# Patient Record
Sex: MALE | Race: WHITE | NOT HISPANIC OR LATINO | Employment: OTHER | URBAN - METROPOLITAN AREA
[De-identification: names, ages, dates, MRNs, and addresses within clinical notes are randomized per-mention and may not be internally consistent; named-entity substitution may affect disease eponyms.]

---

## 2017-01-13 ENCOUNTER — OFFICE VISIT (OUTPATIENT)
Dept: URGENT CARE | Facility: URGENT CARE | Age: 52
End: 2017-01-13
Payer: COMMERCIAL

## 2017-01-13 ENCOUNTER — TELEPHONE (OUTPATIENT)
Dept: NURSING | Facility: CLINIC | Age: 52
End: 2017-01-13

## 2017-01-13 VITALS
DIASTOLIC BLOOD PRESSURE: 66 MMHG | BODY MASS INDEX: 34.84 KG/M2 | OXYGEN SATURATION: 95 % | HEIGHT: 70 IN | TEMPERATURE: 98.3 F | WEIGHT: 243.4 LBS | HEART RATE: 86 BPM | SYSTOLIC BLOOD PRESSURE: 104 MMHG

## 2017-01-13 DIAGNOSIS — J45.901 ASTHMA EXACERBATION: Primary | ICD-10-CM

## 2017-01-13 DIAGNOSIS — J98.01 ACUTE BRONCHOSPASM: ICD-10-CM

## 2017-01-13 DIAGNOSIS — J30.9 ALLERGIC RHINITIS, MILD: ICD-10-CM

## 2017-01-13 PROCEDURE — 94640 AIRWAY INHALATION TREATMENT: CPT | Performed by: PHYSICIAN ASSISTANT

## 2017-01-13 PROCEDURE — 99214 OFFICE O/P EST MOD 30 MIN: CPT | Mod: 25 | Performed by: PHYSICIAN ASSISTANT

## 2017-01-13 RX ORDER — ALBUTEROL SULFATE 0.83 MG/ML
1 SOLUTION RESPIRATORY (INHALATION) ONCE
Qty: 3 ML | Refills: 0
Start: 2017-01-13 | End: 2017-01-13

## 2017-01-13 RX ORDER — PREDNISONE 20 MG/1
40 TABLET ORAL DAILY
Qty: 8 TABLET | Refills: 0 | Status: SHIPPED | OUTPATIENT
Start: 2017-01-13 | End: 2017-01-18

## 2017-01-13 RX ORDER — LORATADINE 10 MG/1
10 TABLET ORAL DAILY
Qty: 30 TABLET | Refills: 0
Start: 2017-01-13 | End: 2018-11-27

## 2017-01-13 RX ORDER — ALBUTEROL SULFATE 0.83 MG/ML
1 SOLUTION RESPIRATORY (INHALATION) EVERY 4 HOURS PRN
Qty: 1 BOX | Refills: 0 | Status: SHIPPED | OUTPATIENT
Start: 2017-01-13 | End: 2017-05-12

## 2017-01-13 RX ORDER — PREDNISONE 20 MG/1
40 TABLET ORAL ONCE
Qty: 2 TABLET | Refills: 0
Start: 2017-01-13 | End: 2017-01-13

## 2017-01-13 NOTE — NURSING NOTE
"Chief Complaint   Patient presents with     Asthma     can not  take a deap breath will cough and lungs start to hurt sense 6 am        Initial /66 mmHg  Pulse 86  Temp(Src) 98.3  F (36.8  C) (Oral)  Ht 5' 10\" (1.778 m)  Wt 243 lb 6.4 oz (110.406 kg)  BMI 34.92 kg/m2  SpO2 95% Estimated body mass index is 34.92 kg/(m^2) as calculated from the following:    Height as of this encounter: 5' 10\" (1.778 m).    Weight as of this encounter: 243 lb 6.4 oz (110.406 kg).  BP completed using cuff size: large    "

## 2017-01-13 NOTE — PROGRESS NOTES
"SUBJECTIVE:   Romelia Crystla is a 51 year old male presenting with a chief complaint of wheezing and asthma exacerbation.  Onset of symptoms was this morning .  Course of illness is same.    Severity moderately severe  Current and Associated symptoms: coughing and chest tightness  Treatment measures tried include albuterol.  Predisposing factors include asthma and allergies.    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     Followed by Rivera Dinero at [facility]     Allergic rhinitis, mild      with cough     Mild persistent asthma      Eczema      HTN (hypertension), benign      Mild persistent asthma         Allergies   Allergen Reactions     Hmg-Coa-R Inhibitors Other (See Comments)     Muscle aches      Lisinopril      cough         Social History   Substance Use Topics     Smoking status: Never Smoker      Smokeless tobacco: Never Used     Alcohol Use: 0.0 oz/week     0 Standard drinks or equivalent per week      Comment: 6 pack a week       ROS:  CONSTITUTIONAL:NEGATIVE for fever, chills, change in weight  INTEGUMENTARY/SKIN: NEGATIVE for worrisome rashes, moles or lesions  ENT/MOUTH: POSITIVE for runny nose, nasal congestion  RESP:POSITIVE for cough-non productive, Hx asthma and wheezing  CV: NEGATIVE for chest pain, palpitations or peripheral edema  GI: NEGATIVE for nausea, abdominal pain, heartburn, or change in bowel habits  MUSCULOSKELETAL: NEGATIVE for significant arthralgias or myalgia  NEURO: NEGATIVE for weakness, dizziness or paresthesias    OBJECTIVE  :/66 mmHg  Pulse 86  Temp(Src) 98.3  F (36.8  C) (Oral)  Ht 5' 10\" (1.778 m)  Wt 243 lb 6.4 oz (110.406 kg)  BMI 34.92 kg/m2  SpO2 95%  GENERAL APPEARANCE: healthy, alert and no distress  HENT: ear canals and TM's normal.  Nose and mouth without ulcers, erythema or lesions  NECK: supple, nontender, no lymphadenopathy  RESP: expiratory wheezes generalized  CV: regular " rates and rhythm, normal S1 S2, no murmur noted  NEURO: Normal strength and tone, sensory exam grossly normal,  normal speech and mentation  SKIN: no suspicious lesions or rashes    Albuterol neb treatment given in clinic    ASSESSMENT/PLAN:    ICD-10-CM    1. Asthma exacerbation J45.901 albuterol (2.5 MG/3ML) 0.083% neb solution     INHALATION/NEBULIZER TREATMENT, INITIAL     predniSONE (DELTASONE) 20 MG tablet     predniSONE (DELTASONE) 20 MG tablet     order for DME     albuterol (2.5 MG/3ML) 0.083% neb solution   2. Allergic rhinitis, mild J30.9 loratadine (CLARITIN) 10 MG tablet   3. Acute bronchospasm J98.01 albuterol (2.5 MG/3ML) 0.083% neb solution     INHALATION/NEBULIZER TREATMENT, INITIAL     predniSONE (DELTASONE) 20 MG tablet     predniSONE (DELTASONE) 20 MG tablet     order for DME     albuterol (2.5 MG/3ML) 0.083% neb solution       Continue albuterol neb at home  Follow up with PCP as needed

## 2017-01-13 NOTE — TELEPHONE ENCOUNTER
Call Type: Triage Call    Presenting Problem: Pharmacy calling w/ question about RX just sent  to them by PA at [facility]. Question can't be answered by note.  Warm  transferred call to [facility].  Triage Note:  Guideline Title: Medication Questions - Adult  Recommended Disposition: Call Provider Immediately  Original Inclination: Wanted to speak with a nurse  Override Disposition:  Intended Action: Call PCP/HCP  Physician Contacted: No  Pharmacy calling to clarify prescription order. ?  YES  Sign(s) or symptom(s) associated with a diagnosed condition or with a new illness  ? NO  Physician Instructions:  Care Advice: Call provider for clarification of the prescribed medication  order.

## 2017-01-14 ENCOUNTER — HOSPITAL ENCOUNTER (EMERGENCY)
Facility: CLINIC | Age: 52
Discharge: HOME | End: 2017-01-15
Attending: EMERGENCY MEDICINE | Admitting: EMERGENCY MEDICINE
Payer: COMMERCIAL

## 2017-01-14 ENCOUNTER — APPOINTMENT (OUTPATIENT)
Dept: GENERAL RADIOLOGY | Facility: CLINIC | Age: 52
End: 2017-01-14
Attending: EMERGENCY MEDICINE
Payer: COMMERCIAL

## 2017-01-14 DIAGNOSIS — J45.901 ASTHMA EXACERBATION: ICD-10-CM

## 2017-01-14 DIAGNOSIS — J10.1 INFLUENZA A: ICD-10-CM

## 2017-01-14 LAB
FLUAV+FLUBV AG SPEC QL: ABNORMAL
FLUAV+FLUBV AG SPEC QL: POSITIVE
SPECIMEN SOURCE: ABNORMAL

## 2017-01-14 PROCEDURE — 71020 XR CHEST 2 VW: CPT

## 2017-01-14 PROCEDURE — 94640 AIRWAY INHALATION TREATMENT: CPT | Mod: 76

## 2017-01-14 PROCEDURE — 87804 INFLUENZA ASSAY W/OPTIC: CPT | Performed by: EMERGENCY MEDICINE

## 2017-01-14 PROCEDURE — 25000132 ZZH RX MED GY IP 250 OP 250 PS 637: Performed by: EMERGENCY MEDICINE

## 2017-01-14 PROCEDURE — 25000125 ZZHC RX 250

## 2017-01-14 PROCEDURE — 99284 EMERGENCY DEPT VISIT MOD MDM: CPT | Mod: 25

## 2017-01-14 PROCEDURE — 25000125 ZZHC RX 250: Performed by: EMERGENCY MEDICINE

## 2017-01-14 PROCEDURE — 94640 AIRWAY INHALATION TREATMENT: CPT

## 2017-01-14 RX ORDER — IPRATROPIUM BROMIDE AND ALBUTEROL SULFATE 2.5; .5 MG/3ML; MG/3ML
SOLUTION RESPIRATORY (INHALATION)
Status: COMPLETED
Start: 2017-01-14 | End: 2017-01-14

## 2017-01-14 RX ORDER — IPRATROPIUM BROMIDE AND ALBUTEROL SULFATE 2.5; .5 MG/3ML; MG/3ML
3 SOLUTION RESPIRATORY (INHALATION)
Status: DISCONTINUED | OUTPATIENT
Start: 2017-01-14 | End: 2017-01-15 | Stop reason: HOSPADM

## 2017-01-14 RX ORDER — ALBUTEROL SULFATE 0.83 MG/ML
SOLUTION RESPIRATORY (INHALATION)
Status: DISCONTINUED
Start: 2017-01-14 | End: 2017-01-15 | Stop reason: HOSPADM

## 2017-01-14 RX ORDER — OSELTAMIVIR PHOSPHATE 75 MG/1
75 CAPSULE ORAL ONCE
Status: COMPLETED | OUTPATIENT
Start: 2017-01-14 | End: 2017-01-14

## 2017-01-14 RX ADMIN — IPRATROPIUM BROMIDE AND ALBUTEROL SULFATE 3 ML: .5; 3 SOLUTION RESPIRATORY (INHALATION) at 23:37

## 2017-01-14 RX ADMIN — IPRATROPIUM BROMIDE AND ALBUTEROL SULFATE 3 ML: .5; 3 SOLUTION RESPIRATORY (INHALATION) at 22:35

## 2017-01-14 RX ADMIN — OSELTAMIVIR PHOSPHATE 75 MG: 75 CAPSULE ORAL at 23:53

## 2017-01-14 NOTE — LETTER
EMERGENCY DEPARTMENT  [address] [address]  [phone]    January 15, 2017    Romelia Crystal  [address] [place] [address]  [phone] (home) [phone] (work)    : 1965      To Whom it may concern:    Romelia Crystal was seen in our Emergency Department today, -15, 2017.  I expect his condition to improve over the next 3 days.  He may return to work when improved.    Sincerely,        Donn Keating

## 2017-01-14 NOTE — ED AVS SNAPSHOT
Emergency Department    [address] [address]    Phone:  [phone]    Fax:  [phone]                                       Romelia Crystal   MRN: [id number]    Department:   Emergency Department   Date of Visit:  1/14/2017           Patient Information     Date Of Birth          1965        Your diagnoses for this visit were:     Influenza A     Asthma exacerbation        You were seen by Donn Keating MD.      Follow-up Information     Follow up with Sandra Guardado PA-C. Schedule an appointment as soon as possible for a visit in 2 days.    Specialty:  Internal Medicine    Why:  As needed    Contact information:    [facility]  [address]  [place] [id number] [phone]          Follow up with  Emergency Department.    Specialty:  EMERGENCY MEDICINE    Why:  As needed, If symptoms worsen    Contact information:    [address] [id number] [phone]        Discharge Instructions       Discharge Instructions  Influenza    You were diagnosed today with influenza or influenza like illness.  Influenza is a respiratory illness caused by influenza A or B viruses.  Influenza causes fever, headache, muscle aches, and fatigue.  These symptoms start one to four days after you have been around a person with this illness.  People with influenza commonly have a dry cough, sore throat, and a runny nose.   Influenza is spread through sneezing and coughing and can live on surfaces for several days.  It is usually contagious for 5 days but in some cases up to 10 days and often affects several family members. If you have a family member who is less than 2 years old, older than 65 years old, pregnant or has a serious medical condition, they should be seen right away by a physician to decide if they should take preventative medications.      Return to the Emergency Department if:    You have trouble breathing.    You develop  pain in your chest.    You have signs of being dehydrated, such as dizziness or unable to urinate at least three times daily.    You feel confused.    You cannot stop vomiting or you cannot drink enough fluids.    In children, you should seek help if the child has any of the above or if child:    Has blue or purplish skin color.    Is so irritable that he or she does not want to be held.    Does not have tears when crying (in infants) or does not urinate at least three times daily.    Does not wake up easily.    Follow-up with your doctor if you are not improving after 5 days.    What can I do to help myself?    Rest.    Fluids -- Drink hydrating solutions such as Gatorade  or Pedialyte  as often as you can. If you are drinking enough, you should pass urine at least every eight hours.    Tylenol  (acetaminophen) and Advil  (ibuprofen) can relieve fever, headache, and muscle aches. Do not give aspirin to children under 18 years old.     Antiviral treatment -- Antiviral medicines do not make the flu symptoms go away immediately.  They have only been shown to make the symptoms go away 12 to 24 hours sooner than they would without treatment.       Antibiotics -- Antibiotics are NOT useful for treating viral illnesses such as influenza. Antibiotics should only be used if there is a bacterial complication of the flu such as bacterial pneumonia, ear infection, or sinusitis.    Because you were diagnosed with a flu like illness you are very contagious.  This means you cannot work, attend school or  for at least 24 hours or until you no longer have a fever.  If you were given a prescription for medicine here today, be sure to read all of the information (including the package insert) that comes with your prescription.  This will include important information about the medicine, its side effects, and any warnings that you need to know about.  The pharmacist who fills the prescription can provide more information and  answer questions you may have about the medicine.  If you have questions or concerns that the pharmacist cannot address, please call or return to the Emergency Department.         Discharge Instructions  Asthma    Asthma is a condition causing narrowing and inflammation of the airways that can make it hard to breathe.  Asthma can also cause cough, wheezing, noisy breathing and tightness in the chest.  Asthma can be brought on or  triggered  by many things, including dust, mold, pollen, cigarette smoke, exercise, stress and infections, like the common cold.     Return to the Emergency Department if:    Your breathing gets worse.    You need to use your inhaler more often than every 4 hours, or can t get relief from your inhaler.    You are very weak, or feel much more ill.    You develop new symptoms, such as chest pain.    You cough up blood.    You are vomiting enough that you can t keep fluids or your medicine down.    What can I do to help myself?    Fill any prescriptions the doctor gave you and take them right away--especially antibiotics. Be sure to finish the whole antibiotic prescription.    You may be given a prescription for an inhaler, which can help loosen tight air passages.  Use this as needed, but not more often than directed. Inhalers work much better when used with a spacer.     You may be given a prescription for a steroid to reduce inflammation. Used long-term, these can have many serious side effects, but for short courses these do not happen. You may notice restlessness or increased appetite.        You may use non-prescription cough or cold medicines. Cough medicines may help, but don t make the cough go away completely.     Avoid smoke, because this can make your symptoms worse. If you smoke, this may be a good time to quit! Consider using nicotine lozenges, gum, or patches to reduce cravings.     If you have a fever, Tylenol  (acetaminophen), Motrin  (ibuprofen), or Advil  (ibuprofen), may  help bring fever down and may help you feel more comfortable. Be sure to read and follow the package directions, and ask your doctor if you have questions.    Be sure to get your flu shot each year.  The pneumonia shot can help prevent pneumonia.  It is important that you follow up with your regular doctor, to be sure that you are improving from this spell (an acute asthma exacerbation), and also to do what you can to keep from having trouble again. Sometimes you need long-term medicines to keep your asthma under control.   If you were given a prescription for medicine here today, be sure to read all of the information (including the package insert) that comes with your prescription.  This will include important information about the medicine, its side effects, and any warnings that you need to know about.  The pharmacist who fills the prescription can provide more information and answer questions you may have about the medicine.  If you have questions or concerns that the pharmacist cannot address, please call or return to the Emergency Department.   Opioid Medication Information    Pain medications are among the most commonly prescribed medicines, so we are including this information for all our patients. If you did not receive pain medication or get a prescription for pain medicine, you can ignore it.     You may have been given a prescription for an opioid (narcotic) pain medicine and/or have received a pain medicine while here in the Emergency Department. These medicines can make you drowsy or impaired. You must not drive, operate dangerous equipment, or engage in any other dangerous activities while taking these medications. If you drive while taking these medications, you could be arrested for DUI, or driving under the influence. Do not drink any alcohol while you are taking these medications.     Opioid pain medications can cause addiction. If you have a history of chemical dependency of any type, you are at  a higher risk of becoming addicted to pain medications.  Only take these prescribed medications to treat your pain when all other options have been tried. Take it for as short a time and as few doses as possible. Store your pain pills in a secure place, as they are frequently stolen and provide a dangerous opportunity for children or visitors in your house to start abusing these powerful medications. We will not replace any lost or stolen medicine.  As soon as your pain is better, you should flush all your remaining medication.     Many prescription pain medications contain Tylenol  (acetaminophen), including Vicodin , Tylenol #3 , Norco , Lortab , and Percocet .  You should not take any extra pills of Tylenol  if you are using these prescription medications or you can get very sick.  Do not ever take more than 3000 mg of acetaminophen in any 24 hour period.    All opioids tend to cause constipation. Drink plenty of water and eat foods that have a lot of fiber, such as fruits, vegetables, prune juice, apple juice and high fiber cereal.  Take a laxative if you don t move your bowels at least every other day. Miralax , Milk of Magnesia, Colace , or Senna  can be used to keep you regular.      Remember that you can always come back to the Emergency Department if you are not able to see your regular doctor in the amount of time listed above, if you get any new symptoms, or if there is anything that worries you.            24 Hour Appointment Hotline       To make an appointment at any [facility], call [phone] ([phone]). If you don't have a family doctor or clinic, we will help you find one. [facility] are conveniently located to serve the needs of you and your family.             Review of your medicines      START taking        Dose / Directions Last dose taken    oseltamivir 75 MG capsule   Commonly known as:  TAMIFLU   Dose:  75 mg   Quantity:  10 capsule        Take 1 capsule (75 mg) by  mouth 2 times daily for 5 days   Refills:  0          Our records show that you are taking the medicines listed below. If these are incorrect, please call your family doctor or clinic.        Dose / Directions Last dose taken    * albuterol 108 (90 BASE) MCG/ACT Inhaler   Commonly known as:  PROAIR HFA/PROVENTIL HFA/VENTOLIN HFA   Dose:  2 puff   Quantity:  1 Inhaler        Inhale 2 puffs into the lungs every 6 hours   Refills:  3        * albuterol (2.5 MG/3ML) 0.083% neb solution   Dose:  1 vial   Quantity:  3 mL        Take 1 vial (2.5 mg) by nebulization once for 1 dose   Refills:  0        * albuterol (2.5 MG/3ML) 0.083% neb solution   Dose:  1 vial   Quantity:  1 Box        Take 1 vial (2.5 mg) by nebulization every 4 hours as needed for shortness of breath / dyspnea or wheezing   Refills:  0        amLODIPine 5 MG tablet   Commonly known as:  NORVASC   Dose:  5 mg   Quantity:  90 tablet        Take 1 tablet (5 mg) by mouth daily   Refills:  3        buPROPion 300 MG 24 hr tablet   Commonly known as:  WELLBUTRIN XL   Dose:  300 mg   Quantity:  90 tablet        Take 1 tablet (300 mg) by mouth every morning   Refills:  1        busPIRone 10 MG tablet   Commonly known as:  BUSPAR   Dose:  10 mg   Quantity:  270 tablet        Take 1 tablet by mouth 2 times daily.   Refills:  1        FLUTICASONE PROPIONATE (NASAL) NA   Dose:  2 spray        Spray 2 sprays in nostril daily as needed   Refills:  0        fluticasone-salmeterol 250-50 MCG/DOSE diskus inhaler   Commonly known as:  ADVAIR   Dose:  1 puff   Quantity:  3 Inhaler        Inhale 1 puff into the lungs 2 times daily   Refills:  3        hydrochlorothiazide 25 MG tablet   Commonly known as:  HYDRODIURIL   Dose:  25 mg   Quantity:  90 tablet        Take 1 tablet (25 mg) by mouth daily   Refills:  3        loratadine 10 MG tablet   Commonly known as:  CLARITIN   Dose:  10 mg   Quantity:  30 tablet        Take 1 tablet (10 mg) by mouth daily   Refills:  0         order for DME   Quantity:  1 Device        Equipment being ordered: nebulizer with tubing   Refills:  0        * predniSONE 20 MG tablet   Commonly known as:  DELTASONE   Dose:  40 mg   Quantity:  8 tablet        Take 2 tablets (40 mg) by mouth daily for 5 days   Refills:  0        rosuvastatin 10 MG tablet   Commonly known as:  CRESTOR   Dose:  10 mg   Quantity:  90 tablet        Take 1 tablet (10 mg) by mouth daily   Refills:  1        venlafaxine 75 MG 24 hr capsule   Commonly known as:  EFFEXOR-XR   Dose:  225 mg        Take 3 capsules (225 mg) by mouth daily   Refills:  0        * Notice:  This list has 4 medication(s) that are the same as other medications prescribed for you. Read the directions carefully, and ask your doctor or other care provider to review them with you.      ASK your doctor about these medications        Dose / Directions Last dose taken    * predniSONE 20 MG tablet   Commonly known as:  DELTASONE   Dose:  40 mg   Quantity:  2 tablet   Ask about: Should I take this medication?        Take 2 tablets (40 mg) by mouth once for 1 dose   Refills:  0        * Notice:  This list has 1 medication(s) that are the same as other medications prescribed for you. Read the directions carefully, and ask your doctor or other care provider to review them with you.            Prescriptions were sent or printed at these locations (1 Prescription)                   Other Prescriptions                Printed at Department/Unit printer (1 of 1)         oseltamivir (TAMIFLU) 75 MG capsule                Procedures and tests performed during your visit     Influenza A/B antigen    Pulse oximetry nursing    XR Chest 2 Views      Orders Needing Specimen Collection     None      Pending Results     No orders found for last 2 day(s).            Pending Culture Results     No orders found for last 2 day(s).       Test Results from your hospital stay           1/14/2017 11:26 PM - Interface, [employer] Results       Component Results     Component Value Ref Range & Units Status    Influenza A/B Agn Specimen Right Nares  Final    Influenza A Positive (A) NEG Final    Influenza B  NEG Final    Negative   Test results must be correlated with clinical data. If necessary, results   should be confirmed by a molecular assay or viral culture.           1/14/2017 11:04 PM - Interface, [facility] Ib      Narrative     XR CHEST 2 VW 1/14/2017 11:01 PM    HISTORY: Cough.    COMPARISON: None.    FINDINGS: No airspace consolidation, pleural effusion or pneumothorax.  Normal heart size.        Impression     IMPRESSION: No acute cardiopulmonary abnormality.    SERGO MOSS MD                Clinical Quality Measure: Blood Pressure Screening     Your blood pressure was checked while you were in the emergency department today. The last reading we obtained was  BP: 138/82 mmHg . Please read the guidelines below about what these numbers mean and what you should do about them.  If your systolic blood pressure (the top number) is less than 120 and your diastolic blood pressure (the bottom number) is less than 80, then your blood pressure is normal. There is nothing more that you need to do about it.  If your systolic blood pressure (the top number) is 120-139 or your diastolic blood pressure (the bottom number) is 80-89, your blood pressure may be higher than it should be. You should have your blood pressure rechecked within a year by a primary care provider.  If your systolic blood pressure (the top number) is 140 or greater or your diastolic blood pressure (the bottom number) is 90 or greater, you may have high blood pressure. High blood pressure is treatable, but if left untreated over time it can put you at risk for heart attack, stroke, or kidney failure. You should have your blood pressure rechecked by a primary care provider within the next 4 weeks.  If your provider in the emergency department today gave you specific instructions to  follow-up with your doctor or provider even sooner than that, you should follow that instruction and not wait for up to 4 weeks for your follow-up visit.        Thank you for choosing [facility]       Thank you for choosing [facility] for your care. Our goal is always to provide you with excellent care. Hearing back from our patients is one way we can continue to improve our services. Please take a few minutes to complete the written survey that you may receive in the mail after you visit with us. Thank you!        [facility] Information     [facility] gives you secure access to your electronic health record. If you see a primary care provider, you can also send messages to your care team and make appointments. If you have questions, please call your primary care clinic.  If you do not have a primary care provider, please call [phone] and they will assist you.        Care EveryWhere ID     This is your Care EveryWhere ID. This could be used by other organizations to access your [facility] medical records  [id number]        After Visit Summary       This is your record. Keep this with you and show to your community pharmacist(s) and doctor(s) at your next visit.

## 2017-01-14 NOTE — ED AVS SNAPSHOT
Emergency Department    [address] [facility] [address]    Phone:  [phone]    Fax:  [phone]                                       Romelia Crystal   MRN: [id number]    Department:   Emergency Department   Date of Visit:  1/14/2017           After Visit Summary Signature Page     I have received my discharge instructions, and my questions have been answered. I have discussed any challenges I see with this plan with the nurse or doctor.    ..........................................................................................................................................  Patient/Patient Representative Signature      ..........................................................................................................................................  Patient Representative Print Name and Relationship to Patient    ..................................................               ................................................  Date                                            Time    ..........................................................................................................................................  Reviewed by Signature/Title    ...................................................              ..............................................  Date                                                            Time

## 2017-01-15 VITALS
WEIGHT: 243 LBS | OXYGEN SATURATION: 95 % | DIASTOLIC BLOOD PRESSURE: 88 MMHG | RESPIRATION RATE: 18 BRPM | TEMPERATURE: 98.3 F | HEIGHT: 70 IN | SYSTOLIC BLOOD PRESSURE: 121 MMHG | BODY MASS INDEX: 34.79 KG/M2

## 2017-01-15 RX ORDER — OSELTAMIVIR PHOSPHATE 75 MG/1
75 CAPSULE ORAL 2 TIMES DAILY
Qty: 10 CAPSULE | Refills: 0 | Status: SHIPPED | OUTPATIENT
Start: 2017-01-15 | End: 2017-01-20

## 2017-01-15 NOTE — DISCHARGE INSTRUCTIONS
Discharge Instructions  Influenza    You were diagnosed today with influenza or influenza like illness.  Influenza is a respiratory illness caused by influenza A or B viruses.  Influenza causes fever, headache, muscle aches, and fatigue.  These symptoms start one to four days after you have been around a person with this illness.  People with influenza commonly have a dry cough, sore throat, and a runny nose.   Influenza is spread through sneezing and coughing and can live on surfaces for several days.  It is usually contagious for 5 days but in some cases up to 10 days and often affects several family members. If you have a family member who is less than 2 years old, older than 65 years old, pregnant or has a serious medical condition, they should be seen right away by a physician to decide if they should take preventative medications.      Return to the Emergency Department if:    You have trouble breathing.    You develop pain in your chest.    You have signs of being dehydrated, such as dizziness or unable to urinate at least three times daily.    You feel confused.    You cannot stop vomiting or you cannot drink enough fluids.    In children, you should seek help if the child has any of the above or if child:    Has blue or purplish skin color.    Is so irritable that he or she does not want to be held.    Does not have tears when crying (in infants) or does not urinate at least three times daily.    Does not wake up easily.    Follow-up with your doctor if you are not improving after 5 days.    What can I do to help myself?    Rest.    Fluids -- Drink hydrating solutions such as Gatorade  or Pedialyte  as often as you can. If you are drinking enough, you should pass urine at least every eight hours.    Tylenol  (acetaminophen) and Advil  (ibuprofen) can relieve fever, headache, and muscle aches. Do not give aspirin to children under 18 years old.     Antiviral treatment -- Antiviral medicines do not make the  flu symptoms go away immediately.  They have only been shown to make the symptoms go away 12 to 24 hours sooner than they would without treatment.       Antibiotics -- Antibiotics are NOT useful for treating viral illnesses such as influenza. Antibiotics should only be used if there is a bacterial complication of the flu such as bacterial pneumonia, ear infection, or sinusitis.    Because you were diagnosed with a flu like illness you are very contagious.  This means you cannot work, attend school or  for at least 24 hours or until you no longer have a fever.  If you were given a prescription for medicine here today, be sure to read all of the information (including the package insert) that comes with your prescription.  This will include important information about the medicine, its side effects, and any warnings that you need to know about.  The pharmacist who fills the prescription can provide more information and answer questions you may have about the medicine.  If you have questions or concerns that the pharmacist cannot address, please call or return to the Emergency Department.         Discharge Instructions  Asthma    Asthma is a condition causing narrowing and inflammation of the airways that can make it hard to breathe.  Asthma can also cause cough, wheezing, noisy breathing and tightness in the chest.  Asthma can be brought on or  triggered  by many things, including dust, mold, pollen, cigarette smoke, exercise, stress and infections, like the common cold.     Return to the Emergency Department if:    Your breathing gets worse.    You need to use your inhaler more often than every 4 hours, or can t get relief from your inhaler.    You are very weak, or feel much more ill.    You develop new symptoms, such as chest pain.    You cough up blood.    You are vomiting enough that you can t keep fluids or your medicine down.    What can I do to help myself?    Fill any prescriptions the doctor gave you  and take them right away--especially antibiotics. Be sure to finish the whole antibiotic prescription.    You may be given a prescription for an inhaler, which can help loosen tight air passages.  Use this as needed, but not more often than directed. Inhalers work much better when used with a spacer.     You may be given a prescription for a steroid to reduce inflammation. Used long-term, these can have many serious side effects, but for short courses these do not happen. You may notice restlessness or increased appetite.        You may use non-prescription cough or cold medicines. Cough medicines may help, but don t make the cough go away completely.     Avoid smoke, because this can make your symptoms worse. If you smoke, this may be a good time to quit! Consider using nicotine lozenges, gum, or patches to reduce cravings.     If you have a fever, Tylenol  (acetaminophen), Motrin  (ibuprofen), or Advil  (ibuprofen), may help bring fever down and may help you feel more comfortable. Be sure to read and follow the package directions, and ask your doctor if you have questions.    Be sure to get your flu shot each year.  The pneumonia shot can help prevent pneumonia.  It is important that you follow up with your regular doctor, to be sure that you are improving from this spell (an acute asthma exacerbation), and also to do what you can to keep from having trouble again. Sometimes you need long-term medicines to keep your asthma under control.   If you were given a prescription for medicine here today, be sure to read all of the information (including the package insert) that comes with your prescription.  This will include important information about the medicine, its side effects, and any warnings that you need to know about.  The pharmacist who fills the prescription can provide more information and answer questions you may have about the medicine.  If you have questions or concerns that the pharmacist cannot  address, please call or return to the Emergency Department.   Opioid Medication Information    Pain medications are among the most commonly prescribed medicines, so we are including this information for all our patients. If you did not receive pain medication or get a prescription for pain medicine, you can ignore it.     You may have been given a prescription for an opioid (narcotic) pain medicine and/or have received a pain medicine while here in the Emergency Department. These medicines can make you drowsy or impaired. You must not drive, operate dangerous equipment, or engage in any other dangerous activities while taking these medications. If you drive while taking these medications, you could be arrested for DUI, or driving under the influence. Do not drink any alcohol while you are taking these medications.     Opioid pain medications can cause addiction. If you have a history of chemical dependency of any type, you are at a higher risk of becoming addicted to pain medications.  Only take these prescribed medications to treat your pain when all other options have been tried. Take it for as short a time and as few doses as possible. Store your pain pills in a secure place, as they are frequently stolen and provide a dangerous opportunity for children or visitors in your house to start abusing these powerful medications. We will not replace any lost or stolen medicine.  As soon as your pain is better, you should flush all your remaining medication.     Many prescription pain medications contain Tylenol  (acetaminophen), including Vicodin , Tylenol #3 , Norco , Lortab , and Percocet .  You should not take any extra pills of Tylenol  if you are using these prescription medications or you can get very sick.  Do not ever take more than 3000 mg of acetaminophen in any 24 hour period.    All opioids tend to cause constipation. Drink plenty of water and eat foods that have a lot of fiber, such as fruits, vegetables,  prune juice, apple juice and high fiber cereal.  Take a laxative if you don t move your bowels at least every other day. Miralax , Milk of Magnesia, Colace , or Senna  can be used to keep you regular.      Remember that you can always come back to the Emergency Department if you are not able to see your regular doctor in the amount of time listed above, if you get any new symptoms, or if there is anything that worries you.

## 2017-01-15 NOTE — ED PROVIDER NOTES
"  History     Chief Complaint:  SOB    HPI   Romelia Crystal is a 51 year old male who presents with a history of asthma, who was seen early yesterday in urgent care for asthma symptoms, being discharged on oral prednisone. He presents to the ED with worsening SOB. He  Has felt slightly feverish today. He got his Influenza shot a week ago.   He reports having been hospitalized decades ago in the ICU for his asthma, never being intubated, he denies smoking. He reports close contacts with upper respiratory infection symptoms.  Current symptoms feel primarily like his asthma.  No history of DVT/PE, no pain.    PE and DVT Risk Factors:  Personal hx of PE/DVT:  Negative  Family hx of PE/DVT:  Negative  Recent travel:    Negative  Recent surgery:   Negative  Other immobilizations:  Negative  Hx cancer:    Negative    Allergies:  The patient has no known drug allergies.    Medications:    Albuterol neb solution  Prednisone   Loratadine  Fluticasone propionate, nasal  Ventolin HFA  HCTz  Advair Diskus inh  Rosuvastatin  Bupropion  Amlodipine  Venlafaxine  buspirone     Past Medical History:    Dyslipidemia  Recurrent mild major depression  Allergic rhinitis  Moderate persistent asthma  Eczema  HTN  Hives  BPPV    Past Surgical History:    Tonsillectomy  [place] teeth extraction    Family History:    Dyslipidemia  Lymphoma    Social History:  Relationship status:   Tobacco use: negative   Alcohol use: 6 pack of beers a week  The patient presents with his wife.   Computer job.    Review of Systems   All other systems reviewed and are negative.    Physical Exam     Patient Vitals for the past 24 hours:   BP Temp Temp src Heart Rate Resp SpO2 Height Weight   01/14/17 2345 - - - - - 100 % - -   01/14/17 2332 - - - - - 92 % - -   01/14/17 2303 - - - 104 - 97 % - -   01/14/17 2225 138/82 mmHg 98.3  F (36.8  C) Temporal 105 22 95 % 1.778 m (5' 10\") 110.224 kg (243 lb)       Physical Exam  General: nontoxic appearing male " sitting upright getting a neb, wife at bedside  HENT: mucous membranes moist  CV: mildly tachycardic rate, regular rhythm, no lower extremity edema  Resp: mild diffuse expiratory wheezing, normal I:E ratio, no stridor, occasional dry cough observed, no accessory muscle use   GI: abdomen soft and nontender, no guarding  MSK: no bony tenderness  Skin: appropriately warm and dry  Neuro: alert, clear speech, oriented, no meningismus  Psych: normal mood and affect      Emergency Department Course     Imaging:  Radiology findings were communicated with the patient who voiced understanding of the findings.  Chest XR, PA and LAT, per radiology:   No acute cardiopulmonary abnormality.     Laboratory:  Laboratory findings were communicated with the patient who voiced understanding of the findings.  Influenza A/B antigen: A: positive  B:Negative    Interventions:  2337: Duoneb sol 3 ml, nebulized  2353: Tamiflu 75 mg, PO     Emergency Department Course:  Nursing notes and vitals reviewed.  I performed an exam of the patient as documented above.   The patient was placed on continuous pulse oximetry and cardiac monitoring.  A peripheral IV was established.    The patient provided a urine sample here in the emergency department. This was sent for laboratory testing, findings above.  The patient was sent for a CXR while in the emergency department, findings above.   A nasal swab sample was obtained for Influenza testing, results above.    At 0050 the patient was rechecked and was updated on the results of his laboratory and imaging studies.  Lungs re-auscultated and no wheezing heard, with good air movement throughout.  He requests discharge.  P low 100s after nebs, SaO2 mid 90s on RA, normal WOB.    I discussed the treatment plan with the patient. They expressed understanding of this plan and consented to discharge. They will be discharged home with instructions for care and follow up. In addition, the patient will return to the  emergency department if their symptoms persist, worsen, if new symptoms arise or if there is any concern.  All questions were answered.    I personally reviewed the laboratory and imaging results with the patient and answered all related questions prior to discharge.    Impression & Plan      Medical Decision Making:  I think his symptoms are due to an asthma exacerbation complicated by influenza infection. He is not in distress and felt improved after multiple medications given here. I offered hospitalization which he politely declined. In the absence of respiratory distress, hypoxia, pulmonary infiltrate or other more worrisome signs or symptoms, I think continued outpatient management is reasonable. Given his underlying lung disease, Tamiflu was prescribed. Return precautions were reviewed in detail. He confirmed that he has an adequate supply of bronchodilators at home. He will follow-up this coming week through PCP if not starting to gradually improve.    Diagnosis:    ICD-10-CM    1. Influenza A J10.1    2. Asthma exacerbation J45.901        Disposition:   Discharge home    Discharge Medications:  New Prescriptions    OSELTAMIVIR (TAMIFLU) 75 MG CAPSULE    Take 1 capsule (75 mg) by mouth 2 times daily for 5 days       Scribe Disclosure:  I, Zarina Francisco, am serving as a scribe at 10:41 PM on 1/14/2017 to document services personally performed by Donn Keating MD, based on my observations and the provider's statements to me.          Donn Keating MD  01/15/17 0414

## 2017-01-26 ENCOUNTER — MYC MEDICAL ADVICE (OUTPATIENT)
Dept: FAMILY MEDICINE | Facility: CLINIC | Age: 52
End: 2017-01-26

## 2017-01-26 DIAGNOSIS — A09 TRAVELER'S DIARRHEA: Primary | ICD-10-CM

## 2017-01-26 RX ORDER — CIPROFLOXACIN 500 MG/1
500 TABLET, FILM COATED ORAL 2 TIMES DAILY
Qty: 6 TABLET | Refills: 0 | Status: SHIPPED | OUTPATIENT
Start: 2017-01-26 | End: 2017-05-12

## 2017-01-26 NOTE — TELEPHONE ENCOUNTER
Please see My Chart message below and advise as appropriate.    Nuria Menchaca RN  [employer]

## 2017-05-05 DIAGNOSIS — E78.5 HYPERLIPIDEMIA WITH TARGET LDL LESS THAN 130: ICD-10-CM

## 2017-05-05 RX ORDER — ROSUVASTATIN CALCIUM 5 MG/1
TABLET, COATED ORAL
Qty: 90 TABLET | Refills: 0 | Status: SHIPPED | OUTPATIENT
Start: 2017-05-05 | End: 2017-07-25

## 2017-05-05 NOTE — TELEPHONE ENCOUNTER
Left message for pt to schedule.  Pt does have MTM scheduled but needs PCP as well  Sending in 1x refill, due for fu  Marce Jewell RN      Cholesterol   Date Value Ref Range Status   04/11/2016 215 (H) <200 mg/dL Final     Comment:     Desirable:       <200 mg/dl     HDL Cholesterol   Date Value Ref Range Status   04/11/2016 44 >39 mg/dL Final     LDL Cholesterol Calculated   Date Value Ref Range Status   04/11/2016 131 (H) <100 mg/dL Final     Comment:     Above desirable:  100-129 mg/dl   Borderline High:  130-159 mg/dL   High:             160-189 mg/dL   Very high:       >189 mg/dl       Triglycerides   Date Value Ref Range Status   04/11/2016 202 (H) <150 mg/dL Final     Comment:     Borderline high:  150-199 mg/dl   High:             200-499 mg/dl   Very high:       >499 mg/dl       Cholesterol/HDL Ratio   Date Value Ref Range Status   02/16/2015 5.0 0.0 - 5.0 Final     ALT   Date Value Ref Range Status   04/11/2016 37 0 - 70 U/L Final

## 2017-05-12 ENCOUNTER — ALLIED HEALTH/NURSE VISIT (OUTPATIENT)
Dept: PHARMACY | Facility: CLINIC | Age: 52
End: 2017-05-12
Payer: COMMERCIAL

## 2017-05-12 DIAGNOSIS — J45.40 MODERATE PERSISTENT ASTHMA, UNCOMPLICATED: Primary | ICD-10-CM

## 2017-05-12 DIAGNOSIS — F33.0 MILD RECURRENT MAJOR DEPRESSION (H): ICD-10-CM

## 2017-05-12 DIAGNOSIS — J30.9 ALLERGIC RHINITIS, MILD: ICD-10-CM

## 2017-05-12 DIAGNOSIS — I10 ESSENTIAL HYPERTENSION, BENIGN: ICD-10-CM

## 2017-05-12 DIAGNOSIS — E78.5 HYPERLIPIDEMIA WITH TARGET LDL LESS THAN 130: ICD-10-CM

## 2017-05-12 PROCEDURE — 99605 MTMS BY PHARM NP 15 MIN: CPT | Mod: U4 | Performed by: PHARMACIST

## 2017-05-12 PROCEDURE — 99607 MTMS BY PHARM ADDL 15 MIN: CPT | Mod: U4 | Performed by: PHARMACIST

## 2017-05-12 NOTE — MR AVS SNAPSHOT
After Visit Summary   5/12/2017    Romelia Crystal    MRN: [id number]           Patient Information     Date Of Birth          1965        Visit Information        Provider Department      5/12/2017 2:00 PM [facility]        Today's Diagnoses     Moderate persistent asthma, uncomplicated    -  1    Mild recurrent major depression (H)        Essential hypertension, benign        Hyperlipidemia with target LDL less than 130        Allergic rhinitis, mild          Care Instructions    Sent via Compiere        Follow-ups after your visit        Who to contact     If you have questions or need follow up information about today's clinic visit or your schedule please contact [facility] directly at [phone].  Normal or non-critical lab and imaging results will be communicated to you by Social Reality[employer], letter or phone within 4 business days after the clinic has received the results. If you do not hear from us within 7 days, please contact the clinic through [email or web address] or phone. If you have a critical or abnormal lab result, we will notify you by phone as soon as possible.  Submit refill requests through Compiere or call your pharmacy and they will forward the refill request to us. Please allow 3 business days for your refill to be completed.          Additional Information About Your Visit        MyChart Information     Compiere gives you secure access to your electronic health record. If you see a primary care provider, you can also send messages to your care team and make appointments. If you have questions, please call your primary care clinic.  If you do not have a primary care provider, please call [phone] and they will assist you.        Care EveryWhere ID     This is your Care EveryWhere ID. This could be used by other organizations to access your [place] [facility] medical records  [id number]         Blood Pressure from Last 3 Encounters:   01/15/17  121/88   01/13/17 104/66   04/11/16 110/82    Weight from Last 3 Encounters:   01/14/17 243 lb (110.2 kg)   01/13/17 243 lb 6.4 oz (110.4 kg)   04/11/16 247 lb (112 kg)              Today, you had the following     No orders found for display       Primary Care Provider Office Phone # Fax #    Sandra Guardado PA-C [phone]       [facility] [address] [facility] [address]  [facility] [address]        Thank you!     Thank you for choosing [facility]  for your care. Our goal is always to provide you with excellent care. Hearing back from our patients is one way we can continue to improve our services. Please take a few minutes to complete the written survey that you may receive in the mail after your visit with us. Thank you!             Your Updated Medication List - Protect others around you: Learn how to safely use, store and throw away your medicines at www.disposemymeds.org.          This list is accurate as of: 5/12/17  2:45 PM.  Always use your most recent med list.                   Brand Name Dispense Instructions for use    albuterol 108 (90 BASE) MCG/ACT Inhaler    PROAIR HFA/PROVENTIL HFA/VENTOLIN HFA    1 Inhaler    Inhale 2 puffs into the lungs every 6 hours       amLODIPine 5 MG tablet    NORVASC    90 tablet    Take 1 tablet (5 mg) by mouth daily       buPROPion 300 MG 24 hr tablet    WELLBUTRIN XL    90 tablet    Take 1 tablet (300 mg) by mouth every morning       busPIRone 10 MG tablet    BUSPAR    270 tablet    Take 1 tablet by mouth 2 times daily.       FLUTICASONE PROPIONATE (NASAL) NA      Spray 2 sprays in nostril daily as needed       fluticasone-salmeterol 250-50 MCG/DOSE diskus inhaler    ADVAIR    3 Inhaler    Inhale 1 puff into the lungs 2 times daily       hydrochlorothiazide 25 MG tablet    HYDRODIURIL    90 tablet    Take 1 tablet (25 mg) by mouth daily       loratadine 10 MG tablet    CLARITIN    30 tablet    Take 1 tablet (10 mg) by mouth daily        order for DME     1 Device    Equipment being ordered: nebulizer with tubing       rosuvastatin 5 MG tablet    CRESTOR    90 tablet    TAKE ONE TABLET BY MOUTH ONE TIME DAILY       venlafaxine 75 MG 24 hr capsule    EFFEXOR-XR     Take 3 capsules (225 mg) by mouth daily

## 2017-05-12 NOTE — PROGRESS NOTES
SUBJECTIVE/OBJECTIVE:                                                    Romelia Crystal is a 52 year old male called for a follow-up visit for Medication Therapy Management.  He was referred to me from his [employer] insurance plan. Current PCP is Sandra Guardado PA-C. This serves as an initial visit for 2017.    Chief Complaint: Follow up from our visit on 12/9/16.  Comprehensive Medication Review.   Personal Healthcare Goals: keep asthma under control  Tobacco: No tobacco use   Alcohol: Less than 1 beverages / week    Medication Adherence: no issues reported    Asthma:  Current asthma medications: Albuterol MDI PRN (Pt reports using albuterol rarely) and Fluticasone+Salmeterol (Advair) twice daily. Pt rinses their mouth after using steroid inhaler. Asthma triggers include: dust mites, mold, strong odors and fumes and cold air.  Did have Influenza A in January 2017 and had a bad asthma exacerbation and then had lots of dust exposure while on a work trip to [place].  Pt completed ACT over the phone while looking at ACT questionnaire.  Pt reports the following symptoms: none.  AAP on file: YES  ACT Total Scores 9/11/2014 4/11/2016 5/12/2017   ACT TOTAL SCORE 22 - -   ASTHMA ER VISITS 0 = None - -   ASTHMA HOSPITALIZATIONS 0 = None - -   ACT TOTAL SCORE (Goal Greater than or Equal to 20) - 22 22   In the past 12 months, how many times did you visit the emergency room for your asthma without being admitted to the hospital? - 0 1   In the past 12 months, how many times were you hospitalized overnight because of your asthma? - 0 0     Depression:  Current medications include: Venlafaxine  mg once daily, Bupropion  mg once daily and Buspirone 10 mg ONCE daily (prescribed wants him to have the option of twice daily). Sees Dr. Rivera Dinero for mental health. Current depression symptoms are controlled. PHQ-9 assessed today.  PHQ-9 SCORE 8/5/2014 4/11/2016 5/12/2017   Total Score 7 - -   Total Score - 5 0      Hypertension: Current medications include HCTZ 25 mg daily and amlodipine 5 mg daily.  Patient does self-monitor BP. Home BP monitoring in range of 120's systolic over 70-80's diastolic.  Patient reports no current medication side effects.    Hyperlipidemia: Current therapy includes rosuvastatin 5 mg once daily (dose reduced from 10 mg daily).  Pt reports no significant myalgias or other side effects.   The 10-year ASCVD risk score ([place]jarad RODRIGUEZ Jr, et al., 2013) is: 6.6%    Values used to calculate the score:      Age: 52 years      Sex: Male      Is Non- : No      Diabetic: No      Tobacco smoker: No      Systolic Blood Pressure: 121 mmHg      Is BP treated: Yes      HDL Cholesterol: 44 mg/dL      Total Cholesterol: 215 mg/dL    Allergic rhinitis: Current medications include fluticasone nasal spray 1 spray(s) daily PRN and loratadine 10 mg daily PRN. Primary symptoms are nasal congestion. Pt feels that current therapy is effective.     Current labs include:  BP Readings from Last 3 Encounters:   01/15/17 121/88   01/13/17 104/66   04/11/16 110/82     Today's Vitals: There were no vitals taken for this visit. - telephone encounter  Lab Results   Component Value Date    CHOL 215 04/11/2016     Lab Results   Component Value Date    TRIG 202 04/11/2016     Lab Results   Component Value Date    HDL 44 04/11/2016     Lab Results   Component Value Date     04/11/2016       Liver Function Studies -   Recent Labs   Lab Test  04/11/16   0828   PROTTOTAL  6.3*   ALBUMIN  3.8   BILITOTAL  0.4   ALKPHOS  67   AST  19   ALT  37     Last Basic Metabolic Panel:  Lab Results   Component Value Date     04/11/2016      Lab Results   Component Value Date    POTASSIUM 3.9 04/11/2016     Lab Results   Component Value Date    CHLORIDE 103 04/11/2016     Lab Results   Component Value Date    BUN 10 04/11/2016     Lab Results   Component Value Date    CR 1.01 04/11/2016     GFR Estimate   Date Value Ref  Range Status   04/11/2016 77 >60 mL/min/1.7m2 Final     Comment:     Non  GFR Calc   09/11/2014 82 >60 mL/min/1.7m2 Final     Comment:     Non  GFR Calc   10/29/2010 87 >60 mL/min/1.7m2 Final     Most Recent Immunizations   Administered Date(s) Administered     Influenza (IIV3) 01/07/2017     Influenza Vaccine, 3 YRS +, IM (QUADRIVALENT W/PRESERVATIVES) 12/04/2014     TDAP Vaccine (Adacel) 06/12/2012     ASSESSMENT:                                                    Current medications were reviewed today as discussed above.      Medication Adherence: no issues identified    Asthma: Stable. Up to date and at goal of ACT > or equal to 20.     Depression:  Stable per patient.    Hypertension: Stable. Patient is meeting BP goal of < 140/90mmHg based upon home readings and last clinic check.     Hyperlipidemia: Stable. Pt is on moderate intensity statin which is indicated based on 2013 ACC/AHA guidelines for lipid management.  Reason for dose change unknown, but patient is likely due for lipid recheck in near future.     Allergic rhinitis: Stable.     PLAN:                                                      1. Continue current medications.     I spent 30 minutes with this patient today. A copy of the visit note was provided to the patient's primary care provider.     Will follow up in 1 year or sooner if needed.    The patient was sent via [employer] a summary of these recommendations as an after visit summary.    Matthew Porter, Marian  Medication Therapy Management Provider  Pager (voicemail): [phone]

## 2017-05-13 ASSESSMENT — PATIENT HEALTH QUESTIONNAIRE - PHQ9: SUM OF ALL RESPONSES TO PHQ QUESTIONS 1-9: 0

## 2017-05-13 ASSESSMENT — ASTHMA QUESTIONNAIRES: ACT_TOTALSCORE: 22

## 2017-07-25 ENCOUNTER — MYC MEDICAL ADVICE (OUTPATIENT)
Dept: FAMILY MEDICINE | Facility: CLINIC | Age: 52
End: 2017-07-25

## 2017-07-25 ENCOUNTER — OFFICE VISIT (OUTPATIENT)
Dept: FAMILY MEDICINE | Facility: CLINIC | Age: 52
End: 2017-07-25
Payer: COMMERCIAL

## 2017-07-25 VITALS
HEART RATE: 89 BPM | OXYGEN SATURATION: 97 % | WEIGHT: 249.3 LBS | SYSTOLIC BLOOD PRESSURE: 128 MMHG | HEIGHT: 70 IN | TEMPERATURE: 98.6 F | BODY MASS INDEX: 35.69 KG/M2 | DIASTOLIC BLOOD PRESSURE: 83 MMHG

## 2017-07-25 DIAGNOSIS — R97.20 ELEVATED PROSTATE SPECIFIC ANTIGEN (PSA): ICD-10-CM

## 2017-07-25 DIAGNOSIS — M79.10 MYALGIA DUE TO STATIN: ICD-10-CM

## 2017-07-25 DIAGNOSIS — J45.30 MILD PERSISTENT ASTHMA, UNCOMPLICATED: ICD-10-CM

## 2017-07-25 DIAGNOSIS — I10 ESSENTIAL HYPERTENSION, BENIGN: Primary | ICD-10-CM

## 2017-07-25 DIAGNOSIS — Z11.59 NEED FOR HEPATITIS C SCREENING TEST: ICD-10-CM

## 2017-07-25 DIAGNOSIS — E78.5 HYPERLIPIDEMIA WITH TARGET LDL LESS THAN 130: ICD-10-CM

## 2017-07-25 DIAGNOSIS — R42 VERTIGO: ICD-10-CM

## 2017-07-25 DIAGNOSIS — T46.6X5A MYALGIA DUE TO STATIN: ICD-10-CM

## 2017-07-25 PROCEDURE — 99214 OFFICE O/P EST MOD 30 MIN: CPT | Performed by: PHYSICIAN ASSISTANT

## 2017-07-25 RX ORDER — AMLODIPINE BESYLATE 5 MG/1
5 TABLET ORAL DAILY
Qty: 90 TABLET | Refills: 3 | Status: SHIPPED | OUTPATIENT
Start: 2017-07-25 | End: 2018-09-07

## 2017-07-25 RX ORDER — ALBUTEROL SULFATE 90 UG/1
2 AEROSOL, METERED RESPIRATORY (INHALATION) EVERY 6 HOURS
Qty: 1 INHALER | Refills: 3 | Status: CANCELLED | OUTPATIENT
Start: 2017-07-25

## 2017-07-25 RX ORDER — ALBUTEROL SULFATE 90 UG/1
2 AEROSOL, METERED RESPIRATORY (INHALATION) EVERY 6 HOURS
Qty: 1 INHALER | Refills: 6 | Status: SHIPPED | OUTPATIENT
Start: 2017-07-25 | End: 2018-08-07

## 2017-07-25 RX ORDER — HYDROCHLOROTHIAZIDE 25 MG/1
25 TABLET ORAL DAILY
Qty: 90 TABLET | Refills: 3 | Status: CANCELLED | OUTPATIENT
Start: 2017-07-25

## 2017-07-25 RX ORDER — ROSUVASTATIN CALCIUM 5 MG/1
5 TABLET, COATED ORAL DAILY
Qty: 90 TABLET | Refills: 0 | Status: CANCELLED | OUTPATIENT
Start: 2017-07-25

## 2017-07-25 NOTE — NURSING NOTE
"Chief Complaint   Patient presents with     Recheck Medication       Initial /83 (BP Location: Left arm, Patient Position: Chair, Cuff Size: Adult Large)  Pulse 89  Temp 98.6  F (37  C) (Oral)  Ht 5' 10\" (1.778 m)  Wt 249 lb 4.8 oz (113.1 kg)  SpO2 97%  BMI 35.77 kg/m2 Estimated body mass index is 35.77 kg/(m^2) as calculated from the following:    Height as of this encounter: 5' 10\" (1.778 m).    Weight as of this encounter: 249 lb 4.8 oz (113.1 kg).  Medication Reconciliation: complete   Neha Weinstein MA  "

## 2017-07-25 NOTE — MR AVS SNAPSHOT
After Visit Summary   7/25/2017    Romelia Crystal    MRN: [id number]           Patient Information     Date Of Birth          1965        Visit Information        Provider Department      7/25/2017 1:00 PM Sandra Guardado PA-C [facility]        Today's Diagnoses     Essential hypertension, benign    -  1    Hyperlipidemia with target LDL less than 130        Myalgia due to statin        Vertigo        Mild persistent asthma, uncomplicated        Elevated prostate specific antigen (PSA)        Need for hepatitis C screening test          Care Instructions    Make follow up appt with Dr. López for the elevated PSA  Okay to stay off of hydrochlorathiazide and closely monitor the blood pressure  Let me know if BP> 140/90    We may consider having you try Co Q 10 and vit D with crestor just every other day     Make appt for fasting labs asap          Follow-ups after your visit        Future tests that were ordered for you today     Open Future Orders        Priority Expected Expires Ordered    Hepatitis C Screen Reflex to HCV RNA Quant and Genotype Routine 7/26/2017 9/25/2017 7/25/2017    Vitamin D Deficiency Routine 7/26/2017 9/25/2017 7/25/2017    CK total Routine 7/26/2017 9/25/2017 7/25/2017    Comprehensive metabolic panel Routine 7/26/2017 9/25/2017 7/25/2017    PSA, screen Routine 7/26/2017 9/25/2017 7/25/2017    Lipid Profile Routine 7/26/2017 9/25/2017 7/25/2017            Who to contact     If you have questions or need follow up information about today's clinic visit or your schedule please contact [facility] directly at [phone].  Normal or non-critical lab and imaging results will be communicated to you by MyChart, letter or phone within 4 business days after the clinic has received the results. If you do not hear from us within 7 days, please contact the clinic through MyChart or phone. If you have a critical or abnormal lab result, we will notify you by phone  "as soon as possible.  Submit refill requests through Yardsale or call your pharmacy and they will forward the refill request to us. Please allow 3 business days for your refill to be completed.          Additional Information About Your Visit        Yardsale Information     Yardsale gives you secure access to your electronic health record. If you see a primary care provider, you can also send messages to your care team and make appointments. If you have questions, please call your primary care clinic.  If you do not have a primary care provider, please call [phone] and they will assist you.        Care EveryWhere ID     This is your Care EveryWhere ID. This could be used by other organizations to access your [place] medical records  [id number]        Your Vitals Were     Pulse Temperature Height Pulse Oximetry BMI (Body Mass Index)       89 98.6  F (37  C) (Oral) 5' 10\" (1.778 m) 97% 35.77 kg/m2        Blood Pressure from Last 3 Encounters:   07/25/17 128/83   01/15/17 121/88   01/13/17 104/66    Weight from Last 3 Encounters:   07/25/17 249 lb 4.8 oz (113.1 kg)   01/14/17 243 lb (110.2 kg)   01/13/17 243 lb 6.4 oz (110.4 kg)                 Today's Medication Changes          These changes are accurate as of: 7/25/17  1:30 PM.  If you have any questions, ask your nurse or doctor.               These medicines have changed or have updated prescriptions.        Dose/Directions    loratadine 10 MG tablet   Commonly known as:  CLARITIN   This may have changed:    - when to take this  - reasons to take this   Used for:  Allergic rhinitis, mild        Dose:  10 mg   Take 1 tablet (10 mg) by mouth daily   Quantity:  30 tablet   Refills:  0         Stop taking these medicines if you haven't already. Please contact your care team if you have questions.     busPIRone 10 MG tablet   Commonly known as:  BUSPAR   Stopped by:  Sandra Guardado PA-C           hydrochlorothiazide 25 MG tablet   Commonly known as:  " HYDRODIURIL   Stopped by:  Sandra Guardado PA-C           rosuvastatin 5 MG tablet   Commonly known as:  CRESTOR   Stopped by:  Sandra Guardado PA-C                Where to get your medicines      These medications [id number] - [place], [place] - [address]  [address], [facility] [address]     Phone:  [phone]     albuterol 108 (90 BASE) MCG/ACT Inhaler    amLODIPine 5 MG tablet    fluticasone-salmeterol 250-50 MCG/DOSE diskus inhaler                Primary Care Provider Office Phone # Fax #    Sandra Guardado PA-C [phone]       [facility] [address]  [facility] [address]        Equal Access to Services     SANDEEP GRANADOS : Hadii aad ku hadasho Soomaali, waaxda luqadaha, qaybta kaalmada adeegyada, herber catnuin hayaacheyenne thompson . So [facility] [phone].    ATENCIÓN: Si habla español, tiene a salgado disposición servicios gratuitos de asistencia lingüística. [facility] [phone].    We comply with applicable federal civil rights laws and [place] laws. We do not discriminate on the basis of race, color, national origin, age, disability sex, sexual orientation or gender identity.            Thank you!     Thank you for choosing [facility]  for your care. Our goal is always to provide you with excellent care. Hearing back from our patients is one way we can continue to improve our services. Please take a few minutes to complete the written survey that you may receive in the mail after your visit with us. Thank you!             Your Updated Medication List - Protect others around you: Learn how to safely use, store and throw away your medicines at www.disposemymeds.org.          This list is accurate as of: 7/25/17  1:30 PM.  Always use your most recent med list.                   Brand Name Dispense Instructions for use Diagnosis    albuterol 108 (90 BASE) MCG/ACT Inhaler    PROAIR HFA/PROVENTIL HFA/VENTOLIN HFA     1 Inhaler    Inhale 2 puffs into the lungs every 6 hours    Mild persistent asthma, uncomplicated       amLODIPine 5 MG tablet    NORVASC    90 tablet    Take 1 tablet (5 mg) by mouth daily    Essential hypertension, benign       buPROPion 300 MG 24 hr tablet    WELLBUTRIN XL    90 tablet    Take 1 tablet (300 mg) by mouth every morning    Mild recurrent major depression (H)       fluticasone-salmeterol 250-50 MCG/DOSE diskus inhaler    ADVAIR    3 Inhaler    Inhale 1 puff into the lungs 2 times daily    Mild persistent asthma, uncomplicated       loratadine 10 MG tablet    CLARITIN    30 tablet    Take 1 tablet (10 mg) by mouth daily    Allergic rhinitis, mild       venlafaxine 75 MG 24 hr capsule    EFFEXOR-XR     Take 150 mg by mouth daily

## 2017-07-25 NOTE — PATIENT INSTRUCTIONS
Make follow up appt with Dr. López for the elevated PSA  Okay to stay off of hydrochlorathiazide and closely monitor the blood pressure  Let me know if BP> 140/90    We may consider having you try Co Q 10 and vit D with crestor just every other day     Make appt for fasting labs asap

## 2017-07-26 DIAGNOSIS — R97.20 ELEVATED PROSTATE SPECIFIC ANTIGEN (PSA): ICD-10-CM

## 2017-07-26 DIAGNOSIS — I10 ESSENTIAL HYPERTENSION, BENIGN: ICD-10-CM

## 2017-07-26 DIAGNOSIS — E78.5 HYPERLIPIDEMIA WITH TARGET LDL LESS THAN 130: ICD-10-CM

## 2017-07-26 DIAGNOSIS — M79.10 MYALGIA DUE TO STATIN: ICD-10-CM

## 2017-07-26 DIAGNOSIS — T46.6X5A MYALGIA DUE TO STATIN: ICD-10-CM

## 2017-07-26 DIAGNOSIS — Z11.59 NEED FOR HEPATITIS C SCREENING TEST: ICD-10-CM

## 2017-07-26 LAB
ALBUMIN SERPL-MCNC: 3.7 G/DL (ref 3.4–5)
ALP SERPL-CCNC: 65 U/L (ref 40–150)
ALT SERPL W P-5'-P-CCNC: 31 U/L (ref 0–70)
ANION GAP SERPL CALCULATED.3IONS-SCNC: 3 MMOL/L (ref 3–14)
AST SERPL W P-5'-P-CCNC: 20 U/L (ref 0–45)
BILIRUB SERPL-MCNC: 0.5 MG/DL (ref 0.2–1.3)
BUN SERPL-MCNC: 9 MG/DL (ref 7–30)
CALCIUM SERPL-MCNC: 8.9 MG/DL (ref 8.5–10.1)
CHLORIDE SERPL-SCNC: 107 MMOL/L (ref 94–109)
CHOLEST SERPL-MCNC: 317 MG/DL
CK SERPL-CCNC: 68 U/L (ref 30–300)
CO2 SERPL-SCNC: 32 MMOL/L (ref 20–32)
CREAT SERPL-MCNC: 1 MG/DL (ref 0.66–1.25)
GFR SERPL CREATININE-BSD FRML MDRD: 78 ML/MIN/1.7M2
GLUCOSE SERPL-MCNC: 92 MG/DL (ref 70–99)
HDLC SERPL-MCNC: 48 MG/DL
LDLC SERPL CALC-MCNC: 230 MG/DL
NONHDLC SERPL-MCNC: 269 MG/DL
POTASSIUM SERPL-SCNC: 4.1 MMOL/L (ref 3.4–5.3)
PROT SERPL-MCNC: 6.9 G/DL (ref 6.8–8.8)
PSA SERPL-ACNC: 6.63 UG/L (ref 0–4)
SODIUM SERPL-SCNC: 142 MMOL/L (ref 133–144)
TRIGL SERPL-MCNC: 196 MG/DL

## 2017-07-26 PROCEDURE — 86803 HEPATITIS C AB TEST: CPT | Performed by: PHYSICIAN ASSISTANT

## 2017-07-26 PROCEDURE — 36415 COLL VENOUS BLD VENIPUNCTURE: CPT | Performed by: PHYSICIAN ASSISTANT

## 2017-07-26 PROCEDURE — 80061 LIPID PANEL: CPT | Performed by: PHYSICIAN ASSISTANT

## 2017-07-26 PROCEDURE — G0103 PSA SCREENING: HCPCS | Performed by: PHYSICIAN ASSISTANT

## 2017-07-26 PROCEDURE — 82550 ASSAY OF CK (CPK): CPT | Performed by: PHYSICIAN ASSISTANT

## 2017-07-26 PROCEDURE — 80053 COMPREHEN METABOLIC PANEL: CPT | Performed by: PHYSICIAN ASSISTANT

## 2017-07-26 PROCEDURE — 82306 VITAMIN D 25 HYDROXY: CPT | Performed by: PHYSICIAN ASSISTANT

## 2017-07-26 ASSESSMENT — PATIENT HEALTH QUESTIONNAIRE - PHQ9: SUM OF ALL RESPONSES TO PHQ QUESTIONS 1-9: 1

## 2017-07-26 ASSESSMENT — ASTHMA QUESTIONNAIRES: ACT_TOTALSCORE: 22

## 2017-07-27 LAB
DEPRECATED CALCIDIOL+CALCIFEROL SERPL-MC: 29 UG/L (ref 20–75)
HCV AB SERPL QL IA: NORMAL

## 2017-07-31 NOTE — PROGRESS NOTES
Campbell,    Your screening test for hepatitis C was negative.  Your vitamin D level is low.  Start taking vitamin D3 2000U/day with food.  Your CK (muscle enzyme test) is normal and I'd like you to go back on crestor EVERY OTHER DAY IN COMBINATION WITH CO Q 10 to see if you will tolerate the statin. Fixing the vitamin D deficiency can also help you tolerate your statin.    Your blood sugar, electrolytes, kidney and liver functions were normal.  Your cholesterol looks very high off of the crestor.    Your PSA is still elevated at 6.63, but lower than last year.  Follow up with your urologist.    Let me know if you have any questions.    Sandra Guardado PA-C

## 2017-10-17 DIAGNOSIS — E78.5 HYPERLIPIDEMIA WITH TARGET LDL LESS THAN 130: ICD-10-CM

## 2017-10-17 NOTE — TELEPHONE ENCOUNTER
rosuvastatin (CRESTOR) 5 MG tablet        Last Written Prescription Date:  ?  Last Fill Quantity: ?,   # refills: ?  Last Office Visit with [facility], [facility] or [facility] prescribing provider: 7/25/2017  Future Office visit:       Routing refill request to provider for review/approval because:  Drug not active on patient's medication list

## 2017-10-18 NOTE — TELEPHONE ENCOUNTER
Attempt # to [phone].    Left non-detailed VM for patient to call back.    ADRIA Sow, RN, PHN

## 2017-10-18 NOTE — TELEPHONE ENCOUNTER
Call pt, he told me he stopped this medication due to myalgias.  Is the pharmacy or pt requesting the refill?

## 2017-10-25 RX ORDER — ROSUVASTATIN CALCIUM 5 MG/1
TABLET, COATED ORAL
Qty: 90 TABLET | Refills: 0 | Status: SHIPPED | OUTPATIENT
Start: 2017-10-25 | End: 2018-03-18

## 2017-10-25 NOTE — TELEPHONE ENCOUNTER
Pharmacy called to get update on Rx   Spoke with Torie morse [facility] she stated the PT   Requested the Refill   Ph. For pharmacy [phone]

## 2017-12-05 ENCOUNTER — TRANSFERRED RECORDS (OUTPATIENT)
Dept: HEALTH INFORMATION MANAGEMENT | Facility: CLINIC | Age: 52
End: 2017-12-05

## 2017-12-18 ENCOUNTER — OFFICE VISIT (OUTPATIENT)
Dept: FAMILY MEDICINE | Facility: CLINIC | Age: 52
End: 2017-12-18
Payer: COMMERCIAL

## 2017-12-18 ENCOUNTER — TELEPHONE (OUTPATIENT)
Dept: FAMILY MEDICINE | Facility: CLINIC | Age: 52
End: 2017-12-18

## 2017-12-18 VITALS
WEIGHT: 247 LBS | TEMPERATURE: 97.6 F | HEIGHT: 70 IN | HEART RATE: 99 BPM | DIASTOLIC BLOOD PRESSURE: 81 MMHG | OXYGEN SATURATION: 94 % | SYSTOLIC BLOOD PRESSURE: 117 MMHG | BODY MASS INDEX: 35.36 KG/M2

## 2017-12-18 DIAGNOSIS — J45.41 MODERATE PERSISTENT ASTHMA WITH EXACERBATION: Primary | ICD-10-CM

## 2017-12-18 PROCEDURE — 99213 OFFICE O/P EST LOW 20 MIN: CPT | Performed by: PHYSICIAN ASSISTANT

## 2017-12-18 RX ORDER — PREDNISONE 20 MG/1
20 TABLET ORAL 2 TIMES DAILY
Qty: 10 TABLET | Refills: 0 | Status: SHIPPED | OUTPATIENT
Start: 2017-12-18 | End: 2018-01-10

## 2017-12-18 NOTE — MR AVS SNAPSHOT
"              After Visit Summary   12/18/2017    Romelia Crystal    MRN: [id number]           Patient Information     Date Of Birth          1965        Visit Information        Provider Department      12/18/2017 12:15 PM Sandra Guardado PA-C [facility]        Today's Diagnoses     Moderate persistent asthma with exacerbation    -  1       Follow-ups after your visit        Who to contact     If you have questions or need follow up information about today's clinic visit or your schedule please contact [facility] directly at [phone].  Normal or non-critical lab and imaging results will be communicated to you by AwayFindhart, letter or phone within 4 business days after the clinic has received the results. If you do not hear from us within 7 days, please contact the clinic through SkyTecht or phone. If you have a critical or abnormal lab result, we will notify you by phone as soon as possible.  Submit refill requests through KirkeWeb or call your pharmacy and they will forward the refill request to us. Please allow 3 business days for your refill to be completed.          Additional Information About Your Visit        MyChart Information     [employer]ir[employer] gives you secure access to your electronic health record. If you see a primary care provider, you can also send messages to your care team and make appointments. If you have questions, please call your primary care clinic.  If you do not have a primary care provider, please call [phone] and they will assist you.        Care EveryWhere ID     This is your Care EveryWhere ID. This could be used by other organizations to access your [place] medical records  [id number]        Your Vitals Were     Pulse Temperature Height Pulse Oximetry BMI (Body Mass Index)       99 97.6  F (36.4  C) (Tympanic) 5' 10\" (1.778 m) 94% 35.44 kg/m2        Blood Pressure from Last 3 Encounters:   12/18/17 117/81   07/25/17 128/83   01/15/17 121/88    Weight from " Last 3 Encounters:   12/18/17 247 lb (112 kg)   07/25/17 249 lb 4.8 oz (113.1 kg)   01/14/17 243 lb (110.2 kg)              Today, you had the following     No orders found for display         Today's Medication Changes          These changes are accurate as of: 12/18/17 12:24 PM.  If you have any questions, ask your nurse or doctor.               Start taking these medicines.        Dose/Directions    predniSONE 20 MG tablet   Commonly known as:  DELTASONE   Used for:  Moderate persistent asthma with exacerbation   Started by:  Sandra Guardado PA-C        Dose:  20 mg   Take 1 tablet (20 mg) by mouth 2 times daily   Quantity:  10 tablet   Refills:  0         These medicines have changed or have updated prescriptions.        Dose/Directions    loratadine 10 MG tablet   Commonly known as:  CLARITIN   This may have changed:    - when to take this  - reasons to take this   Used for:  Allergic rhinitis, mild        Dose:  10 mg   Take 1 tablet (10 mg) by mouth daily   Quantity:  30 tablet   Refills:  0            Where to get your medicines      These medications were sent to [facility] #[id number] - [place] [address] [place] [address] [address]     Phone:  [phone]     predniSONE 20 MG tablet                Primary Care Provider Office Phone # Fax #    Sandra Guardado PA-C [phone] [address] [facility] [address]  [facility] [address]        Equal Access to Services     VIVIEN GRANADOS AH: Hadii rosenda ku hadasho Soconnieali, waaxda luqadaha, qaybta kaalmada adeegyada, herber thompson . So [facility] [phone].    ATENCIÓN: Si habla español, tiene a salgado disposición servicios gratuitos de asistencia lingüística. Llame al [phone].    We comply with applicable federal civil rights laws and [place] laws. We do not discriminate on the basis of race, color, national origin, age, disability, sex, sexual orientation, or gender identity.            Thank you!      Thank you for choosing [facility]  for your care. Our goal is always to provide you with excellent care. Hearing back from our patients is one way we can continue to improve our services. Please take a few minutes to complete the written survey that you may receive in the mail after your visit with us. Thank you!             Your Updated Medication List - Protect others around you: Learn how to safely use, store and throw away your medicines at www.disposemymeds.org.          This list is accurate as of: 12/18/17 12:24 PM.  Always use your most recent med list.                   Brand Name Dispense Instructions for use Diagnosis    albuterol 108 (90 BASE) MCG/ACT Inhaler    PROAIR HFA/PROVENTIL HFA/VENTOLIN HFA    1 Inhaler    Inhale 2 puffs into the lungs every 6 hours    Mild persistent asthma, uncomplicated       amLODIPine 5 MG tablet    NORVASC    90 tablet    Take 1 tablet (5 mg) by mouth daily    Essential hypertension, benign       buPROPion 300 MG 24 hr tablet    WELLBUTRIN XL    90 tablet    Take 1 tablet (300 mg) by mouth every morning    Mild recurrent major depression (H)       fluticasone-salmeterol 250-50 MCG/DOSE diskus inhaler    ADVAIR    3 Inhaler    Inhale 1 puff into the lungs 2 times daily    Mild persistent asthma, uncomplicated       loratadine 10 MG tablet    CLARITIN    30 tablet    Take 1 tablet (10 mg) by mouth daily    Allergic rhinitis, mild       predniSONE 20 MG tablet    DELTASONE    10 tablet    Take 1 tablet (20 mg) by mouth 2 times daily    Moderate persistent asthma with exacerbation       rosuvastatin 5 MG tablet    CRESTOR    90 tablet    Take 1 tablet by mouth once daily    Hyperlipidemia with target LDL less than 130       venlafaxine 75 MG 24 hr capsule    EFFEXOR-XR     Take 150 mg by mouth daily

## 2017-12-18 NOTE — TELEPHONE ENCOUNTER
Romelia Crystal   Male, 52 year old, 1965  Weight:   249 lb 4.8 oz (113.1 kg)  Phone:  [phone]  PCP:   Sandra Guardado PA-C  Language:   English  Need Interp:   No  Allergies:  Hmg-coa-r Inhibitors  Lisinopril  Health Maintenance:   Due  FYI:  None  Primary Ins:   HP  MRN:   [id number]  MyChart:   Active  Next Appt w/Me:   None   More Detail >>     Appointment Request     Romelia Crystal     Sent: Fozia 2017  2:26 PM      Romelia Crystal     MRN: [id number] :     1965     Pt Work: [phone] Pt Home:     [phone]     Entered: [phone]       Message      Appointment Request From: Romelia CAHRBELMinerva Marah          With Provider: Sandra Guardado PA-C [[facility]]          Preferred Date Range: From 2017 To 2017          Preferred Times: Any          Reason for visit: Request an Appointment          Comments:     Having issues with my asthma that is out of the ordinary. I had to use my nebulizer twice this weekend.

## 2017-12-18 NOTE — NURSING NOTE
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    Weight as of this encounter: 247 lb (112 kg).  Medication Reconciliation: complete    "

## 2017-12-18 NOTE — TELEPHONE ENCOUNTER
To PCP:  Are you able to see patient today for a Asthma Flare?  Please advise.  Thank you.  Cheryl Mcarthur RN

## 2017-12-18 NOTE — PROGRESS NOTES
HPI: 53 yo male with asthma here with sore throat and pain in lungs x 4d  He is coughing more  Has laryngitis but thinks that due to Advair (compliant with bid use daily)  He has trouble taking a full breath without cough and feels tight  No sick contacts, wife not ill  Thinking of getting out of state since off for the holidays  Cough is dry, nonprod  Denies fever or chills  He has some chronic nasal congestion but that isn't different  He took a neb which did help    2. Hx of elevated PSA  He did see urol (Dr. López) for this and had MRI and results pending.    3. He is tolerating crestor 5mg every other day without myalgias.    Past Medical History:   Diagnosis Date     Allergic rhinitis, mild     with cough     Eczema      HTN (hypertension), benign      Hyperlipidemia LDL goal < 130      Mild persistent asthma      Mild persistent asthma      Mild recurrent major depression (H) 6/12/2012    Followed by Rivera Dinero at [facility]     Past Surgical History:   Procedure Laterality Date     TONSILLECTOMY       wisdom teeth       Social History   Substance Use Topics     Smoking status: Never Smoker     Smokeless tobacco: Never Used     Alcohol use 0.0 oz/week     0 Standard drinks or equivalent per week      Comment: few beers on weekend     Current Outpatient Prescriptions   Medication Sig Dispense Refill     predniSONE (DELTASONE) 20 MG tablet Take 1 tablet (20 mg) by mouth 2 times daily 10 tablet 0     rosuvastatin (CRESTOR) 5 MG tablet Take 1 tablet by mouth once daily 90 tablet 0     amLODIPine (NORVASC) 5 MG tablet Take 1 tablet (5 mg) by mouth daily 90 tablet 3     fluticasone-salmeterol (ADVAIR) 250-50 MCG/DOSE diskus inhaler Inhale 1 puff into the lungs 2 times daily 3 Inhaler 3     albuterol (PROAIR HFA/PROVENTIL HFA/VENTOLIN HFA) 108 (90 BASE) MCG/ACT Inhaler Inhale 2 puffs into the lungs every 6 hours 1 Inhaler 6     loratadine (CLARITIN) 10 MG tablet Take 1 tablet (10 mg) by mouth daily (Patient taking  "differently: Take 10 mg by mouth as needed ) 30 tablet 0     buPROPion (WELLBUTRIN XL) 300 MG 24 hr tablet Take 1 tablet (300 mg) by mouth every morning 90 tablet 1     venlafaxine (EFFEXOR-XR) 75 MG 24 hr capsule Take 150 mg by mouth daily        Allergies   Allergen Reactions     Hmg-Coa-R Inhibitors Other (See Comments)     Some Statins Muscle aches      Lisinopril Cough     FAMILY HISTORY NOTED AND REVIEWED    PHYSICAL EXAM:    /81 (Cuff Size: Adult Large)  Pulse 99  Temp 97.6  F (36.4  C) (Tympanic)  Ht 5' 10\" (1.778 m)  Wt 247 lb (112 kg)  SpO2 94%  BMI 35.44 kg/m2    Patient appears non toxic  Ears; TMs pearly grey  Throat: no erythema or exudates  Neck: supple without LA  Lungs: faint exp wheeze throughout on forced expirations.    Assessment and Plan:     (J45.41) Moderate persistent asthma with exacerbation  (primary encounter diagnosis)  Comment: recd he start using his nebs TID.  Stay on Advair as prescribed. If wheezing worsens or persists, recd he fill prescription for prednisone that is provided.  He should also restart claritin since has 2 cats and they do sleep in their bedroom.  Recd good vacuuming as well  Plan: predniSONE (DELTASONE) 20 MG tablet        Bid with food x 5d.      Sandra Guardado PA-C                "

## 2017-12-19 ENCOUNTER — APPOINTMENT (OUTPATIENT)
Dept: CT IMAGING | Facility: CLINIC | Age: 52
DRG: 872 | End: 2017-12-19
Attending: EMERGENCY MEDICINE
Payer: COMMERCIAL

## 2017-12-19 ENCOUNTER — HOSPITAL ENCOUNTER (INPATIENT)
Facility: CLINIC | Age: 52
LOS: 2 days | Discharge: HOME | DRG: 872 | End: 2017-12-21
Attending: EMERGENCY MEDICINE | Admitting: INTERNAL MEDICINE
Payer: COMMERCIAL

## 2017-12-19 DIAGNOSIS — A41.9 SEPSIS, DUE TO UNSPECIFIED ORGANISM: ICD-10-CM

## 2017-12-19 DIAGNOSIS — K57.32 DIVERTICULITIS OF COLON: ICD-10-CM

## 2017-12-19 LAB
ALBUMIN SERPL-MCNC: 3.9 G/DL (ref 3.4–5)
ALP SERPL-CCNC: 86 U/L (ref 40–150)
ALT SERPL W P-5'-P-CCNC: 28 U/L (ref 0–70)
ANION GAP SERPL CALCULATED.3IONS-SCNC: 4 MMOL/L (ref 3–14)
AST SERPL W P-5'-P-CCNC: 14 U/L (ref 0–45)
BASOPHILS # BLD AUTO: 0 10E9/L (ref 0–0.2)
BASOPHILS NFR BLD AUTO: 0.4 %
BILIRUB SERPL-MCNC: 0.6 MG/DL (ref 0.2–1.3)
BUN SERPL-MCNC: 11 MG/DL (ref 7–30)
CALCIUM SERPL-MCNC: 8.9 MG/DL (ref 8.5–10.1)
CHLORIDE SERPL-SCNC: 104 MMOL/L (ref 94–109)
CO2 SERPL-SCNC: 30 MMOL/L (ref 20–32)
CREAT SERPL-MCNC: 1.1 MG/DL (ref 0.66–1.25)
DIFFERENTIAL METHOD BLD: ABNORMAL
EOSINOPHIL # BLD AUTO: 0.2 10E9/L (ref 0–0.7)
EOSINOPHIL NFR BLD AUTO: 1.4 %
ERYTHROCYTE [DISTWIDTH] IN BLOOD BY AUTOMATED COUNT: 13.1 % (ref 10–15)
GFR SERPL CREATININE-BSD FRML MDRD: 70 ML/MIN/1.7M2
GLUCOSE SERPL-MCNC: 111 MG/DL (ref 70–99)
HCT VFR BLD AUTO: 41.4 % (ref 40–53)
HGB BLD-MCNC: 14.6 G/DL (ref 13.3–17.7)
IMM GRANULOCYTES # BLD: 0 10E9/L (ref 0–0.4)
IMM GRANULOCYTES NFR BLD: 0.2 %
LACTATE SERPL-SCNC: 1.1 MMOL/L (ref 0.4–2)
LYMPHOCYTES # BLD AUTO: 1.5 10E9/L (ref 0.8–5.3)
LYMPHOCYTES NFR BLD AUTO: 13.7 %
MCH RBC QN AUTO: 30.2 PG (ref 26.5–33)
MCHC RBC AUTO-ENTMCNC: 35.3 G/DL (ref 31.5–36.5)
MCV RBC AUTO: 86 FL (ref 78–100)
MONOCYTES # BLD AUTO: 0.9 10E9/L (ref 0–1.3)
MONOCYTES NFR BLD AUTO: 8.1 %
NEUTROPHILS # BLD AUTO: 8.5 10E9/L (ref 1.6–8.3)
NEUTROPHILS NFR BLD AUTO: 76.2 %
NRBC # BLD AUTO: 0 10*3/UL
NRBC BLD AUTO-RTO: 0 /100
PLATELET # BLD AUTO: 223 10E9/L (ref 150–450)
POTASSIUM SERPL-SCNC: 3.8 MMOL/L (ref 3.4–5.3)
PROT SERPL-MCNC: 7 G/DL (ref 6.8–8.8)
RBC # BLD AUTO: 4.84 10E12/L (ref 4.4–5.9)
SODIUM SERPL-SCNC: 138 MMOL/L (ref 133–144)
WBC # BLD AUTO: 11.2 10E9/L (ref 4–11)

## 2017-12-19 PROCEDURE — 85025 COMPLETE CBC W/AUTO DIFF WBC: CPT | Performed by: EMERGENCY MEDICINE

## 2017-12-19 PROCEDURE — 99285 EMERGENCY DEPT VISIT HI MDM: CPT | Mod: 25

## 2017-12-19 PROCEDURE — 96375 TX/PRO/DX INJ NEW DRUG ADDON: CPT

## 2017-12-19 PROCEDURE — 25000128 H RX IP 250 OP 636: Performed by: EMERGENCY MEDICINE

## 2017-12-19 PROCEDURE — 74177 CT ABD & PELVIS W/CONTRAST: CPT

## 2017-12-19 PROCEDURE — 99222 1ST HOSP IP/OBS MODERATE 55: CPT | Mod: AI | Performed by: INTERNAL MEDICINE

## 2017-12-19 PROCEDURE — 80053 COMPREHEN METABOLIC PANEL: CPT | Performed by: EMERGENCY MEDICINE

## 2017-12-19 PROCEDURE — 25000125 ZZHC RX 250: Performed by: EMERGENCY MEDICINE

## 2017-12-19 PROCEDURE — 96365 THER/PROPH/DIAG IV INF INIT: CPT | Mod: XU

## 2017-12-19 PROCEDURE — 83605 ASSAY OF LACTIC ACID: CPT | Performed by: EMERGENCY MEDICINE

## 2017-12-19 PROCEDURE — 96361 HYDRATE IV INFUSION ADD-ON: CPT

## 2017-12-19 PROCEDURE — 12000000 ZZH R&B MED SURG/OB

## 2017-12-19 PROCEDURE — 87040 BLOOD CULTURE FOR BACTERIA: CPT | Performed by: EMERGENCY MEDICINE

## 2017-12-19 RX ORDER — ONDANSETRON 2 MG/ML
4 INJECTION INTRAMUSCULAR; INTRAVENOUS
Status: DISCONTINUED | OUTPATIENT
Start: 2017-12-19 | End: 2017-12-20

## 2017-12-19 RX ORDER — ALBUTEROL SULFATE 0.83 MG/ML
1 SOLUTION RESPIRATORY (INHALATION) EVERY 6 HOURS PRN
COMMUNITY
End: 2018-08-14

## 2017-12-19 RX ORDER — MORPHINE SULFATE 4 MG/ML
4 INJECTION, SOLUTION INTRAMUSCULAR; INTRAVENOUS EVERY 30 MIN PRN
Status: DISCONTINUED | OUTPATIENT
Start: 2017-12-19 | End: 2017-12-20 | Stop reason: ALTCHOICE

## 2017-12-19 RX ORDER — IOPAMIDOL 755 MG/ML
120 INJECTION, SOLUTION INTRAVASCULAR ONCE
Status: COMPLETED | OUTPATIENT
Start: 2017-12-19 | End: 2017-12-19

## 2017-12-19 RX ADMIN — ONDANSETRON 4 MG: 2 INJECTION INTRAMUSCULAR; INTRAVENOUS at 21:40

## 2017-12-19 RX ADMIN — SODIUM CHLORIDE 78 ML: 9 INJECTION, SOLUTION INTRAVENOUS at 22:16

## 2017-12-19 RX ADMIN — SODIUM CHLORIDE 1000 ML: 9 INJECTION, SOLUTION INTRAVENOUS at 21:40

## 2017-12-19 RX ADMIN — PIPERACILLIN SODIUM AND TAZOBACTAM SODIUM 3.38 G: 36; 4.5 INJECTION, POWDER, FOR SOLUTION INTRAVENOUS at 23:28

## 2017-12-19 RX ADMIN — MORPHINE SULFATE 4 MG: 4 INJECTION, SOLUTION INTRAMUSCULAR; INTRAVENOUS at 21:40

## 2017-12-19 RX ADMIN — IOPAMIDOL 120 ML: 755 INJECTION, SOLUTION INTRAVENOUS at 22:15

## 2017-12-19 NOTE — IP AVS SNAPSHOT
[facility] [address] [facility]    [address] [place] [address]    Phone:  [phone]                                       After Visit Summary   12/19/2017    Romelia Crystal    MRN: [id number]           After Visit Summary Signature Page     I have received my discharge instructions, and my questions have been answered. I have discussed any challenges I see with this plan with the nurse or doctor.    ..........................................................................................................................................  Patient/Patient Representative Signature      ..........................................................................................................................................  Patient Representative Print Name and Relationship to Patient    ..................................................               ................................................  Date                                            Time    ..........................................................................................................................................  Reviewed by Signature/Title    ...................................................              ..............................................  Date                                                            Time

## 2017-12-19 NOTE — IP AVS SNAPSHOT
MRN:[id number]                      After Visit Summary   12/19/2017    Romelia Crystal    MRN: [id number]           Thank you!     Thank you for choosing [facility] for your care. Our goal is always to provide you with excellent care. Hearing back from our patients is one way we can continue to improve our services. Please take a few minutes to complete the written survey that you may receive in the mail after you visit with us. Thank you!        Patient Information     Date Of Birth          1965        Designated Caregiver       Most Recent Value    Caregiver    Will someone help with your care after discharge? yes    Name of designated caregiver Azul    Phone number of caregiver [phone]    Caregiver address [place]      About your hospital stay     You were admitted on:  December 19, 2017 You last received care in the:  [facility]    You were discharged on:  December 21, 2017        Reason for your hospital stay       Diverticulitis of the descending colon.                  Who to Call     For medical emergencies, please call 911.  For non-urgent questions about your medical care, please call your primary care provider or clinic, [phone]          Attending Provider     Provider Specialty    Donn Keating MD Emergency Medicine    Gordy, Harry Sanches MD Internal Medicine       Primary Care Provider Office Phone # Fax #    Sandra Guardado PA-C [phone]      After Care Instructions     Activity       Your activity upon discharge: activity as tolerated.            Diet       Follow this diet upon discharge: Low residue diet for 2 weeks                  Follow-up Appointments     Follow-up and recommended labs and tests        Follow up with primary care provider, Sandra Guardado, within 2 weeks, for hospital follow- up. No follow up labs or test are needed.                  Pending Results     Date and Time Order Name  "Status Description    12/19/2017 2116 Blood culture Preliminary     12/19/2017 2116 Blood culture Preliminary             Statement of Approval     Ordered          12/21/17 1317  I have reviewed and agree with all the recommendations and orders detailed in this document.  EFFECTIVE NOW     Approved and electronically signed by:  Nabor Rogers MD             Admission Information     Date & Time Provider Department Dept. Phone    12/19/2017 Harry Kaminski MD [facility] [facility] [phone]      Your Vitals Were     Blood Pressure Pulse Temperature Respirations Height Weight    120/81 (BP Location: Right arm) 71 98.1  F (36.7  C) (Oral) 16 1.778 m (5' 10\") 112.4 kg (247 lb 12.8 oz)    Pulse Oximetry BMI (Body Mass Index)                96% 35.56 kg/m2          MyChart Information     [facility] gives you secure access to your electronic health record. If you see a primary care provider, you can also send messages to your care team and make appointments. If you have questions, please call your primary care clinic.  If you do not have a primary care provider, please call [phone] and they will assist you.        Care EveryWhere ID     This is your Care EveryWhere ID. This could be used by other organizations to access your [facility] medical records  [id number]        Equal Access to Services     SANDEEP GRANADOS : Hadii rosenda Cuadra, waaxda luqadaha, qaybta kaalmada sam, herber hernandez. So [facility] [phone].    ATENCIÓN: Si habla español, tiene a salgado disposición servicios gratuitos de asistencia lingüística. Llame al [phone].    We comply with applicable federal civil rights laws and [place] laws. We do not discriminate on the basis of race, color, national origin, age, disability, sex, sexual orientation, or gender identity.               Review of your medicines      START taking        Dose / Directions    ciprofloxacin " 500 MG tablet   Commonly known as:  CIPRO   Used for:  Diverticulitis of colon   Notes to Patient:  Make sure to finish all antibiotics.        Dose:  500 mg   Take 1 tablet (500 mg) by mouth 2 times daily for 7 days   Quantity:  14 tablet   Refills:  0       metroNIDAZOLE 500 MG tablet   Commonly known as:  FLAGYL   Used for:  Diverticulitis of colon   Notes to Patient:  Make sure to finish all antibiotics.         Dose:  500 mg   Take 1 tablet (500 mg) by mouth 3 times daily for 7 days   Quantity:  21 tablet   Refills:  0         CONTINUE these medicines which may have CHANGED, or have new prescriptions. If we are uncertain of the size of tablets/capsules you have at home, strength may be listed as something that might have changed.        Dose / Directions    * albuterol (2.5 MG/3ML) 0.083% neb solution   This may have changed:  Another medication with the same name was changed. Make sure you understand how and when to take each.        Dose:  1 vial   Take 1 vial by nebulization every 6 hours as needed for shortness of breath / dyspnea or wheezing   Refills:  0       * albuterol 108 (90 BASE) MCG/ACT Inhaler   Commonly known as:  PROAIR HFA/PROVENTIL HFA/VENTOLIN HFA   This may have changed:    - when to take this  - reasons to take this   Used for:  Mild persistent asthma, uncomplicated        Dose:  2 puff   Inhale 2 puffs into the lungs every 6 hours   Quantity:  1 Inhaler   Refills:  6       loratadine 10 MG tablet   Commonly known as:  CLARITIN   This may have changed:    - when to take this  - reasons to take this   Used for:  Allergic rhinitis, mild        Dose:  10 mg   Take 1 tablet (10 mg) by mouth daily   Quantity:  30 tablet   Refills:  0       * Notice:  This list has 2 medication(s) that are the same as other medications prescribed for you. Read the directions carefully, and ask your doctor or other care provider to review them with you.      CONTINUE these medicines which have NOT CHANGED         Dose / Directions    amLODIPine 5 MG tablet   Commonly known as:  NORVASC   Used for:  Essential hypertension, benign        Dose:  5 mg   Take 1 tablet (5 mg) by mouth daily   Quantity:  90 tablet   Refills:  3       buPROPion 300 MG 24 hr tablet   Commonly known as:  WELLBUTRIN XL   Used for:  Mild recurrent major depression (H)        Dose:  300 mg   Take 1 tablet (300 mg) by mouth every morning   Quantity:  90 tablet   Refills:  1       fluticasone-salmeterol 250-50 MCG/DOSE diskus inhaler   Commonly known as:  ADVAIR   Used for:  Mild persistent asthma, uncomplicated        Dose:  1 puff   Inhale 1 puff into the lungs 2 times daily   Quantity:  3 Inhaler   Refills:  3       predniSONE 20 MG tablet   Commonly known as:  DELTASONE   Used for:  Moderate persistent asthma with exacerbation        Dose:  20 mg   Take 1 tablet (20 mg) by mouth 2 times daily   Quantity:  10 tablet   Refills:  0       rosuvastatin 5 MG tablet   Commonly known as:  CRESTOR   Used for:  Hyperlipidemia with target LDL less than 130        Take 1 tablet by mouth once daily   Quantity:  90 tablet   Refills:  0       venlafaxine 75 MG 24 hr capsule   Commonly known as:  EFFEXOR-XR        Dose:  150 mg   Take 150 mg by mouth daily   Refills:  0            Where to get your medicines      These medications were sent to [facility] [place], [place] - [address]  [address] [place] [address], [place] [address]     Phone:  [phone]     ciprofloxacin 500 MG tablet    metroNIDAZOLE 500 MG tablet               ANTIBIOTIC INSTRUCTION     You've Been Prescribed an Antibiotic - Now What?  Your healthcare team thinks that you or your loved one might have an infection. Some infections can be treated with antibiotics, which are powerful, life-saving drugs. Like all medications, antibiotics have side effects and should only be used when necessary. There are some important things you should know about your antibiotic treatment.      Your  healthcare team may run tests before you start taking an antibiotic.    Your team may take samples (e.g., from your blood, urine or other areas) to run tests to look for bacteria. These test can be important to determine if you need an antibiotic at all and, if you do, which antibiotic will work best.      Within a few days, your healthcare team might change or even stop your antibiotic.    Your team may start you on an antibiotic while they are working to find out what is making you sick.    Your team might change your antibiotic because test results show that a different antibiotic would be better to treat your infection.    In some cases, once your team has more information, they learn that you do not need an antibiotic at all. They may find out that you don't have an infection, or that the antibiotic you're taking won't work against your infection. For example, an infection caused by a virus can't be treated with antibiotics. Staying on an antibiotic when you don't need it is more likely to be harmful than helpful.      You may experience side effects from your antibiotic.    Like all medications, antibiotics have side effects. Some of these can be serious.    Let you healthcare team know if you have any known allergies when you are admitted to the hospital.    One significant side effect of nearly all antibiotics is the risk of severe and sometimes deadly diarrhea caused by Clostridium difficile (C. Difficile). This occurs when a person takes antibiotics because some good germs are destroyed. Antibiotic use allows C. diificile to take over, putting patients at high risk for this serious infection.    As a patient or caregiver, it is important to understand your or your loved one's antibiotic treatment. It is especially important for caregivers to speak up when patients can't speak for themselves. Here are some important questions to ask your healthcare team.    What infection is this antibiotic treating and how  do you know I have that infection?    What side effects might occur from this antibiotic?    How long will I need to take this antibiotic?    Is it safe to take this antibiotic with other medications or supplements (e.g., vitamins) that I am taking?     Are there any special directions I need to know about taking this antibiotic? For example, should I take it with food?    How will I be monitored to know whether my infection is responding to the antibiotic?    What tests may help to make sure the right antibiotic is prescribed for me?      Information provided by:  www.cdc.gov/getsmart  U.S. Department of Health and Human Services  Centers for disease Control and Prevention  National Center for Emerging and Zoonotic Infectious Diseases  Division of Healthcare Quality Promotion         Protect others around you: Learn how to safely use, store and throw away your medicines at www.disposemymeds.org.             Medication List: This is a list of all your medications and when to take them. Check marks below indicate your daily home schedule. Keep this list as a reference.      Medications           Morning Afternoon Evening Bedtime As Needed    * albuterol (2.5 MG/3ML) 0.083% neb solution   Take 1 vial by nebulization every 6 hours as needed for shortness of breath / dyspnea or wheezing   Next Dose Due:  Resume home schedule                                    * albuterol 108 (90 BASE) MCG/ACT Inhaler   Commonly known as:  PROAIR HFA/PROVENTIL HFA/VENTOLIN HFA   Inhale 2 puffs into the lungs every 6 hours   Next Dose Due:  Resume home schedule                                amLODIPine 5 MG tablet   Commonly known as:  NORVASC   Take 1 tablet (5 mg) by mouth daily   Last time this was given:  5 mg on 12/21/2017  8:37 AM   Next Dose Due:  12/22/2017 9am                                    buPROPion 300 MG 24 hr tablet   Commonly known as:  WELLBUTRIN XL   Take 1 tablet (300 mg) by mouth every morning   Last time this was  given:  300 mg on 12/21/2017  8:36 AM   Next Dose Due:  12/22/2017 9am                                    ciprofloxacin 500 MG tablet   Commonly known as:  CIPRO   Take 1 tablet (500 mg) by mouth 2 times daily for 7 days   Next Dose Due:  12/21/2017 5pm.    Notes to Patient:  Make sure to finish all antibiotics.                                      fluticasone-salmeterol 250-50 MCG/DOSE diskus inhaler   Commonly known as:  ADVAIR   Inhale 1 puff into the lungs 2 times daily   Next Dose Due:  Resume home schedule                                       loratadine 10 MG tablet   Commonly known as:  CLARITIN   Take 1 tablet (10 mg) by mouth daily   Last time this was given:  10 mg on 12/21/2017  8:37 AM   Next Dose Due:  12/22/2017 9 am                                    metroNIDAZOLE 500 MG tablet   Commonly known as:  FLAGYL   Take 1 tablet (500 mg) by mouth 3 times daily for 7 days   Next Dose Due:  12/22/2017 5 pm    Notes to Patient:  Make sure to finish all antibiotics.                                          predniSONE 20 MG tablet   Commonly known as:  DELTASONE   Take 1 tablet (20 mg) by mouth 2 times daily   Next Dose Due:  12/21/2017 5pm                                       rosuvastatin 5 MG tablet   Commonly known as:  CRESTOR   Take 1 tablet by mouth once daily                                   venlafaxine 75 MG 24 hr capsule   Commonly known as:  EFFEXOR-XR   Take 150 mg by mouth daily   Last time this was given:  150 mg on 12/21/2017  8:36 AM   Next Dose Due:  12/22/2017 9am                                    * Notice:  This list has 2 medication(s) that are the same as other medications prescribed for you. Read the directions carefully, and ask your doctor or other care provider to review them with you.

## 2017-12-20 PROBLEM — K57.92 DIVERTICULITIS: Status: ACTIVE | Noted: 2017-12-20

## 2017-12-20 LAB
ALBUMIN UR-MCNC: 10 MG/DL
ANION GAP SERPL CALCULATED.3IONS-SCNC: 4 MMOL/L (ref 3–14)
APPEARANCE UR: CLEAR
BILIRUB UR QL STRIP: NEGATIVE
BUN SERPL-MCNC: 10 MG/DL (ref 7–30)
CALCIUM SERPL-MCNC: 8.4 MG/DL (ref 8.5–10.1)
CHLORIDE SERPL-SCNC: 106 MMOL/L (ref 94–109)
CO2 SERPL-SCNC: 30 MMOL/L (ref 20–32)
COLOR UR AUTO: YELLOW
CREAT SERPL-MCNC: 1.1 MG/DL (ref 0.66–1.25)
ERYTHROCYTE [DISTWIDTH] IN BLOOD BY AUTOMATED COUNT: 13.4 % (ref 10–15)
GFR SERPL CREATININE-BSD FRML MDRD: 70 ML/MIN/1.7M2
GLUCOSE SERPL-MCNC: 109 MG/DL (ref 70–99)
GLUCOSE UR STRIP-MCNC: NEGATIVE MG/DL
HCT VFR BLD AUTO: 38.8 % (ref 40–53)
HGB BLD-MCNC: 13.1 G/DL (ref 13.3–17.7)
HGB UR QL STRIP: NEGATIVE
KETONES UR STRIP-MCNC: NEGATIVE MG/DL
LEUKOCYTE ESTERASE UR QL STRIP: NEGATIVE
MCH RBC QN AUTO: 29.6 PG (ref 26.5–33)
MCHC RBC AUTO-ENTMCNC: 33.8 G/DL (ref 31.5–36.5)
MCV RBC AUTO: 88 FL (ref 78–100)
MUCOUS THREADS #/AREA URNS LPF: PRESENT /LPF
NITRATE UR QL: NEGATIVE
PH UR STRIP: 6 PH (ref 5–7)
PLATELET # BLD AUTO: 184 10E9/L (ref 150–450)
POTASSIUM SERPL-SCNC: 3.9 MMOL/L (ref 3.4–5.3)
RBC # BLD AUTO: 4.42 10E12/L (ref 4.4–5.9)
RBC #/AREA URNS AUTO: 0 /HPF (ref 0–2)
SODIUM SERPL-SCNC: 140 MMOL/L (ref 133–144)
SOURCE: ABNORMAL
SP GR UR STRIP: >1.05 (ref 1–1.03)
UROBILINOGEN UR STRIP-MCNC: NORMAL MG/DL (ref 0–2)
WBC # BLD AUTO: 8.7 10E9/L (ref 4–11)
WBC #/AREA URNS AUTO: 0 /HPF (ref 0–2)

## 2017-12-20 PROCEDURE — 81001 URINALYSIS AUTO W/SCOPE: CPT | Performed by: EMERGENCY MEDICINE

## 2017-12-20 PROCEDURE — 25000128 H RX IP 250 OP 636: Performed by: INTERNAL MEDICINE

## 2017-12-20 PROCEDURE — 25000132 ZZH RX MED GY IP 250 OP 250 PS 637: Performed by: INTERNAL MEDICINE

## 2017-12-20 PROCEDURE — 99207 ZZC CDG-MDM COMPONENT: MEETS MODERATE - UP CODED: CPT | Performed by: INTERNAL MEDICINE

## 2017-12-20 PROCEDURE — 36415 COLL VENOUS BLD VENIPUNCTURE: CPT | Performed by: INTERNAL MEDICINE

## 2017-12-20 PROCEDURE — 80048 BASIC METABOLIC PNL TOTAL CA: CPT | Performed by: INTERNAL MEDICINE

## 2017-12-20 PROCEDURE — 85027 COMPLETE CBC AUTOMATED: CPT | Performed by: INTERNAL MEDICINE

## 2017-12-20 PROCEDURE — 12000000 ZZH R&B MED SURG/OB

## 2017-12-20 PROCEDURE — 99232 SBSQ HOSP IP/OBS MODERATE 35: CPT | Performed by: INTERNAL MEDICINE

## 2017-12-20 RX ORDER — AMOXICILLIN 250 MG
1 CAPSULE ORAL 2 TIMES DAILY PRN
Status: DISCONTINUED | OUTPATIENT
Start: 2017-12-20 | End: 2017-12-21 | Stop reason: HOSPADM

## 2017-12-20 RX ORDER — ACETAMINOPHEN 325 MG/1
650 TABLET ORAL EVERY 4 HOURS PRN
Status: DISCONTINUED | OUTPATIENT
Start: 2017-12-20 | End: 2017-12-21 | Stop reason: HOSPADM

## 2017-12-20 RX ORDER — BUPROPION HYDROCHLORIDE 300 MG/1
300 TABLET ORAL EVERY MORNING
Status: DISCONTINUED | OUTPATIENT
Start: 2017-12-20 | End: 2017-12-21 | Stop reason: HOSPADM

## 2017-12-20 RX ORDER — OXYCODONE HYDROCHLORIDE 5 MG/1
5-10 TABLET ORAL
Status: DISCONTINUED | OUTPATIENT
Start: 2017-12-20 | End: 2017-12-21 | Stop reason: HOSPADM

## 2017-12-20 RX ORDER — LORATADINE 10 MG/1
10 TABLET ORAL DAILY
Status: DISCONTINUED | OUTPATIENT
Start: 2017-12-20 | End: 2017-12-21 | Stop reason: HOSPADM

## 2017-12-20 RX ORDER — AMOXICILLIN 250 MG
2 CAPSULE ORAL 2 TIMES DAILY PRN
Status: DISCONTINUED | OUTPATIENT
Start: 2017-12-20 | End: 2017-12-21 | Stop reason: HOSPADM

## 2017-12-20 RX ORDER — SODIUM CHLORIDE 9 MG/ML
INJECTION, SOLUTION INTRAVENOUS CONTINUOUS
Status: DISCONTINUED | OUTPATIENT
Start: 2017-12-20 | End: 2017-12-21 | Stop reason: HOSPADM

## 2017-12-20 RX ORDER — PROCHLORPERAZINE 25 MG
25 SUPPOSITORY, RECTAL RECTAL EVERY 12 HOURS PRN
Status: DISCONTINUED | OUTPATIENT
Start: 2017-12-20 | End: 2017-12-21 | Stop reason: HOSPADM

## 2017-12-20 RX ORDER — PROCHLORPERAZINE MALEATE 5 MG
10 TABLET ORAL EVERY 6 HOURS PRN
Status: DISCONTINUED | OUTPATIENT
Start: 2017-12-20 | End: 2017-12-21 | Stop reason: HOSPADM

## 2017-12-20 RX ORDER — NALOXONE HYDROCHLORIDE 0.4 MG/ML
.1-.4 INJECTION, SOLUTION INTRAMUSCULAR; INTRAVENOUS; SUBCUTANEOUS
Status: DISCONTINUED | OUTPATIENT
Start: 2017-12-20 | End: 2017-12-21 | Stop reason: HOSPADM

## 2017-12-20 RX ORDER — IPRATROPIUM BROMIDE AND ALBUTEROL SULFATE 2.5; .5 MG/3ML; MG/3ML
3 SOLUTION RESPIRATORY (INHALATION) EVERY 4 HOURS PRN
Status: DISCONTINUED | OUTPATIENT
Start: 2017-12-20 | End: 2017-12-21 | Stop reason: HOSPADM

## 2017-12-20 RX ORDER — ONDANSETRON 4 MG/1
4 TABLET, ORALLY DISINTEGRATING ORAL EVERY 6 HOURS PRN
Status: DISCONTINUED | OUTPATIENT
Start: 2017-12-20 | End: 2017-12-21 | Stop reason: HOSPADM

## 2017-12-20 RX ORDER — HYDROMORPHONE HYDROCHLORIDE 1 MG/ML
.3-.5 INJECTION, SOLUTION INTRAMUSCULAR; INTRAVENOUS; SUBCUTANEOUS
Status: DISCONTINUED | OUTPATIENT
Start: 2017-12-20 | End: 2017-12-21 | Stop reason: HOSPADM

## 2017-12-20 RX ORDER — VENLAFAXINE HYDROCHLORIDE 75 MG/1
150 CAPSULE, EXTENDED RELEASE ORAL DAILY
Status: DISCONTINUED | OUTPATIENT
Start: 2017-12-20 | End: 2017-12-21 | Stop reason: HOSPADM

## 2017-12-20 RX ORDER — AMLODIPINE BESYLATE 5 MG/1
5 TABLET ORAL DAILY
Status: DISCONTINUED | OUTPATIENT
Start: 2017-12-20 | End: 2017-12-21 | Stop reason: HOSPADM

## 2017-12-20 RX ORDER — ONDANSETRON 2 MG/ML
4 INJECTION INTRAMUSCULAR; INTRAVENOUS EVERY 6 HOURS PRN
Status: DISCONTINUED | OUTPATIENT
Start: 2017-12-20 | End: 2017-12-21 | Stop reason: HOSPADM

## 2017-12-20 RX ADMIN — FLUTICASONE FUROATE AND VILANTEROL TRIFENATATE 1 PUFF: 200; 25 POWDER RESPIRATORY (INHALATION) at 08:25

## 2017-12-20 RX ADMIN — OXYCODONE HYDROCHLORIDE 5 MG: 5 TABLET ORAL at 17:28

## 2017-12-20 RX ADMIN — LORATADINE 10 MG: 10 TABLET ORAL at 08:26

## 2017-12-20 RX ADMIN — SODIUM CHLORIDE: 9 INJECTION, SOLUTION INTRAVENOUS at 09:44

## 2017-12-20 RX ADMIN — SODIUM CHLORIDE: 9 INJECTION, SOLUTION INTRAVENOUS at 00:41

## 2017-12-20 RX ADMIN — SODIUM CHLORIDE: 9 INJECTION, SOLUTION INTRAVENOUS at 19:36

## 2017-12-20 RX ADMIN — AMLODIPINE BESYLATE 5 MG: 5 TABLET ORAL at 08:26

## 2017-12-20 RX ADMIN — PIPERACILLIN SODIUM AND TAZOBACTAM SODIUM 3.38 G: 36; 4.5 INJECTION, POWDER, FOR SOLUTION INTRAVENOUS at 06:16

## 2017-12-20 RX ADMIN — PIPERACILLIN SODIUM AND TAZOBACTAM SODIUM 3.38 G: 36; 4.5 INJECTION, POWDER, FOR SOLUTION INTRAVENOUS at 12:14

## 2017-12-20 RX ADMIN — PIPERACILLIN SODIUM AND TAZOBACTAM SODIUM 3.38 G: 36; 4.5 INJECTION, POWDER, FOR SOLUTION INTRAVENOUS at 23:49

## 2017-12-20 RX ADMIN — OXYCODONE HYDROCHLORIDE 5 MG: 5 TABLET ORAL at 12:20

## 2017-12-20 RX ADMIN — PIPERACILLIN SODIUM AND TAZOBACTAM SODIUM 3.38 G: 36; 4.5 INJECTION, POWDER, FOR SOLUTION INTRAVENOUS at 17:28

## 2017-12-20 RX ADMIN — HYDROMORPHONE HYDROCHLORIDE 0.5 MG: 1 INJECTION, SOLUTION INTRAMUSCULAR; INTRAVENOUS; SUBCUTANEOUS at 06:12

## 2017-12-20 RX ADMIN — VENLAFAXINE HYDROCHLORIDE 150 MG: 75 CAPSULE, EXTENDED RELEASE ORAL at 08:26

## 2017-12-20 RX ADMIN — HYDROMORPHONE HYDROCHLORIDE 0.3 MG: 1 INJECTION, SOLUTION INTRAMUSCULAR; INTRAVENOUS; SUBCUTANEOUS at 00:38

## 2017-12-20 RX ADMIN — BUPROPION HYDROCHLORIDE 300 MG: 300 TABLET, FILM COATED, EXTENDED RELEASE ORAL at 08:26

## 2017-12-20 ASSESSMENT — ACTIVITIES OF DAILY LIVING (ADL)
COMMUNICATION: 0 - UNDERSTANDS/COMMUNICATES WITHOUT DIFFICULTY
NUMBER_OF_TIMES_PATIENT_HAS_FALLEN_WITHIN_LAST_SIX_MONTHS: 1
DRESS: 0-->INDEPENDENT
BATHING: 0 - INDEPENDENT
TOILETING: 0 - INDEPENDENT
AMBULATION: 0 - INDEPENDENT
SWALLOWING: 0 - SWALLOWS FOODS/LIQUIDS WITHOUT DIFFICULTY
TRANSFERRING: 0-->INDEPENDENT
CHANGE_IN_FUNCTIONAL_STATUS_SINCE_ONSET_OF_CURRENT_ILLNESS/INJURY: NO
DRESS: 0 - INDEPENDENT
TOILETING: 0-->INDEPENDENT
AMBULATION: 0-->INDEPENDENT
CURRENT_FUNCTIONAL_LEVEL_COMMENT: GOOD
BATHING: 0-->INDEPENDENT
FALL_HISTORY_WITHIN_LAST_SIX_MONTHS: YES
EATING: 0 - INDEPENDENT
SWALLOWING: 0-->SWALLOWS FOODS/LIQUIDS WITHOUT DIFFICULTY
TRANSFERRING: 0 - INDEPENDENT
RETIRED_EATING: 0-->INDEPENDENT
COGNITION: 0 - NO COGNITION ISSUES REPORTED
RETIRED_COMMUNICATION: 0-->UNDERSTANDS/COMMUNICATES WITHOUT DIFFICULTY

## 2017-12-20 NOTE — ED PROVIDER NOTES
"  History     Chief Complaint:  Abdominal pain    HPI   Romelia Crystal is a 52 year old male with a medical history of asthma, diverticulosis, hyperlipidemia, and hypertension who presents with left abdominal pain. Patient was at work today, he went to have a bowel movement and noticed diarrhea, as well as dull, left lower quadrant pain. The pain was constant until he took Aleve, which gave partial relief. Pain does not radiate to his penis or testicles. He states having some blood when wiping, but also notes having hemorrhoids so the scant blood is not unusual. Per wife and patient, patient had a screening colonoscopy approximately 18 months ago, which showed 2 pockets of diverticulosis. Patient denies vomiting and urinary symptoms. No history of abdominal surgeries, anticoagulant use, or kidney stones.  He states that brother has had a history of diverticulitis.     Allergies:  Hmg-Coa-R inhibitors  Lisinopril     Medications:    Deltasone  Crestor  Norvasc  Advair  Albuterol  Wellbutrin  Effexor    Past Medical History:    Allergic rhinitis  Eczema  Hypertension  Hyperlipidemia  Asthma  Depression    Past Surgical History:    Tonsillectomy  [place] teeth    Family History:    Lipids  Cancer    Social History:  Smoking status: Never smoker  Alcohol use: Yes, few beers on weekend   Marital Status:   [2]  Wife at bedside.   Works as .     Review of Systems   All other systems reviewed and are negative.      Physical Exam   Patient Vitals for the past 24 hrs:   BP Temp Temp src Pulse Resp SpO2 Height Weight   12/19/17 2200 - - - - - 93 % - -   12/19/17 2111 (!) 155/97 101.1  F (38.4  C) Oral 118 20 96 % 1.778 m (5' 10\") 113.4 kg (250 lb)      Physical Exam  General: uncomfortable appearing man sitting upright in room 27  HENT: mucous membranes moist   CV: mildly tachycardic rate, no murmur audible  Resp: clear throughout, normal effort, no crackles or wheezing  GI: abdomen soft but very " tender in LLQ with voluntary guarding, bowel sounds present, no discrete masses palpable  MSK: no bony tenderness, no CVAT  Skin: appropriately warm and dry  Neuro: alert, clear speech, oriented   Psych: normal mood and affect      Emergency Department Course   Imaging:  Radiographic findings were communicated with the patient who voiced understanding of the findings.  CT Abdomen Pelvis w Contrast  Acute diverticulitis descending colon without evidence for abscess  or perforation.      Laboratory:  CBC: WBC 11.2 (H) WNL (HGB 14.6, )    CMP: Glucose 111 (H) WNL (Creatinine 1.10)   Lactic acid: 1.1  Blood culture (site 1): Pending.  Blood culture (site 2): Pending.    Interventions:  2140: Morphine 4 mg IV  2140: Zofran 4 mg IV  2140: NS 1L IV Bolus   2328: Zosyn 3.375g 15 mL IV     Emergency Department Course:  2116: Lactic acid ordered.  Past medical records, nursing notes, and vitals reviewed.  2119: I performed an exam of the patient and obtained history, as documented above.   IV inserted and blood drawn.   The patient was sent for a CT abdomen pelvis while in the emergency department, findings above.   2236: I updated the patient and wife.  Discussed outpatient treatment versus hospitalization.  They agree with admission.   2248: I spoke with Dr. Kaminski of the hospitalist service who accepts to admit the patient for further care, monitoring, and treatment.       Impression & Plan    Medical Decision Making:  His presentation is consistent with acute diverticulitis that was confirmed on CT. He meets criteria for sepsis given his presenting fever and tachycardia, though he does not meet criteria for severe sepsis or shock. There are no signs of an immediate surgical complication such as perforation or abscess, though I do think he would benefit from hospitalization for IV antibiotics, IV analgesics, further resuscitation and close monitoring.  He and his wife at bedside agree with this plan and he was  accepted by the hospitalist service.      Diagnosis:    ICD-10-CM   1. Diverticulitis of colon K57.32   2. Sepsis, due to unspecified organism (H) A41.9     Disposition:  Admitted to the [facility]  12/19/2017    EMERGENCY DEPARTMENT  NAHUM Lana Beckwith, am serving as a scribe at 9:19 PM on 12/19/2017 to document services personally performed by Donn Keating MD based on my observations and the provider's statements to me.       Donn Keating MD  12/19/17 4097

## 2017-12-20 NOTE — H&P
[facility]    History and Physical  Hospitalist       Date of Admission:  12/19/2017    Assessment & Plan   Romelia Crystal is a 52 year old male with PMH of HTN, HLD, asthma, depression, who presents with diverticulitis. Septic on admission with fever, tachycardia, and leukocytosis. Treating with Zosyn.    Diverticulitis: Typical LLQ pain, CT confirms diverticulitis. This is the patient's first episode. Colonoscopy in 4/2016 did show diverticulosis. Pain controlled with morphine in ED. Notable evidence of sepsis on admission with fever, tachycardia, leukocytosis. Started on Zosyn in ED--will continue for now but if patient improves I suspect he can transition to Cipro + Flagyl quickly.  - Continue Zosyn  - NS @ 100ml/hr  - Clear liquid diet, ADAT  - Zofran and compazine for nausea  - Tylenol, oxycodone, dilaudid IV for pain control  - Briefly discussed dietary changes, will benefit from further discussion at discharge    Asthma: Patient reports poor asthma control over the past several weeks. Lungs clear to auscultation.  - Duonebs q4h PRN  - Cont Advair, Claritin    HTN: SBP < 160s here.  - Cont PTA amlodipine  Hx depression: No acute issues. Cont PTA bupropion, venlafaxine    DVT Prophylaxis: Pneumatic Compression Devices  Code Status: Full Code    Disposition: Expected discharge in  2 days    Harry Kaminski MD    Primary Care Physician   Sandra Guardado    Chief Complaint   LLQ abdominal pain    History is obtained from the patient  Wife Patsy at bedside    History of Present Illness   Romelia Crystal is a 52 year old male who presents with fever, abdominal pain. Patient notes onset of LLQ abdominal pain during a bowel movement yesterday. Afterwards felt fine. This morning, LLQ pain came back, and significantly worsened throughout the day, with chills, weakness. He did have a bowel movement at 10:30AM nonbloody. He denies nausea or vomiting. He denies chest pain, shortness of breath. No  dysuria. He has been getting over a viral illness as well.    Past Medical History    I have reviewed this patient's medical history and updated it with pertinent information if needed.   Past Medical History:   Diagnosis Date     Allergic rhinitis, mild     with cough     Eczema      HTN (hypertension), benign      Hyperlipidemia LDL goal < 130      Mild persistent asthma      Mild persistent asthma      Mild recurrent major depression (H) 6/12/2012    Followed by [facility] Dinero at [facility]       Past Surgical History   I have reviewed this patient's surgical history and updated it with pertinent information if needed.  Past Surgical History:   Procedure Laterality Date     TONSILLECTOMY       wisdom teeth         Prior to Admission Medications   Prior to Admission Medications   Prescriptions Last Dose Informant Patient Reported? Taking?   albuterol (PROAIR HFA/PROVENTIL HFA/VENTOLIN HFA) 108 (90 BASE) MCG/ACT Inhaler   No No   Sig: Inhale 2 puffs into the lungs every 6 hours   amLODIPine (NORVASC) 5 MG tablet   No No   Sig: Take 1 tablet (5 mg) by mouth daily   buPROPion (WELLBUTRIN XL) 300 MG 24 hr tablet   Yes No   Sig: Take 1 tablet (300 mg) by mouth every morning   fluticasone-salmeterol (ADVAIR) 250-50 MCG/DOSE diskus inhaler   No No   Sig: Inhale 1 puff into the lungs 2 times daily   loratadine (CLARITIN) 10 MG tablet   No No   Sig: Take 1 tablet (10 mg) by mouth daily   Patient taking differently: Take 10 mg by mouth as needed    predniSONE (DELTASONE) 20 MG tablet   No No   Sig: Take 1 tablet (20 mg) by mouth 2 times daily   rosuvastatin (CRESTOR) 5 MG tablet   No No   Sig: Take 1 tablet by mouth once daily   venlafaxine (EFFEXOR-XR) 75 MG 24 hr capsule   Yes No   Sig: Take 150 mg by mouth daily       Facility-Administered Medications: None     Allergies   Allergies   Allergen Reactions     Hmg-Coa-R Inhibitors Other (See Comments)     Some Statins Muscle aches      Lisinopril Cough       Social History   I  have reviewed this patient's social history and updated it with pertinent information if needed. Romelia Crystal  reports that he has never smoked. He has never used smokeless tobacco. He reports that he drinks alcohol. He reports that he does not use illicit drugs.  No recnet alcohol use.    Family History   I have reviewed this patient's family history and updated it with pertinent information if needed.   Family History   Problem Relation Age of Onset     Lipids Father      CANCER Father 50     lymphoma   Brother had iverticulitis.    Review of Systems   The 10 point Review of Systems is negative other than noted in the HPI or here.     Physical Exam   Temp: 101.1  F (38.4  C) Temp src: Oral BP: (!) 155/97 Pulse: 118   Resp: 20 SpO2: 93 %      Vital Signs with Ranges  Temp:  [101.1  F (38.4  C)] 101.1  F (38.4  C)  Pulse:  [118] 118  Resp:  [20] 20  BP: (155)/(97) 155/97  SpO2:  [93 %-96 %] 93 %  250 lbs 0 oz    Constitutional: Male tired appearing  Eyes: PERRL, nonicteric, normal ocular movements  HEENT: Normocephalic, atraumatic, oral mucosa moist  Respiratory: CTAB, no wheezing or crackles  Cardiovascular: RRR, normal S1/2, no m/r/g  GI: No organomegaly, normoactive bowel sounds, LLQ tenderness  Vascular: Palpable peripheral pulses in upper and lower extremities, no lower extremity pitting edema  Skin: No rashes or scars  Musculoskeletal: Normal strength in UE and LE, moves all extremities  Neurologic: A&Ox3  Psychiatric: Appropriate affect and mood    Data   Data reviewed today:  I personally reviewed CT.    Recent Labs  Lab 12/19/17  2130   WBC 11.2*   HGB 14.6   MCV 86         POTASSIUM 3.8   CHLORIDE 104   CO2 30   BUN 11   CR 1.10   ANIONGAP 4   YEIMY 8.9   *   ALBUMIN 3.9   PROTTOTAL 7.0   BILITOTAL 0.6   ALKPHOS 86   ALT 28   AST 14       Imaging:  Recent Results (from the past 24 hour(s))   CT Abdomen Pelvis w Contrast    Narrative    CT ABDOMEN PELVIS WITH CONTRAST 12/19/2017 10:18  PM    TECHNIQUE: Images from diaphragm to pubic symphysis 120 mL Isovue -370  Radiation dose for this scan was reduced using automated exposure  control, adjustment of the mA and/or kV according to patient size, or  iterative reconstruction technique.    HISTORY: 1 day LLQ pain, fever, diarrhea, h/o diverticulosis;     COMPARISON: None.    FINDINGS:   Abdomen and Pelvis: Prominent pericolonic stranding and bowel wall  thickening surrounding the descending colon near the level of the  iliac crests. Consistent with acute diverticulitis. No evidence for  abscess or free air. No other acute bowel abnormality. Normal  appendix. Lung bases: Minimal discoid atelectasis or fibrosis middle  lobe and left lung base. Normal-appearing liver, gallbladder, spleen,  pancreas, adrenal glands and kidneys. No periaortic or pelvic  adenopathy. No free fluid. No aggressive bone lesions. Prominent L1 to  left sided osteophyte.      Impression    IMPRESSION:   1. Acute diverticulitis descending colon without evidence for abscess  or perforation.                 NATALY HOYT MD

## 2017-12-20 NOTE — PLAN OF CARE
Problem: Patient Care Overview  Goal: Plan of Care/Patient Progress Review  Outcome: No Change  A&O x 4, pleasant and corporative. T 99.3. Other VSS on room air. Lung sounds clear. Bowel sounds normoactive. 0.9% Sodium chloride infusing at 100 ml/hr. On clear liquid diet. To be advanced as tolerated. Continues on IV Zosyn q6h. C/o pain to left lower abdomen. Was give IV Dilaudid with relief. Up with stand by assist. Slept well throughout the night. Urine to be collected for UA. Expected discharge per MD in 2 days. Continue to monitor.

## 2017-12-20 NOTE — PROGRESS NOTES
[facility]    Hospitalist Progress Note  Nabor Rogers MD    Assessment & Plan     52 year old male with PmHx of Hypertension, Hyperlipidemia, asthma, depression, who was admitted on 12/19/17 with left lower quadrant abdominal pain. Vitals on admission revealed, Temp 101.1F, /min, B.P. 155/97mmHg, O2 Sats 96% on RA. Work up done on 12/19/17 revealed, normal CMP. Lactic acid 1.1. CBC revealed, WBC 11.2. UA is negative. CT abd/pelvis on 12/19/17 revealed, acute diverticulitis descending colon, without evidence for abscess  or perforation.        1. Diverticulitis of the descending colon: abdominal pain is improving. Continue IV N/Saline and IV Zosyn. Continue full liquid diet. For IV dilaudid prn, tylenol prn and oxycodone IR prn. For antiemetics prn.      2. Mild persistent Asthma: asymptomatic. Continue Duonebs q4h PRN. Continue fluticasone-vilanterol inh qd. Continue loratadine.      3. Hypertension: continue amlodipine.     4. Depression: Continue bupropion and venlafaxine        DVT Prophylaxis: Pneumatic Compression Devices  Code Status: Full Code    Disposition: Expected discharge in 1 day.      Interval History   The pt's left lower quadrant abdominal pain is less today. He is tolerating a full liquid diet.    -Data reviewed today: I reviewed all new labs and imaging results over the last 24 hours. I personally reviewed no images or EKG's today.    Physical Exam   Temp: 98.3  F (36.8  C) Temp src: Oral BP: 126/81 Pulse: 86   Resp: 18 SpO2: 91 % O2 Device: None (Room air)    Vitals:    12/19/17 2111 12/20/17 0038   Weight: 113.4 kg (250 lb) 112.4 kg (247 lb 12.8 oz)     Vital Signs with Ranges  Temp:  [98.3  F (36.8  C)-101.1  F (38.4  C)] 98.3  F (36.8  C)  Pulse:  [] 86  Resp:  [18-20] 18  BP: (126-155)/(81-98) 126/81  SpO2:  [91 %-96 %] 91 %       Constitutional: Adult white male, awake, cooperative, no apparent distress, O2 Sats 91% on RA  Respiratory: BS vesicular  bilaterally, no crackles or wheezing  Cardiovascular: S1 and S2, reg, no murmur noted  GI: Soft, non-distended, LLQ tenderness+, no masses, BS present+  Skin/Integumen: No rashes  Extremities: No pedal edema    Medications     NaCl 100 mL/hr at 12/20/17 0944       amLODIPine  5 mg Oral Daily     buPROPion  300 mg Oral QAM     fluticasone-vilanterol  1 puff Inhalation Daily     loratadine  10 mg Oral Daily     venlafaxine  150 mg Oral Daily     piperacillin-tazobactam  3.375 g Intravenous Q6H     pneumococcal vaccine  0.5 mL Intramuscular Prior to discharge       Data     Recent Labs  Lab 12/20/17  0845 12/19/17  2130   WBC 8.7 11.2*   HGB 13.1* 14.6   MCV 88 86    223    138   POTASSIUM 3.9 3.8   CHLORIDE 106 104   CO2 30 30   BUN 10 11   CR 1.10 1.10   ANIONGAP 4 4   YEIMY 8.4* 8.9   * 111*   ALBUMIN  --  3.9   PROTTOTAL  --  7.0   BILITOTAL  --  0.6   ALKPHOS  --  86   ALT  --  28   AST  --  14       Recent Results (from the past 24 hour(s))   CT Abdomen Pelvis w Contrast    Narrative    CT ABDOMEN PELVIS WITH CONTRAST 12/19/2017 10:18 PM    TECHNIQUE: Images from diaphragm to pubic symphysis 120 mL Isovue -370  Radiation dose for this scan was reduced using automated exposure  control, adjustment of the mA and/or kV according to patient size, or  iterative reconstruction technique.    HISTORY: 1 day LLQ pain, fever, diarrhea, h/o diverticulosis;     COMPARISON: None.    FINDINGS:   Abdomen and Pelvis: Prominent pericolonic stranding and bowel wall  thickening surrounding the descending colon near the level of the  iliac crests. Consistent with acute diverticulitis. No evidence for  abscess or free air. No other acute bowel abnormality. Normal  appendix. Lung bases: Minimal discoid atelectasis or fibrosis middle  lobe and left lung base. Normal-appearing liver, gallbladder, spleen,  pancreas, adrenal glands and kidneys. No periaortic or pelvic  adenopathy. No free fluid. No aggressive bone  lesions. Prominent L1 to  left sided osteophyte.      Impression    IMPRESSION:   1. Acute diverticulitis descending colon without evidence for abscess  or perforation.                 NATALY HOYT MD

## 2017-12-20 NOTE — ED NOTES
"[facility]  ED Nurse Handoff Report    ED Chief complaint: Abdominal Pain (Left sided abdominal pain with fever of 101. Diarrhea with blood.)      ED Diagnosis:   Final diagnoses:   Diverticulitis of colon   Sepsis, due to unspecified organism (H)       Code Status: Full Code    Allergies:   Allergies   Allergen Reactions     Hmg-Coa-R Inhibitors Other (See Comments)     Some Statins Muscle aches      Lisinopril Cough       Activity level - Baseline/Home:  Independent    Activity Level - Current:   Stand with Assist     Needed?: No    Isolation: No  Infection: Not Applicable    Bariatric?: No    Vital Signs:   Vitals:    12/19/17 2111 12/19/17 2200   BP: (!) 155/97    Pulse: 118    Resp: 20    Temp: 101.1  F (38.4  C)    TempSrc: Oral    SpO2: 96% 93%   Weight: 113.4 kg (250 lb)    Height: 1.778 m (5' 10\")        Cardiac Rhythm: ,        Pain level: 0-10 Pain Scale: 2    Is this patient confused?: No    Patient Report: Initial Complaint: abd pain  Focused Assessment: 52 year old male with a medical history of asthma, diverticulosis, hyperlipidemia, and hypertension who presents with left abdominal pain. Patient was at work today, he went to have a bowel movement and noticed diarrhea, as well as dull, left lower quadrant pain. The pain was constant until he took Aleve, which gave partial relief. Pain does not radiate to his penis or testicles. He states having some blood when wiping, but also notes having hemorrhoids so the scant blood is not unusual. Per wife and patient, patient had a screening colonoscopy approximately 18 months ago, which showed 2 pockets of diverticulosis. Patient denies vomiting and urinary symptoms. No history of abdominal surgeries, anticoagulant use, or kidney stones.  He states that brother has had a history of diverticulitis.        Tests Performed: labs, ct  Abnormal Results:   Results for orders placed or performed during the hospital encounter of 12/19/17 " (from the past 24 hour(s))   CBC with platelets differential   Result Value Ref Range    WBC 11.2 (H) 4.0 - 11.0 10e9/L    RBC Count 4.84 4.4 - 5.9 10e12/L    Hemoglobin 14.6 13.3 - 17.7 g/dL    Hematocrit 41.4 40.0 - 53.0 %    MCV 86 78 - 100 fl    MCH 30.2 26.5 - 33.0 pg    MCHC 35.3 31.5 - 36.5 g/dL    RDW 13.1 10.0 - 15.0 %    Platelet Count 223 150 - 450 10e9/L    Diff Method Automated Method     % Neutrophils 76.2 %    % Lymphocytes 13.7 %    % Monocytes 8.1 %    % Eosinophils 1.4 %    % Basophils 0.4 %    % Immature Granulocytes 0.2 %    Nucleated RBCs 0 0 /100    Absolute Neutrophil 8.5 (H) 1.6 - 8.3 10e9/L    Absolute Lymphocytes 1.5 0.8 - 5.3 10e9/L    Absolute Monocytes 0.9 0.0 - 1.3 10e9/L    Absolute Eosinophils 0.2 0.0 - 0.7 10e9/L    Absolute Basophils 0.0 0.0 - 0.2 10e9/L    Abs Immature Granulocytes 0.0 0 - 0.4 10e9/L    Absolute Nucleated RBC 0.0    Comprehensive metabolic panel   Result Value Ref Range    Sodium 138 133 - 144 mmol/L    Potassium 3.8 3.4 - 5.3 mmol/L    Chloride 104 94 - 109 mmol/L    Carbon Dioxide 30 20 - 32 mmol/L    Anion Gap 4 3 - 14 mmol/L    Glucose 111 (H) 70 - 99 mg/dL    Urea Nitrogen 11 7 - 30 mg/dL    Creatinine 1.10 0.66 - 1.25 mg/dL    GFR Estimate 70 >60 mL/min/1.7m2    GFR Estimate If Black 84 >60 mL/min/1.7m2    Calcium 8.9 8.5 - 10.1 mg/dL    Bilirubin Total 0.6 0.2 - 1.3 mg/dL    Albumin 3.9 3.4 - 5.0 g/dL    Protein Total 7.0 6.8 - 8.8 g/dL    Alkaline Phosphatase 86 40 - 150 U/L    ALT 28 0 - 70 U/L    AST 14 0 - 45 U/L   Lactic acid   Result Value Ref Range    Lactic Acid 1.1 0.4 - 2.0 mmol/L   CT Abdomen Pelvis w Contrast    Narrative    CT ABDOMEN PELVIS WITH CONTRAST 12/19/2017 10:18 PM    TECHNIQUE: Images from diaphragm to pubic symphysis 120 mL Isovue -370  Radiation dose for this scan was reduced using automated exposure  control, adjustment of the mA and/or kV according to patient size, or  iterative reconstruction technique.    HISTORY: 1 day LLQ  pain, fever, diarrhea, h/o diverticulosis;     COMPARISON: None.    FINDINGS:   Abdomen and Pelvis: Prominent pericolonic stranding and bowel wall  thickening surrounding the descending colon near the level of the  iliac crests. Consistent with acute diverticulitis. No evidence for  abscess or free air. No other acute bowel abnormality. Normal  appendix. Lung bases: Minimal discoid atelectasis or fibrosis middle  lobe and left lung base. Normal-appearing liver, gallbladder, spleen,  pancreas, adrenal glands and kidneys. No periaortic or pelvic  adenopathy. No free fluid. No aggressive bone lesions. Prominent L1 to  left sided osteophyte.      Impression    IMPRESSION:   1. Acute diverticulitis descending colon without evidence for abscess  or perforation.                 NATALY HOYT MD       Treatments provided: zosyn, zofran, morphine    Family Comments: spouse    OBS brochure/video discussed/provided to patient: N/A    ED Medications:   Medications   morphine (PF) injection 4 mg (4 mg Intravenous Given 12/19/17 2140)   ondansetron (ZOFRAN) injection 4 mg (4 mg Intravenous Given 12/19/17 2140)   piperacillin-tazobactam (ZOSYN) 3.375 in 15 mL NS Premix Syringe (not administered)   0.9% sodium chloride BOLUS (1,000 mLs Intravenous New Bag 12/19/17 2140)   Saline Flush - CT (78 mLs Intravenous Given 12/19/17 2216)   iopamidol (ISOVUE-370) solution 120 mL (120 mLs Intravenous Given 12/19/17 2215)       Drips infusing?:  No      ED NURSE PHONE NUMBER: [id number]

## 2017-12-20 NOTE — PLAN OF CARE
Problem: Patient Care Overview  Goal: Plan of Care/Patient Progress Review  Outcome: Improving  Care Plan Summary Note: vss, alert x4, c/o HA due to lack of sleep, felt better after coffee intake. Tolerating clear, upgraded to full liquid. Lungs clear, on RA, +BS. PIV patent and infusing. Good intake and output. Continue with IV abx. IND in room. Stable, continue to monitor.     Oxycodone given x1 for left lower abd pain with relief. Tolerated full liquid for lunch.

## 2017-12-21 VITALS
DIASTOLIC BLOOD PRESSURE: 81 MMHG | OXYGEN SATURATION: 96 % | HEIGHT: 70 IN | WEIGHT: 247.8 LBS | BODY MASS INDEX: 35.48 KG/M2 | SYSTOLIC BLOOD PRESSURE: 120 MMHG | TEMPERATURE: 98.1 F | HEART RATE: 71 BPM | RESPIRATION RATE: 16 BRPM

## 2017-12-21 PROCEDURE — 99238 HOSP IP/OBS DSCHRG MGMT 30/<: CPT | Performed by: INTERNAL MEDICINE

## 2017-12-21 PROCEDURE — 90732 PPSV23 VACC 2 YRS+ SUBQ/IM: CPT | Performed by: INTERNAL MEDICINE

## 2017-12-21 PROCEDURE — 25000132 ZZH RX MED GY IP 250 OP 250 PS 637: Performed by: INTERNAL MEDICINE

## 2017-12-21 PROCEDURE — 25000128 H RX IP 250 OP 636: Performed by: INTERNAL MEDICINE

## 2017-12-21 RX ORDER — METRONIDAZOLE 500 MG/1
500 TABLET ORAL 3 TIMES DAILY
Qty: 21 TABLET | Refills: 0 | Status: SHIPPED | OUTPATIENT
Start: 2017-12-21 | End: 2017-12-28

## 2017-12-21 RX ORDER — CIPROFLOXACIN 500 MG/1
500 TABLET, FILM COATED ORAL 2 TIMES DAILY
Qty: 14 TABLET | Refills: 0 | Status: SHIPPED | OUTPATIENT
Start: 2017-12-21 | End: 2017-12-28

## 2017-12-21 RX ADMIN — AMLODIPINE BESYLATE 5 MG: 5 TABLET ORAL at 08:37

## 2017-12-21 RX ADMIN — PIPERACILLIN SODIUM AND TAZOBACTAM SODIUM 3.38 G: 36; 4.5 INJECTION, POWDER, FOR SOLUTION INTRAVENOUS at 06:34

## 2017-12-21 RX ADMIN — PIPERACILLIN SODIUM AND TAZOBACTAM SODIUM 3.38 G: 36; 4.5 INJECTION, POWDER, FOR SOLUTION INTRAVENOUS at 12:00

## 2017-12-21 RX ADMIN — PNEUMOCOCCAL VACCINE POLYVALENT 0.5 ML
25; 25; 25; 25; 25; 25; 25; 25; 25; 25; 25; 25; 25; 25; 25; 25; 25; 25; 25; 25; 25; 25; 25 INJECTION, SOLUTION INTRAMUSCULAR; SUBCUTANEOUS at 08:37

## 2017-12-21 RX ADMIN — BUPROPION HYDROCHLORIDE 300 MG: 300 TABLET, FILM COATED, EXTENDED RELEASE ORAL at 08:36

## 2017-12-21 RX ADMIN — FLUTICASONE FUROATE AND VILANTEROL TRIFENATATE 1 PUFF: 200; 25 POWDER RESPIRATORY (INHALATION) at 08:40

## 2017-12-21 RX ADMIN — VENLAFAXINE HYDROCHLORIDE 150 MG: 75 CAPSULE, EXTENDED RELEASE ORAL at 08:36

## 2017-12-21 RX ADMIN — SODIUM CHLORIDE: 9 INJECTION, SOLUTION INTRAVENOUS at 05:10

## 2017-12-21 RX ADMIN — LORATADINE 10 MG: 10 TABLET ORAL at 08:37

## 2017-12-21 NOTE — PLAN OF CARE
Problem: Patient Care Overview  Goal: Plan of Care/Patient Progress Review  Outcome: No Change   A&O x 4, pleasant and corporative. VSS on room air. Lung sounds clear. Bowel sounds normoactive. Denied pain. 0.9% Sodium chloride infusing at 100 ml/hr. On full liquid diet. To be advanced as tolerated. Continues on IV Zosyn q6h. Up independently to the bathroom. Voids adequately. Slept well throughout the night. For possible discharge today. Continue to monitor.

## 2017-12-21 NOTE — DISCHARGE SUMMARY
[facility]    Discharge Summary  Hospitalist  Nabor Rogers MD    Date of Admission:  12/19/2017  Date of Discharge:  12/21/2017  Discharging Provider: Nabor Rogers    Discharge Diagnoses   1. Diverticulitis of the descending colon.    History of Present Illness   Romelia Crystal is an 52 year old male, who presented with left lower quadrant abdominal pain.    Hospital Course   52 year old male, with PmHx of Hypertension, Hyperlipidemia, asthma, depression, who was admitted on 12/19/17, with left lower quadrant abdominal pain. Vitals on admission revealed, Temp 101.1F, /min, B.P. 155/97mmHg, O2 Sats 96% on RA. Work up done on 12/19/17 revealed, normal CMP. Lactic acid 1.1. CBC revealed, WBC 11.2. UA is negative. CT abd/pelvis on 12/19/17 revealed, acute diverticulitis descending colon, without evidence for abscess or perforation.        The patient was admitted to medical floor and was commenced on clear liquid diet,   IV N/Saline and IV Zosyn. His left lower quadrant abdominal pain reduced significantly during his admission and his diet was advanced to solid diet, which he tolerated. The patient was discharged home with 1 week course of oral Ciprofloxacin and Flagyl. He is to eat a low residue diet for 2 weeks. He is to follow up with is Primary Care Provider in 2 weeks.    Significant Results and Procedures   See above.    Pending Results   None.  Unresulted Labs Ordered in the Past 30 Days of this Admission     Date and Time Order Name Status Description    12/19/2017 2116 Blood culture Preliminary     12/19/2017 2116 Blood culture Preliminary           Code Status   Full Code       Primary Care Physician   Sandra Guardado    Physical Exam   Temp: 98.1  F (36.7  C) Temp src: Oral BP: 120/81 Pulse: 71   Resp: 16 SpO2: 96 % O2 Device: None (Room air)    Vitals:    12/19/17 2111 12/20/17 0038   Weight: 113.4 kg (250 lb) 112.4 kg (247 lb 12.8 oz)     Constitutional:   Adult white male, awake, cooperative, no apparent distress, O2 Sats 96% on RA  Respiratory: BS vesicular bilaterally, no crackles or wheezing  Cardiovascular: S1 and S2, reg, no murmur noted  GI: Soft, non-distended, non-tender, no masses, BS present+  Skin/Integumen: No rashes  Extremities: No pedal edema    Discharge Disposition   Discharged to home  Condition at discharge: Stable    Consultations This Hospital Stay   None    Time Spent on this Encounter   I, Nabor Rogers, personally saw the patient today and spent less than or equal to 30 minutes discharging this patient.    Discharge Orders     Reason for your hospital stay   Diverticulitis of the descending colon.     Follow-up and recommended labs and tests    Follow up with primary care provider, Sandra Guardado, within 2 weeks, for hospital follow- up. No follow up labs or test are needed.     Activity   Your activity upon discharge: activity as tolerated.     Full Code     Diet   Follow this diet upon discharge: Low residue diet.       Discharge Medications   Current Discharge Medication List      START taking these medications    Details   ciprofloxacin (CIPRO) 500 MG tablet Take 1 tablet (500 mg) by mouth 2 times daily for 7 days  Qty: 14 tablet, Refills: 0    Associated Diagnoses: Diverticulitis of colon      metroNIDAZOLE (FLAGYL) 500 MG tablet Take 1 tablet (500 mg) by mouth 3 times daily for 7 days  Qty: 21 tablet, Refills: 0    Associated Diagnoses: Diverticulitis of colon         CONTINUE these medications which have NOT CHANGED    Details   albuterol (2.5 MG/3ML) 0.083% neb solution Take 1 vial by nebulization every 6 hours as needed for shortness of breath / dyspnea or wheezing      rosuvastatin (CRESTOR) 5 MG tablet Take 1 tablet by mouth once daily  Qty: 90 tablet, Refills: 0    Associated Diagnoses: Hyperlipidemia with target LDL less than 130      amLODIPine (NORVASC) 5 MG tablet Take 1 tablet (5 mg) by mouth daily  Qty: 90  tablet, Refills: 3    Associated Diagnoses: Essential hypertension, benign      fluticasone-salmeterol (ADVAIR) 250-50 MCG/DOSE diskus inhaler Inhale 1 puff into the lungs 2 times daily  Qty: 3 Inhaler, Refills: 3    Associated Diagnoses: Mild persistent asthma, uncomplicated      albuterol (PROAIR HFA/PROVENTIL HFA/VENTOLIN HFA) 108 (90 BASE) MCG/ACT Inhaler Inhale 2 puffs into the lungs every 6 hours  Qty: 1 Inhaler, Refills: 6    Associated Diagnoses: Mild persistent asthma, uncomplicated      buPROPion (WELLBUTRIN XL) 300 MG 24 hr tablet Take 1 tablet (300 mg) by mouth every morning  Qty: 90 tablet, Refills: 1    Associated Diagnoses: Mild recurrent major depression (H)      venlafaxine (EFFEXOR-XR) 75 MG 24 hr capsule Take 150 mg by mouth daily       predniSONE (DELTASONE) 20 MG tablet Take 1 tablet (20 mg) by mouth 2 times daily  Qty: 10 tablet, Refills: 0    Associated Diagnoses: Moderate persistent asthma with exacerbation      loratadine (CLARITIN) 10 MG tablet Take 1 tablet (10 mg) by mouth daily  Qty: 30 tablet, Refills: 0    Associated Diagnoses: Allergic rhinitis, mild           Allergies   Allergies   Allergen Reactions     Hmg-Coa-R Inhibitors Other (See Comments)     Some Statins Muscle aches      Lisinopril Cough     Data   Most Recent 3 CBC's:  Recent Labs   Lab Test  12/20/17   0845  12/19/17   2130  06/10/15   1604   WBC  8.7  11.2*  5.8   HGB  13.1*  14.6  14.6   MCV  88  86  89   PLT  184  223  245      Most Recent 3 BMP's:  Recent Labs   Lab Test  12/20/17   0845  12/19/17 2130  07/26/17   0808   NA  140  138  142   POTASSIUM  3.9  3.8  4.1   CHLORIDE  106  104  107   CO2  30  30  32   BUN  10  11  9   CR  1.10  1.10  1.00   ANIONGAP  4  4  3   YEIMY  8.4*  8.9  8.9   GLC  109*  111*  92     Most Recent 2 LFT's:  Recent Labs   Lab Test  12/19/17 2130 07/26/17   0808   AST  14  20   ALT  28  31   ALKPHOS  86  65   BILITOTAL  0.6  0.5     Most Recent INR's and Anticoagulation Dosing  History:  Anticoagulation Dose History     There is no flowsheet data to display.        Most Recent 3 Troponin's:  Recent Labs   Lab Test  10/29/10   1918   TROPI  <0.012     Most Recent Cholesterol Panel:  Recent Labs   Lab Test  07/26/17   0808   CHOL  317*   LDL  230*   HDL  48   TRIG  196*     Most Recent 6 Bacteria Isolates From Any Culture (See EPIC Reports for Culture Details):  Recent Labs   Lab Test  12/19/17   2155  12/19/17   2130  08/05/14   1548   CULT  No growth after 1 day  No growth after 2 days  No Beta Streptococcus isolated     Most Recent TSH, T4 and A1c Labs:No lab results found.    Results for orders placed or performed during the hospital encounter of 12/19/17   CT Abdomen Pelvis w Contrast    Narrative    CT ABDOMEN PELVIS WITH CONTRAST 12/19/2017 10:18 PM    TECHNIQUE: Images from diaphragm to pubic symphysis 120 mL Isovue -370  Radiation dose for this scan was reduced using automated exposure  control, adjustment of the mA and/or kV according to patient size, or  iterative reconstruction technique.    HISTORY: 1 day LLQ pain, fever, diarrhea, h/o diverticulosis;     COMPARISON: None.    FINDINGS:   Abdomen and Pelvis: Prominent pericolonic stranding and bowel wall  thickening surrounding the descending colon near the level of the  iliac crests. Consistent with acute diverticulitis. No evidence for  abscess or free air. No other acute bowel abnormality. Normal  appendix. Lung bases: Minimal discoid atelectasis or fibrosis middle  lobe and left lung base. Normal-appearing liver, gallbladder, spleen,  pancreas, adrenal glands and kidneys. No periaortic or pelvic  adenopathy. No free fluid. No aggressive bone lesions. Prominent L1 to  left sided osteophyte.      Impression    IMPRESSION:   1. Acute diverticulitis descending colon without evidence for abscess  or perforation.                 NATALY HOYT MD

## 2017-12-21 NOTE — PLAN OF CARE
Problem: Patient Care Overview  Goal: Plan of Care/Patient Progress Review  Outcome: Improving  Pt A/O x4. VS stable on RA. Up ind, steady. Pt rated pain in L lower abdomen 5, PRN oxy administered with relief of pain. Full liquid diet, tolerating. Voiding. IV NS infusing 100ml/hr. IV abx. Blood cultures pending. Anticipated D/C per MD tomorrow. Nursing continue to monitor.

## 2017-12-21 NOTE — PLAN OF CARE
Problem: Patient Care Overview  Goal: Plan of Care/Patient Progress Review  Outcome: Adequate for Discharge Date Met: 12/21/17  Care Plan Summary Note: vss, alert x4, denied pain. Tolerating regular diet. Voiding well. Plan to d.c to home today with wife. +BS, lungs clear. Pass gas, no BM yet. Instructed to follow low finer diet while diverticulitst is healing, then follow high fiber diet and start exercising regularly.       D/c to home with wife at 1430 in stable condition.

## 2017-12-25 LAB
BACTERIA SPEC CULT: NO GROWTH
Lab: NORMAL
SPECIMEN SOURCE: NORMAL

## 2017-12-26 LAB
BACTERIA SPEC CULT: NO GROWTH
Lab: NORMAL
SPECIMEN SOURCE: NORMAL

## 2018-01-10 ENCOUNTER — OFFICE VISIT (OUTPATIENT)
Dept: FAMILY MEDICINE | Facility: CLINIC | Age: 53
End: 2018-01-10
Payer: COMMERCIAL

## 2018-01-10 VITALS
HEIGHT: 70 IN | WEIGHT: 245.5 LBS | DIASTOLIC BLOOD PRESSURE: 89 MMHG | TEMPERATURE: 97.6 F | SYSTOLIC BLOOD PRESSURE: 134 MMHG | OXYGEN SATURATION: 96 % | BODY MASS INDEX: 35.15 KG/M2 | HEART RATE: 83 BPM

## 2018-01-10 DIAGNOSIS — K57.32 DIVERTICULITIS OF COLON: Primary | ICD-10-CM

## 2018-01-10 DIAGNOSIS — G47.33 OSA (OBSTRUCTIVE SLEEP APNEA): ICD-10-CM

## 2018-01-10 PROCEDURE — 99214 OFFICE O/P EST MOD 30 MIN: CPT | Performed by: PHYSICIAN ASSISTANT

## 2018-01-10 ASSESSMENT — PATIENT HEALTH QUESTIONNAIRE - PHQ9: SUM OF ALL RESPONSES TO PHQ QUESTIONS 1-9: 1

## 2018-01-10 NOTE — NURSING NOTE
"Chief Complaint   Patient presents with     Hospital F/U       Initial /89 (BP Location: Right arm, Patient Position: Chair, Cuff Size: Adult Large)  Pulse 83  Temp 97.6  F (36.4  C) (Oral)  Ht 5' 10\" (1.778 m)  Wt 245 lb 8 oz (111.4 kg)  SpO2 96%  BMI 35.23 kg/m2 Estimated body mass index is 35.23 kg/(m^2) as calculated from the following:    Height as of this encounter: 5' 10\" (1.778 m).    Weight as of this encounter: 245 lb 8 oz (111.4 kg).  Medication Reconciliation: complete.  Madeleine Murphy CMA    "

## 2018-01-10 NOTE — PROGRESS NOTES
SUBJECTIVE:   Romelia Crystal is a 52 year old male who presents to clinic today for the following health issues:      Pt had CT proven diverticulitis and was tx with antibiotics (Cipro and flagyl)  He finished these 5 days ago  Diarrhea subsided 2 days ago (would have cramping and around 5 stools per day that were soft)  This was his first episode of diverticulitis.  BMs back to normal now  He is back to a more regular diet; but still pretty bland/ low residue    Pt has hx of ADALGISA and used to wear a CPAP (maybe about 15 years ago)  Cpap didn't work for him when he had nasal polyps  He has daytime sleepiness and terrible snoring.    Hospital Follow-up Visit:    Hospital/Nursing Home/IP Rehab Facility: [facility]  Date of Admission: 12/19/17  Date of Discharge: 12/21/17  Reason(s) for Admission: diverticulitis of colon            Problems taking medications regularly:  None       Medication changes since discharge: None       Problems adhering to non-medication therapy:  None    Summary of hospitalization:  [facility] discharge summary reviewed  Diagnostic Tests/Treatments reviewed.  Follow up needed: none  Other Healthcare Providers Involved in Patient s Care:         None  Update since discharge: improved.     Post Discharge Medication Reconciliation: discharge medications reconciled and changed, per note/orders (see AVS).  Plan of care communicated with patient     Coding guidelines for this visit:  Type of Medical   Decision Making Face-to-Face Visit       within 7 Days of discharge Face-to-Face Visit        within 14 days of discharge   Moderate Complexity [id number]   High Complexity [id number]          Past Medical History:   Diagnosis Date     Allergic rhinitis, mild     with cough     Eczema      HTN (hypertension), benign      Hyperlipidemia LDL goal < 130      Mild persistent asthma      Mild persistent asthma      Mild recurrent major depression (H) 6/12/2012    Followed by [facility]     Past Surgical History:   Procedure Laterality Date     TONSILLECTOMY       wisdom teeth       Social History   Substance Use Topics     Smoking status: Never Smoker     Smokeless tobacco: Never Used     Alcohol use 0.0 oz/week     0 Standard drinks or equivalent per week      Comment: few beers on weekend     Current Outpatient Prescriptions   Medication Sig Dispense Refill     albuterol (2.5 MG/3ML) 0.083% neb solution Take 1 vial by nebulization every 6 hours as needed for shortness of breath / dyspnea or wheezing       rosuvastatin (CRESTOR) 5 MG tablet Take 1 tablet by mouth once daily 90 tablet 0     amLODIPine (NORVASC) 5 MG tablet Take 1 tablet (5 mg) by mouth daily 90 tablet 3     fluticasone-salmeterol (ADVAIR) 250-50 MCG/DOSE diskus inhaler Inhale 1 puff into the lungs 2 times daily 3 Inhaler 3     albuterol (PROAIR HFA/PROVENTIL HFA/VENTOLIN HFA) 108 (90 BASE) MCG/ACT Inhaler Inhale 2 puffs into the lungs every 6 hours (Patient taking differently: Inhale 2 puffs into the lungs every 6 hours as needed ) 1 Inhaler 6     loratadine (CLARITIN) 10 MG tablet Take 1 tablet (10 mg) by mouth daily (Patient taking differently: Take 10 mg by mouth as needed ) 30 tablet 0     buPROPion (WELLBUTRIN XL) 300 MG 24 hr tablet Take 1 tablet (300 mg) by mouth every morning 90 tablet 1     venlafaxine (EFFEXOR-XR) 75 MG 24 hr capsule Take 150 mg by mouth daily        Allergies   Allergen Reactions     Hmg-Coa-R Inhibitors Other (See Comments)     Some Statins Muscle aches      Lisinopril Cough     FAMILY HISTORY NOTED AND REVIEWED      PHYSICAL EXAM:    /89 (BP Location: Right arm, Patient Position: Chair, Cuff Size: Adult Large)  Pulse 83  Temp 97.6  F (36.4  C) (Oral)  Ht 5' 10\" (1.778 m)  Wt 245 lb 8 oz (111.4 kg)  SpO2 96%  BMI 35.23 kg/m2    Patient appears non toxic  Heart; RRR without m/r/g.  Abd: soft, slightly tender in LLQ, no masses, no guarding or rebound. Normal BS.  Psych: approp " affect and mood    Assessment and Plan:     (K57.32) Diverticulitis of colon  (primary encounter diagnosis)  Comment: he completed 10 days of antibiotics and 90% better, but still some tenderness on exam  Plan: Advised he cont low residue diet and plenty of fluids a few more days and if abd pain worsens or persists call me right away.    (G47.33) ADALGISA (obstructive sleep apnea)  Comment: He has snoring and daytime sleepiness.  Was on Cpap years ago and didn't tolerate it, but heard there are other masks to choose from now.  Plan: SLEEP EVALUATION & MANAGEMENT REFERRAL - [facility]         [phone]  (Age 18 and up)              Sandra Guardado PA-C

## 2018-01-10 NOTE — MR AVS SNAPSHOT
After Visit Summary   1/10/2018    Romelia Crystal    MRN: [id number]           Patient Information     Date Of Birth          1965        Visit Information        Provider Department      1/10/2018 4:00 PM Sandra Guardado PA-C [facility]        Today's Diagnoses     Diverticulitis of colon    -  1    ADALGISA (obstructive sleep apnea)           Follow-ups after your visit        Additional Services     SLEEP EVALUATION & MANAGEMENT REFERRAL - [facility] [phone]  (Age 18 and up)       Please be aware that coverage of these services is subject to the terms and limitations of your health insurance plan.  Call member services at your health plan with any benefit or coverage questions.      Please bring the following to your appointment:    >>   List of current medications   >>   This referral request   >>   Any documents/labs given to you for this referral                      Future tests that were ordered for you today     Open Future Orders        Priority Expected Expires Ordered    SLEEP EVALUATION & MANAGEMENT REFERRAL - [facility] [phone]  (Age 18 and up) Routine  1/10/2019 1/10/2018            Who to contact     If you have questions or need follow up information about today's clinic visit or your schedule please contact [facility] directly at [phone].  Normal or non-critical lab and imaging results will be communicated to you by MyChart, letter or phone within 4 business days after the clinic has received the results. If you do not hear from us within 7 days, please contact the clinic through MyChart or phone. If you have a critical or abnormal lab result, we will notify you by phone as soon as possible.  Submit refill requests through [employer] or call your pharmacy and they will forward the refill request to us. Please allow 3 business days for your refill to be completed.          Additional  "Information About Your Visit        MyChart Information     [facility] [employer] gives you secure access to your electronic health record. If you see a primary care provider, you can also send messages to your care team and make appointments. If you have questions, please call your primary care clinic.  If you do not have a primary care provider, please call [phone] and they will assist you.        Care EveryWhere ID     This is your Care EveryWhere ID. This could be used by other organizations to access your [place] medical records  [id number]        Your Vitals Were     Pulse Temperature Height Pulse Oximetry BMI (Body Mass Index)       83 97.6  F (36.4  C) (Oral) 5' 10\" (1.778 m) 96% 35.23 kg/m2        Blood Pressure from Last 3 Encounters:   01/10/18 134/89   12/21/17 120/81   12/18/17 117/81    Weight from Last 3 Encounters:   01/10/18 245 lb 8 oz (111.4 kg)   12/20/17 247 lb 12.8 oz (112.4 kg)   12/18/17 247 lb (112 kg)                 Today's Medication Changes          These changes are accurate as of: 1/10/18  4:16 PM.  If you have any questions, ask your nurse or doctor.               These medicines have changed or have updated prescriptions.        Dose/Directions    * albuterol (2.5 MG/3ML) 0.083% neb solution   This may have changed:  Another medication with the same name was changed. Make sure you understand how and when to take each.        Dose:  1 vial   Take 1 vial by nebulization every 6 hours as needed for shortness of breath / dyspnea or wheezing   Refills:  0       * albuterol 108 (90 BASE) MCG/ACT Inhaler   Commonly known as:  PROAIR HFA/PROVENTIL HFA/VENTOLIN HFA   This may have changed:    - when to take this  - reasons to take this   Used for:  Mild persistent asthma, uncomplicated   Changed by:  Sandra Guardado PA-C        Dose:  2 puff   Inhale 2 puffs into the lungs every 6 hours   Quantity:  1 Inhaler   Refills:  6       loratadine 10 MG tablet   Commonly known as:  CLARITIN   This may " have changed:    - when to take this  - reasons to take this   Used for:  Allergic rhinitis, mild        Dose:  10 mg   Take 1 tablet (10 mg) by mouth daily   Quantity:  30 tablet   Refills:  0       * Notice:  This list has 2 medication(s) that are the same as other medications prescribed for you. Read the directions carefully, and ask your doctor or other care provider to review them with you.      Stop taking these medicines if you haven't already. Please contact your care team if you have questions.     predniSONE 20 MG tablet   Commonly known as:  DELTASONE   Stopped by:  Sandra Guardado PA-C                    Primary Care Provider Office Phone # Fax #    Sandra Guardado PA-C [phone] [address] [address]        Equal Access to Services     SANDEEP GRANADOS : Amanda reyeso Sorobert, waaxda luqadaha, qaybta kaalmada adeanaidyakyle, herber thompson . So [facility] [phone].    ATENCIÓN: Si habla español, tiene a salgado disposición servicios gratuitos de asistencia lingüística. Llame al [phone].    We comply with applicable federal civil rights laws and [place] laws. We do not discriminate on the basis of race, color, national origin, age, disability, sex, sexual orientation, or gender identity.            Thank you!     Thank you for choosing [facility]  for your care. Our goal is always to provide you with excellent care. Hearing back from our patients is one way we can continue to improve our services. Please take a few minutes to complete the written survey that you may receive in the mail after your visit with us. Thank you!             Your Updated Medication List - Protect others around you: Learn how to safely use, store and throw away your medicines at www.disposemymeds.org.          This list is accurate as of: 1/10/18  4:16 PM.  Always use your most recent med list.                   Brand Name Dispense Instructions for  use Diagnosis    * albuterol (2.5 MG/3ML) 0.083% neb solution      Take 1 vial by nebulization every 6 hours as needed for shortness of breath / dyspnea or wheezing        * albuterol 108 (90 BASE) MCG/ACT Inhaler    PROAIR HFA/PROVENTIL HFA/VENTOLIN HFA    1 Inhaler    Inhale 2 puffs into the lungs every 6 hours    Mild persistent asthma, uncomplicated       amLODIPine 5 MG tablet    NORVASC    90 tablet    Take 1 tablet (5 mg) by mouth daily    Essential hypertension, benign       buPROPion 300 MG 24 hr tablet    WELLBUTRIN XL    90 tablet    Take 1 tablet (300 mg) by mouth every morning    Mild recurrent major depression (H)       fluticasone-salmeterol 250-50 MCG/DOSE diskus inhaler    ADVAIR    3 Inhaler    Inhale 1 puff into the lungs 2 times daily    Mild persistent asthma, uncomplicated       loratadine 10 MG tablet    CLARITIN    30 tablet    Take 1 tablet (10 mg) by mouth daily    Allergic rhinitis, mild       rosuvastatin 5 MG tablet    CRESTOR    90 tablet    Take 1 tablet by mouth once daily    Hyperlipidemia with target LDL less than 130       venlafaxine 75 MG 24 hr capsule    EFFEXOR-XR     Take 150 mg by mouth daily        * Notice:  This list has 2 medication(s) that are the same as other medications prescribed for you. Read the directions carefully, and ask your doctor or other care provider to review them with you.

## 2018-01-11 ASSESSMENT — ASTHMA QUESTIONNAIRES: ACT_TOTALSCORE: 20

## 2018-01-25 ENCOUNTER — OFFICE VISIT (OUTPATIENT)
Dept: SLEEP MEDICINE | Facility: CLINIC | Age: 53
End: 2018-01-25
Attending: PHYSICIAN ASSISTANT
Payer: COMMERCIAL

## 2018-01-25 VITALS
BODY MASS INDEX: 35.36 KG/M2 | RESPIRATION RATE: 16 BRPM | WEIGHT: 247 LBS | OXYGEN SATURATION: 96 % | SYSTOLIC BLOOD PRESSURE: 129 MMHG | HEART RATE: 84 BPM | DIASTOLIC BLOOD PRESSURE: 85 MMHG | HEIGHT: 70 IN

## 2018-01-25 DIAGNOSIS — G47.33 OSA (OBSTRUCTIVE SLEEP APNEA): ICD-10-CM

## 2018-01-25 PROCEDURE — 99204 OFFICE O/P NEW MOD 45 MIN: CPT | Performed by: PHYSICIAN ASSISTANT

## 2018-01-25 NOTE — PATIENT INSTRUCTIONS
"MY TREATMENT INFORMATION FOR SLEEP APNEA-  Romelia Crystal    DOCTOR : Bennett Ezra Goltz, PA-C  SLEEP CENTER : [facility]     MY CONTACT NUMBER: [phone]    Am I having a sleep study at a sleep center?  Make sure you have an appointment for the study before you leave!    Am I having a home sleep study?  Watch this video:  https://www.CommScope.com/watch?v=CteI_GhyP9g&list=[email or web address]_[email or web address]    Frequently asked questions:  1. What is Obstructive Sleep Apnea (ADALGISA)? ADALGISA is the most common type of sleep apnea. Apnea means, \"without breath.\"  Apnea is most often caused by narrowing or collapse of the upper airway as muscles relax during sleep.   Almost everyone has occasional apneas. Most people with sleep apnea have had brief interruptions at night frequently for many years.  The severity of sleep apnea is related to how frequent and severe the events are.   2. What are the consequences of ADALGISA? Symptoms include: feeling sleepy during the day, snoring loudly, gasping or stopping of breathing, trouble sleeping, and occasionally morning headaches or heartburn at night.  Sleepiness can be serious and even increase the risk of falling asleep while driving. Other health consequences may include development of high blood pressure and other cardiovascular disease in persons who are susceptible. Untreated ADALGISA  can contribute to heart disease, stroke and diabetes.   3. What are the treatment options? In most situations, sleep apnea is a lifelong disease that must be managed with daily therapy. Medications are not effective for sleep apnea and surgery is generally not considered until other therapies have been tried. Your treatment is your choice . Continuous Positive Airway (CPAP) works right away and is the therapy that is effective in nearly everyone. An oral device to hold your jaw forward is usually the next most reliable option. Other options include postioning devices (to keep you off your back), weight loss, " and surgery including a tongue pacing device. There is more detail about some of these options below.    Important tips for using CPAP and similar devices   Know your equipment:  CPAP is continuous positive airway pressure that prevents obstructive sleep apnea by keeping the throat from collapsing while you are sleeping. In most cases, the device is  smart  and can slowly self-adjusts if your throat collapses and keeps a record every day of how well you are treated-this information is available to you and your care team.  BPAP is bilevel positive airway pressure that keeps your throat open and also assists each breath with a pressure boost to maintain adequate breathing.  Special kinds of BPAP are used in patients who have inadequate breathing from lung or heart disease. In most cases, the device is  smart  and can slowly self-adjusts to assist breathing. Like CPAP, the device keeps a record of how well you are treated.  Your mask is your connection to the device. You get to choose what feels most comfortable and the staff will help to make sure if fits. Here: are some examples of the different masks that are available:       Key points to remember on your journey with sleep apnea:  1. Sleep study.  PAP devices often need to be adjusted during a sleep study to show that they are effective and adjusted right.  2. Good tips to remember: Try wearing just the mask during a quiet time during the day so your body adapts to wearing it. A humidifier is recommended for comfort in most cases to prevent drying of your nose and throat. Allergy medication from your provider may help you if you are having nasal congestion.  3. Getting settled-in. It takes more than one night for most of us to get used to wearing a mask. Try wearing just the mask during a quiet time during the day so your body adapts to wearing it. A humidifier is recommended for comfort in most cases. Our team will work with you carefully on the first day and  will be in contact within 4 days and again at 2 and 4 weeks for advice and remote device adjustments. Your therapy is evaluated by the device each day.   4. Use it every night. The more you are able to sleep naturally for 7-8 hours, the more likely you will have good sleep and to prevent health risks or symptoms from sleep apnea. Even if you use it 4 hours it helps. Occasionally all of us are unable to use a medical therapy, in sleep apnea, it is not dangerous to miss one night.   5. Communicate. Call our skilled team on the number provided on the first day if your visit for problems that make it difficult to wear the device. Over 2 out of 3 patients can learn to wear the device long-term with help from our team. Remember to call our team or your sleep providers if you are unable to wear the device as we may have other solutions for those who cannot adapt to mask CPAP therapy. It is recommended that you sleep your sleep provider within the first 3 months and yearly after that if you are not having problems.   Take care of your equipment. Make sure you clean your mask and tubing using directions every day and that your filter and mask are replaced as recommended or if they are not working.     BESIDES CPAP, WHAT OTHER THERAPIES ARE THERE?    Positioning Device  Positioning devices are generally used when sleep apnea is mild and only occurs on your back.This example shows a pillow that straps around the waist. It may be appropriate for those whose sleep study shows milder sleep apnea that occurs primarily when lying flat on one's back. Preliminary studies have shown benefit but effectiveness at home may need to be verified by a home sleep test. These devices are generally not covered by medical insurance.  Examples of devices that maintain sleeping on the back to prevent snoring and mild sleep apnea.    Belt type body positioner  Http://[employer]/    Electronic  reminder  Http://nightshifttherapy.com/  Http://www.[employer].com.au/    Oral Appliance  What is oral appliance therapy?  An oral appliance device fits on your teeth at night like a retainer used after having braces. The device is made by a specialized dentist and requires several visits over 1-2 months before a manufactured device is made to fit your teeth and is adjusted to prevent your sleep apnea. Once an oral device is working properly, snoring should be improved. A home sleep test may be recommended at that time if to determine whether the sleep apnea is adequately treated.       Some things to remember:  -Oral devices are often, but not always, covered by your medical insurance. Be sure to check with your insurance provider.   -If you are referred for oral therapy, you will be given a list of specialized dentists to consider or you may choose to visit the Web site of the American Academy of Dental Sleep Medicine  -Oral devices are less likely to work if you have severe sleep apnea or are extremely overweight.     More detailed information  An oral appliance is a small acrylic device that fits over the upper and lower teeth  (similar to a retainer or a mouth guard). This device slightly moves jaw forward, which moves the base of the tongue forward, opens the airway, improves breathing for effective treat snoring and obstructive sleep apnea in perhaps 7 out of 10 people .  The best working devices are custom-made by a dental device  after a mold is made of the teeth 1, 2, 3.  When is an oral appliance indicated?  Oral appliance therapy is recommended as a first-line treatment for patients with primary snoring, mild sleep apnea, and for patients with moderate sleep apnea who prefer appliance therapy to use of CPAP4, 5. Severity of sleep apnea is determined by sleep testing and is based on the number of respiratory events per hour of sleep.   How successful is oral appliance therapy?  The success rate  of oral appliance therapy in patients with mild sleep apnea is 75-80% while in patients with moderate sleep apnea it is 50-70%. The chance of success in patients with severe sleep apnea is 40-50%. The research also shows that oral appliances have a beneficial effect on the cardiovascular health of ADALGISA patients at the same magnitude as CPAP therapy7.  Oral appliances should be a second-line treatment in cases of severe sleep apnea, but if not completely successful then a combination therapy utilizing CPAP plus oral appliance therapy may be effective. Oral appliances tend to be effective in a broad range of patients although studies show that the patients who have the highest success are females, younger patients, those with milder disease, and less severe obesity. 3, 6.   Finding a dentist that practices dental sleep medicine  Specific training is available through the American Academy of Dental Sleep Medicine for dentists interested in working in the field of sleep. To find a dentist who is educated in the field of sleep and the use of oral appliances, near you, visit the Web site of the American Academy of Dental Sleep Medicine.    References  1. John et al. Objectively measured vs self-reported compliance during oral appliance therapy for sleep-disordered breathing. Chest 2013; 144(5): 7131-5589.  2. January et al. Objective measurement of compliance during oral appliance therapy for sleep-disordered breathing. Thorax 2013; 68(1): 91-96.  3. Eri et al. Mandibular advancement devices in 620 men and women with ADALGISA and snoring: tolerability and predictors of treatment success. Chest 2004; 125: 4871-7416.  4. Lizet, et al. Oral appliances for snoring and ADALGISA: a review. Sleep 2006; 29: 244-262.  5. Nahun et al. Oral appliance treatment for ADALGISA: an update. J Clin Sleep Med 2014; 10(2): 215-227.  6. Geo et al. Predictors of OSAH treatment outcome. J Dent Res 2007; 86: 4651-1195.    Weight  Loss:    Weight loss is a long-term strategy that may improve sleep apnea in some patients.    Weight management is a personal decision.  If you are interested in exploring weight loss strategies, the following discussion covers the impact on weight loss on sleep apnea and the approaches that may be successful.    Weight loss decreases severity of sleep apnea in most people with obesity. For those with mild obesity who have developed snoring with weight gain, even 15-30 pound weight loss can improve and occasionally eliminate sleep apnea.  Structured and life-long dietary and health habits are necessary to lose weight and keep healthier weight levels.     Though there may be significant health benefits from weight loss, long-term weight loss is very difficult to achieve- studies show success with dietary management in less than 10% of people. In addition, substantial weight loss may require years of dietary control and may be difficult if patients have severe obesity. In these cases, surgical management may be considered.  Finally, older individuals who have tolerated obesity without health complications may be less likely to benefit from weight loss strategies.      Your BMI is Body mass index is 35.44 kg/(m^2).  Weight management is a personal decision.  If you are interested in exploring weight loss strategies, the following discussion covers the approaches that may be successful. Body mass index (BMI) is one way to tell whether you are at a healthy weight, overweight, or obese. It measures your weight in relation to your height.  A BMI of 18.5 to 24.9 is in the healthy range. A person with a BMI of 25 to 29.9 is considered overweight, and someone with a BMI of 30 or greater is considered obese. More than two-thirds of American adults are considered overweight or obese.  Being overweight or obese increases the risk for further weight gain. Excess weight may lead to heart disease and diabetes.  Creating and  following plans for healthy eating and physical activity may help you improve your health.  Weight control is part of healthy lifestyle and includes exercise, emotional health, and healthy eating habits. Careful eating habits lifelong are the mainstay of weight control. Though there are significant health benefits from weight loss, long-term weight loss with diet alone may be very difficult to achieve- studies show long-term success with dietary management in less than 10% of people. Attaining a healthy weight may be especially difficult to achieve in those with severe obesity. In some cases, medications, devices and surgical management might be considered.  What can you do?  If you are overweight or obese and are interested in methods for weight loss, you should discuss this with your provider.     Consider reducing daily calorie intake by 500 calories.     Keep a food journal.     Avoiding skipping meals, consider cutting portions instead.    Diet combined with exercise helps maintain muscle while optimizing fat loss. Strength training is particularly important for building and maintaining muscle mass. Exercise helps reduce stress, increase energy, and improves fitness. Increasing exercise without diet control, however, may not burn enough calories to loose weight.       Start walking three days a week 10-20 minutes at a time    Work towards walking thirty minutes five days a week     Eventually, increase the speed of your walking for 1-2 minutes at time    In addition, we recommend that you review healthy lifestyles and methods for weight loss available through the National Institutes of Health patient information sites:  http://win.niddk.nih.gov/publications/index.htm    And look into health and wellness programs that may be available through your health insurance provider, employer, local community center, or [employer].    Weight management plan: Patient was referred to their PCP to discuss a diet and exercise  plan.    Surgery:    Surgery for obstructive sleep apnea is considered generally only when other therapies fail to work. Surgery may be discussed with you if you are having a difficult time tolerating CPAP and or when there is an abnormal structure that requires surgical correction.  Nose and throat surgeries often enlarge the airway to prevent collapse.  Most of these surgeries create pain for 1-2 weeks and up to half of the most common surgeries are not effective throughout life.  You should carefully discuss the benefits and drawbacks to surgery with your sleep provider and surgeon to determine if it is the best solution for you.   More information  Surgery for ADALGISA is directed at areas that are responsible for narrowing or complete obstruction of the airway during sleep.  There are a wide range of procedures available to enlarge and/or stabilize the airway to prevent blockage of breathing in the three major areas where it can occur: the palate, tongue, and nasal regions.  Successful surgical treatment depends on the accurate identification of the factors responsible for obstructive sleep apnea in each person.  A personalized approach is required because there is no single treatment that works well for everyone.  Because of anatomic variation, consultation with an examination by a sleep surgeon is a critical first step in determining what surgical options are best for each patient.  In some cases, examination during sedation may be recommended in order to guide the selection of procedures.  Patients will be counseled about risks and benefits as well as the typical recovery course after surgery. Surgery is typically not a cure for a person s ADALGISA.  However, surgery will often significantly improve one s ADALGISA severity (termed  success rate ).  Even in the absence of a cure, surgery will decrease the cardiovascular risk associated with OSA7; improve overall quality of life8 (sleepiness, functionality, sleep quality,  etc).  Palate Procedures:  Patients with ADALGISA often have narrowing of their airway in the region of their tonsils and uvula.  The goals of palate procedures are to widen the airway in this region as well as to help the tissues resist collapse.  Modern palate procedure techniques focus on tissue conservation and soft tissue rearrangement, rather than tissue removal.  Often the uvula is preserved in this procedure. Residual sleep apnea is common in patient after pharyngoplasty with an average reduction in sleep apnea events of 33%2.    Tongue Procedures:  ExamWhile patients are awake, the muscles that surround the throat are active and keep this region open for breathing. These muscles relax during sleep, allowing the tongue and other structures to collapse and block breathing.  There are several different tongue procedures available.  Selection of a tongue base procedure depends on characteristics seen on physical exam.  Generally, procedures are aimed at removing bulky tissues in this area or preventing the back of the tongue from falling back during sleep.  Success rates for tongue surgery range from 50-62%3.  Hypoglossal Nerve Stimulation:  Hypoglossal nerve stimulation has recently received approval from the [place] Food and Drug Administration for the treatment of obstructive sleep apnea.  This is based on research showing that the system was safe and effective in treating sleep apnea6.  Results showed that the median AHI score decreased 68%, from 29.3 to 9.0. This therapy uses an implant system that senses breathing patterns and delivers mild stimulation to airway muscles, which keeps the airway open during sleep.  The system consists of three fully implanted components: a small generator (similar in size to a pacemaker), a breathing sensor, and a stimulation lead.  Using a small handheld remote, a patient turns the therapy on before bed and off upon awakening.    Candidates for this device must be greater  than 22 years of age, have moderate to severe ADALGISA (AHI between 20-65), BMI less than 32, have tried CPAP/oral appliance without success, and have appropriate upper airway anatomy (determined by a sleep endoscopy performed by Dr. Rollins).  Hypoglossal Nerve Stimulation Pathway:    The sleep surgeon s office will work with the patient through the insurance prior-authorization process (including communications and appeals).    Nasal Procedures:  Nasal obstruction can interfere with nasal breathing during the day and night.  Studies have shown that relief of nasal obstruction can improve the ability of some patients to tolerate positive airway pressure therapy for obstructive sleep apnea1.  Treatment options include medications such as nasal saline, topical corticosteroid and antihistamine sprays, and oral medications such as antihistamines or decongestants. Non-surgical treatments can include external nasal dilators for selected patients. If these are not successful by themselves, surgery can improve the nasal airway either alone or in combination with these other options.  Combination Procedures:  Combination of surgical procedures and other treatments may be recommended, particularly if patients have more than one area of narrowing or persistent positional disease.  The success rate of combination surgery ranges from 66-80%2,3.    References  1. Yisel ARTEAGA. The Role of the Nose in Snoring and Obstructive Sleep Apnoea: An Update.  Eur Arch Otorhinolaryngol. 2011; 268: 1365-73.  2.  Fox SM; Tamela JA; Juliet JR; Pallanch JF; Nesha MB; Cruz SG; Kings PILLAI. Surgical modifications of the upper airway for obstructive sleep apnea in adults: a systematic review and meta-analysis. SLEEP 2010;33(10):5194-6818. José Miguel WILSON. Hypopharyngeal surgery in obstructive sleep apnea: an evidence-based medicine review.  Arch Otolaryngol Head Neck Surg. 2006 Feb;132(2):206-13.  3. Shane YH1, Parker Y, Randy DENNIS. The efficacy of  anatomically based multilevel surgery for obstructive sleep apnea. Otolaryngol Head Neck Surg. 2003 Oct;129(4):327-35.  4. José Miguel WILSON, Goldberg A. Hypopharyngeal Surgery in Obstructive Sleep Apnea: An Evidence-Based Medicine Review. Arch Otolaryngol Head Neck Surg. 2006 Feb;132(2):206-13.  5. Meliton ANAYA et al. Upper-Airway Stimulation for Obstructive Sleep Apnea.  N Engl J Med. 2014 Jan 9;370(2):139-49.  6. Lee Y et al. Increased Incidence of Cardiovascular Disease in Middle-aged Men with Obstructive Sleep Apnea. Am J Respir Crit Care Med; 2002 166: 159-165  7. Samantha EM et al. Studying Life Effects and Effectiveness of Palatopharyngoplasty (SLEEP) study: Subjective Outcomes of Isolated Uvulopalatopharyngoplasty. Otolaryngol Head Neck Surg. 2011; 144: 623-631.

## 2018-01-25 NOTE — MR AVS SNAPSHOT
"              After Visit Summary   1/25/2018    Romelia Crystal    MRN: [id number]           Patient Information     Date Of Birth          1965        Visit Information        Provider Department      1/25/2018 3:30 PM Goltz, Bennett Ezra, PA-C [facility]        Today's Diagnoses     ADALGISA (obstructive sleep apnea)          Care Instructions    MY TREATMENT INFORMATION FOR SLEEP APNEA-  Romelia Crystal    DOCTOR : Bennett Ezra Goltz, PA-C  SLEEP CENTER : [facility]     MY CONTACT NUMBER: [phone]    Am I having a sleep study at a sleep center?  Make sure you have an appointment for the study before you leave!    Am I having a home sleep study?  Watch this video:  [email or web address]/watch?v=CteI_[email or web address]&[email or web address]    Frequently asked questions:  1. What is Obstructive Sleep Apnea (ADALGISA)? ADALGISA is the most common type of sleep apnea. Apnea means, \"without breath.\"  Apnea is most often caused by narrowing or collapse of the upper airway as muscles relax during sleep.   Almost everyone has occasional apneas. Most people with sleep apnea have had brief interruptions at night frequently for many years.  The severity of sleep apnea is related to how frequent and severe the events are.   2. What are the consequences of ADALGISA? Symptoms include: feeling sleepy during the day, snoring loudly, gasping or stopping of breathing, trouble sleeping, and occasionally morning headaches or heartburn at night.  Sleepiness can be serious and even increase the risk of falling asleep while driving. Other health consequences may include development of high blood pressure and other cardiovascular disease in persons who are susceptible. Untreated ADALGISA  can contribute to heart disease, stroke and diabetes.   3. What are the treatment options? In most situations, sleep apnea is a lifelong disease that must be managed with daily therapy. Medications are not effective for sleep apnea and surgery is " generally not considered until other therapies have been tried. Your treatment is your choice . Continuous Positive Airway (CPAP) works right away and is the therapy that is effective in nearly everyone. An oral device to hold your jaw forward is usually the next most reliable option. Other options include postioning devices (to keep you off your back), weight loss, and surgery including a tongue pacing device. There is more detail about some of these options below.    Important tips for using CPAP and similar devices   Know your equipment:  CPAP is continuous positive airway pressure that prevents obstructive sleep apnea by keeping the throat from collapsing while you are sleeping. In most cases, the device is  smart  and can slowly self-adjusts if your throat collapses and keeps a record every day of how well you are treated-this information is available to you and your care team.  BPAP is bilevel positive airway pressure that keeps your throat open and also assists each breath with a pressure boost to maintain adequate breathing.  Special kinds of BPAP are used in patients who have inadequate breathing from lung or heart disease. In most cases, the device is  smart  and can slowly self-adjusts to assist breathing. Like CPAP, the device keeps a record of how well you are treated.  Your mask is your connection to the device. You get to choose what feels most comfortable and the staff will help to make sure if fits. Here: are some examples of the different masks that are available:       Key points to remember on your journey with sleep apnea:  1. Sleep study.  PAP devices often need to be adjusted during a sleep study to show that they are effective and adjusted right.  2. Good tips to remember: Try wearing just the mask during a quiet time during the day so your body adapts to wearing it. A humidifier is recommended for comfort in most cases to prevent drying of your nose and throat. Allergy medication from your  provider may help you if you are having nasal congestion.  3. Getting settled-in. It takes more than one night for most of us to get used to wearing a mask. Try wearing just the mask during a quiet time during the day so your body adapts to wearing it. A humidifier is recommended for comfort in most cases. Our team will work with you carefully on the first day and will be in contact within 4 days and again at 2 and 4 weeks for advice and remote device adjustments. Your therapy is evaluated by the device each day.   4. Use it every night. The more you are able to sleep naturally for 7-8 hours, the more likely you will have good sleep and to prevent health risks or symptoms from sleep apnea. Even if you use it 4 hours it helps. Occasionally all of us are unable to use a medical therapy, in sleep apnea, it is not dangerous to miss one night.   5. Communicate. Call our skilled team on the number provided on the first day if your visit for problems that make it difficult to wear the device. Over 2 out of 3 patients can learn to wear the device long-term with help from our team. Remember to call our team or your sleep providers if you are unable to wear the device as we may have other solutions for those who cannot adapt to mask CPAP therapy. It is recommended that you sleep your sleep provider within the first 3 months and yearly after that if you are not having problems.   Take care of your equipment. Make sure you clean your mask and tubing using directions every day and that your filter and mask are replaced as recommended or if they are not working.     BESIDES CPAP, WHAT OTHER THERAPIES ARE THERE?    Positioning Device  Positioning devices are generally used when sleep apnea is mild and only occurs on your back.This example shows a pillow that straps around the waist. It may be appropriate for those whose sleep study shows milder sleep apnea that occurs primarily when lying flat on one's back. Preliminary studies  have shown benefit but effectiveness at home may need to be verified by a home sleep test. These devices are generally not covered by medical insurance.  Examples of devices that maintain sleeping on the back to prevent snoring and mild sleep apnea.    Belt type body positioner  Http://[employer].Maimai/    Electronic reminder  Http://nightshifttherapy.com/  Http://www.Gener[employer].com.au/    Oral Appliance  What is oral appliance therapy?  An oral appliance device fits on your teeth at night like a retainer used after having braces. The device is made by a specialized dentist and requires several visits over 1-2 months before a manufactured device is made to fit your teeth and is adjusted to prevent your sleep apnea. Once an oral device is working properly, snoring should be improved. A home sleep test may be recommended at that time if to determine whether the sleep apnea is adequately treated.       Some things to remember:  -Oral devices are often, but not always, covered by your medical insurance. Be sure to check with your insurance provider.   -If you are referred for oral therapy, you will be given a list of specialized dentists to consider or you may choose to visit the Web site of the American Academy of Dental Sleep Medicine  -Oral devices are less likely to work if you have severe sleep apnea or are extremely overweight.     More detailed information  An oral appliance is a small acrylic device that fits over the upper and lower teeth  (similar to a retainer or a mouth guard). This device slightly moves jaw forward, which moves the base of the tongue forward, opens the airway, improves breathing for effective treat snoring and obstructive sleep apnea in perhaps 7 out of 10 people .  The best working devices are custom-made by a dental device  after a mold is made of the teeth 1, 2, 3.  When is an oral appliance indicated?  Oral appliance therapy is recommended as a first-line treatment for patients  with primary snoring, mild sleep apnea, and for patients with moderate sleep apnea who prefer appliance therapy to use of CPAP4, 5. Severity of sleep apnea is determined by sleep testing and is based on the number of respiratory events per hour of sleep.   How successful is oral appliance therapy?  The success rate of oral appliance therapy in patients with mild sleep apnea is 75-80% while in patients with moderate sleep apnea it is 50-70%. The chance of success in patients with severe sleep apnea is 40-50%. The research also shows that oral appliances have a beneficial effect on the cardiovascular health of ADALGISA patients at the same magnitude as CPAP therapy7.  Oral appliances should be a second-line treatment in cases of severe sleep apnea, but if not completely successful then a combination therapy utilizing CPAP plus oral appliance therapy may be effective. Oral appliances tend to be effective in a broad range of patients although studies show that the patients who have the highest success are females, younger patients, those with milder disease, and less severe obesity. 3, 6.   Finding a dentist that practices dental sleep medicine  Specific training is available through the American Academy of Dental Sleep Medicine for dentists interested in working in the field of sleep. To find a dentist who is educated in the field of sleep and the use of oral appliances, near you, visit the Web site of the American Academy of Dental Sleep Medicine.    References  1. John et al. Objectively measured vs self-reported compliance during oral appliance therapy for sleep-disordered breathing. Chest 2013; 144(5): 7904-0887.  2. January, et al. Objective measurement of compliance during oral appliance therapy for sleep-disordered breathing. Thorax 2013; 68(1): 91-96.  3. Eri et al. Mandibular advancement devices in 620 men and women with ADALGISA and snoring: tolerability and predictors of treatment success. Chest 2004;  125: 3492-8640.  4. Lizet et al. Oral appliances for snoring and ADALGISA: a review. Sleep 2006; 29: 244-262.  5. Nahun et al. Oral appliance treatment for ADALGISA: an update. J Clin Sleep Med 2014; 10(2): 215-227.  6. Geo et al. Predictors of OSAH treatment outcome. J Dent Res 2007; 86: 1999-2061.    Weight Loss:    Weight loss is a long-term strategy that may improve sleep apnea in some patients.    Weight management is a personal decision.  If you are interested in exploring weight loss strategies, the following discussion covers the impact on weight loss on sleep apnea and the approaches that may be successful.    Weight loss decreases severity of sleep apnea in most people with obesity. For those with mild obesity who have developed snoring with weight gain, even 15-30 pound weight loss can improve and occasionally eliminate sleep apnea.  Structured and life-long dietary and health habits are necessary to lose weight and keep healthier weight levels.     Though there may be significant health benefits from weight loss, long-term weight loss is very difficult to achieve- studies show success with dietary management in less than 10% of people. In addition, substantial weight loss may require years of dietary control and may be difficult if patients have severe obesity. In these cases, surgical management may be considered.  Finally, older individuals who have tolerated obesity without health complications may be less likely to benefit from weight loss strategies.      Your BMI is Body mass index is 35.44 kg/(m^2).  Weight management is a personal decision.  If you are interested in exploring weight loss strategies, the following discussion covers the approaches that may be successful. Body mass index (BMI) is one way to tell whether you are at a healthy weight, overweight, or obese. It measures your weight in relation to your height.  A BMI of 18.5 to 24.9 is in the healthy range. A person with a BMI of 25  to 29.9 is considered overweight, and someone with a BMI of 30 or greater is considered obese. More than two-thirds of American adults are considered overweight or obese.  Being overweight or obese increases the risk for further weight gain. Excess weight may lead to heart disease and diabetes.  Creating and following plans for healthy eating and physical activity may help you improve your health.  Weight control is part of healthy lifestyle and includes exercise, emotional health, and healthy eating habits. Careful eating habits lifelong are the mainstay of weight control. Though there are significant health benefits from weight loss, long-term weight loss with diet alone may be very difficult to achieve- studies show long-term success with dietary management in less than 10% of people. Attaining a healthy weight may be especially difficult to achieve in those with severe obesity. In some cases, medications, devices and surgical management might be considered.  What can you do?  If you are overweight or obese and are interested in methods for weight loss, you should discuss this with your provider.     Consider reducing daily calorie intake by 500 calories.     Keep a food journal.     Avoiding skipping meals, consider cutting portions instead.    Diet combined with exercise helps maintain muscle while optimizing fat loss. Strength training is particularly important for building and maintaining muscle mass. Exercise helps reduce stress, increase energy, and improves fitness. Increasing exercise without diet control, however, may not burn enough calories to loose weight.       Start walking three days a week 10-20 minutes at a time    Work towards walking thirty minutes five days a week     Eventually, increase the speed of your walking for 1-2 minutes at time    In addition, we recommend that you review healthy lifestyles and methods for weight loss available through the National Institutes of Health patient  information sites:  http://win.niddk.nih.gov/publications/index.htm    And look into health and wellness programs that may be available through your health insurance provider, employer, local community center, or [employer].    Weight management plan: Patient was referred to their PCP to discuss a diet and exercise plan.    Surgery:    Surgery for obstructive sleep apnea is considered generally only when other therapies fail to work. Surgery may be discussed with you if you are having a difficult time tolerating CPAP and or when there is an abnormal structure that requires surgical correction.  Nose and throat surgeries often enlarge the airway to prevent collapse.  Most of these surgeries create pain for 1-2 weeks and up to half of the most common surgeries are not effective throughout life.  You should carefully discuss the benefits and drawbacks to surgery with your sleep provider and surgeon to determine if it is the best solution for you.   More information  Surgery for ADALGISA is directed at areas that are responsible for narrowing or complete obstruction of the airway during sleep.  There are a wide range of procedures available to enlarge and/or stabilize the airway to prevent blockage of breathing in the three major areas where it can occur: the palate, tongue, and nasal regions.  Successful surgical treatment depends on the accurate identification of the factors responsible for obstructive sleep apnea in each person.  A personalized approach is required because there is no single treatment that works well for everyone.  Because of anatomic variation, consultation with an examination by a sleep surgeon is a critical first step in determining what surgical options are best for each patient.  In some cases, examination during sedation may be recommended in order to guide the selection of procedures.  Patients will be counseled about risks and benefits as well as the typical recovery course after surgery. Surgery  is typically not a cure for a person s ADALGISA.  However, surgery will often significantly improve one s ADALGISA severity (termed  success rate ).  Even in the absence of a cure, surgery will decrease the cardiovascular risk associated with OSA7; improve overall quality of life8 (sleepiness, functionality, sleep quality, etc).  Palate Procedures:  Patients with ADALGISA often have narrowing of their airway in the region of their tonsils and uvula.  The goals of palate procedures are to widen the airway in this region as well as to help the tissues resist collapse.  Modern palate procedure techniques focus on tissue conservation and soft tissue rearrangement, rather than tissue removal.  Often the uvula is preserved in this procedure. Residual sleep apnea is common in patient after pharyngoplasty with an average reduction in sleep apnea events of 33%2.    Tongue Procedures:  ExamWhile patients are awake, the muscles that surround the throat are active and keep this region open for breathing. These muscles relax during sleep, allowing the tongue and other structures to collapse and block breathing.  There are several different tongue procedures available.  Selection of a tongue base procedure depends on characteristics seen on physical exam.  Generally, procedures are aimed at removing bulky tissues in this area or preventing the back of the tongue from falling back during sleep.  Success rates for tongue surgery range from 50-62%3.  Hypoglossal Nerve Stimulation:  Hypoglossal nerve stimulation has recently received approval from the [place] Food and Drug Administration for the treatment of obstructive sleep apnea.  This is based on research showing that the system was safe and effective in treating sleep apnea6.  Results showed that the median AHI score decreased 68%, from 29.3 to 9.0. This therapy uses an implant system that senses breathing patterns and delivers mild stimulation to airway muscles, which keeps the airway  open during sleep.  The system consists of three fully implanted components: a small generator (similar in size to a pacemaker), a breathing sensor, and a stimulation lead.  Using a small handheld remote, a patient turns the therapy on before bed and off upon awakening.    Candidates for this device must be greater than 22 years of age, have moderate to severe ADALGISA (AHI between 20-65), BMI less than 32, have tried CPAP/oral appliance without success, and have appropriate upper airway anatomy (determined by a sleep endoscopy performed by Dr. Rollins).  Hypoglossal Nerve Stimulation Pathway:    The sleep surgeon s office will work with the patient through the insurance prior-authorization process (including communications and appeals).    Nasal Procedures:  Nasal obstruction can interfere with nasal breathing during the day and night.  Studies have shown that relief of nasal obstruction can improve the ability of some patients to tolerate positive airway pressure therapy for obstructive sleep apnea1.  Treatment options include medications such as nasal saline, topical corticosteroid and antihistamine sprays, and oral medications such as antihistamines or decongestants. Non-surgical treatments can include external nasal dilators for selected patients. If these are not successful by themselves, surgery can improve the nasal airway either alone or in combination with these other options.  Combination Procedures:  Combination of surgical procedures and other treatments may be recommended, particularly if patients have more than one area of narrowing or persistent positional disease.  The success rate of combination surgery ranges from 66-80%2,3.    References  1. Yisel ARTEAGA. The Role of the Nose in Snoring and Obstructive Sleep Apnoea: An Update.  Eur Arch Otorhinolaryngol. 2011; 268: 1365-73.  2.  Fox SM; Tamela JA; Jluiet JR; Pallanch JF; Nesha VASQUEZ; Cruz GONZALEZ; Kings PILLAI. Surgical modifications of the upper airway for  obstructive sleep apnea in adults: a systematic review and meta-analysis. SLEEP 2010;33(10):8226-3697. José Miguel WILSON. Hypopharyngeal surgery in obstructive sleep apnea: an evidence-based medicine review.  Arch Otolaryngol Head Neck Surg. 2006 Feb;132(2):206-13.  3. Shane YH1, Parker Y, Randy DENNIS. The efficacy of anatomically based multilevel surgery for obstructive sleep apnea. Otolaryngol Head Neck Surg. 2003 Oct;129(4):327-35.  4. Kezirian E, Goldberg A. Hypopharyngeal Surgery in Obstructive Sleep Apnea: An Evidence-Based Medicine Review. Arch Otolaryngol Head Neck Surg. 2006 Feb;132(2):206-13.  5. Meliton ANAYA et al. Upper-Airway Stimulation for Obstructive Sleep Apnea.  N Engl J Med. 2014 Jan 9;370(2):139-49.  6. Lee Y et al. Increased Incidence of Cardiovascular Disease in Middle-aged Men with Obstructive Sleep Apnea. Am J Respir Crit Care Med; 2002 166: 159-165  7. Singh EM et al. Studying Life Effects and Effectiveness of Palatopharyngoplasty (SLEEP) study: Subjective Outcomes of Isolated Uvulopalatopharyngoplasty. Otolaryngol Head Neck Surg. 2011; 144: 623-631.              Follow-ups after your visit        Follow-up notes from your care team     Return in about 2 weeks (around 2/8/2018) for Sleep Study Review.      Your next 10 appointments already scheduled     Mar 08, 2018  8:30 PM CST   PSG Split with BED 2 SH SLEEP   [facility] ([facility])    [address] [address]   [phone]            Mar 23, 2018 10:30 AM CDT   Return Sleep Patient with Bennett Ezra Goltz, PA-C   [facility] ([facility])    [address] [address]   [phone]              Future tests that were ordered for you today     Open Future Orders        Priority Expected Expires Ordered    Comprehensive Sleep Study Routine  7/24/2018 1/25/2018            Who to contact     If you have questions or  "need follow up information about today's clinic visit or your schedule please contact [facility] directly at [phone].  Normal or non-critical lab and imaging results will be communicated to you by MyChart, letter or phone within 4 business days after the clinic has received the results. If you do not hear from us within 7 days, please contact the clinic through mSnaphart or phone. If you have a critical or abnormal lab result, we will notify you by phone as soon as possible.  Submit refill requests through [employer] or call your pharmacy and they will forward the refill request to us. Please allow 3 business days for your refill to be completed.          Additional Information About Your Visit        mSnapharfarmaciamarket Information     [employer] gives you secure access to your electronic health record. If you see a primary care provider, you can also send messages to your care team and make appointments. If you have questions, please call your primary care clinic.  If you do not have a primary care provider, please call [phone] and they will assist you.        Care EveryWhere ID     This is your Care EveryWhere ID. This could be used by other organizations to access your [place] medical records  [id number]        Your Vitals Were     Pulse Respirations Height Pulse Oximetry BMI (Body Mass Index)       84 16 1.778 m (5' 10\") 96% 35.44 kg/m2        Blood Pressure from Last 3 Encounters:   01/25/18 129/85   01/10/18 134/89   12/21/17 120/81    Weight from Last 3 Encounters:   01/25/18 112 kg (247 lb)   01/10/18 111.4 kg (245 lb 8 oz)   12/20/17 112.4 kg (247 lb 12.8 oz)              We Performed the Following     SLEEP EVALUATION & MANAGEMENT REFERRAL - ADULT -[place] [facility] [phone]  (Age 18 and up)          Today's Medication Changes          These changes are accurate as of 1/25/18  4:25 PM.  If you have any questions, ask your nurse or doctor.               These medicines have " changed or have updated prescriptions.        Dose/Directions    * albuterol (2.5 MG/3ML) 0.083% neb solution   This may have changed:  Another medication with the same name was changed. Make sure you understand how and when to take each.        Dose:  1 vial   Take 1 vial by nebulization every 6 hours as needed for shortness of breath / dyspnea or wheezing   Refills:  0       * albuterol 108 (90 BASE) MCG/ACT Inhaler   Commonly known as:  PROAIR HFA/PROVENTIL HFA/VENTOLIN HFA   This may have changed:    - when to take this  - reasons to take this   Used for:  Mild persistent asthma, uncomplicated        Dose:  2 puff   Inhale 2 puffs into the lungs every 6 hours   Quantity:  1 Inhaler   Refills:  6       loratadine 10 MG tablet   Commonly known as:  CLARITIN   This may have changed:    - when to take this  - reasons to take this   Used for:  Allergic rhinitis, mild        Dose:  10 mg   Take 1 tablet (10 mg) by mouth daily   Quantity:  30 tablet   Refills:  0       * Notice:  This list has 2 medication(s) that are the same as other medications prescribed for you. Read the directions carefully, and ask your doctor or other care provider to review them with you.             Primary Care Provider Office Phone # Fax #    Sandra Guardado PA-C [phone] [address] [address]        Equal Access to Services     VIVIEN GRANADOS : Hadii rosenda reyeso Sorobert, waaxda luqadaha, qaybta kaalmada sam, herber hernandez. So [facility] [phone].    ATENCIÓN: Si habla español, tiene a salgado disposición servicios gratuitos de asistencia lingüística. Llame al [phone].    We comply with applicable federal civil rights laws and [place] laws. We do not discriminate on the basis of race, color, national origin, age, disability, sex, sexual orientation, or gender identity.            Thank you!     Thank you for choosing [facility]  for your care.  Our goal is always to provide you with excellent care. Hearing back from our patients is one way we can continue to improve our services. Please take a few minutes to complete the written survey that you may receive in the mail after your visit with us. Thank you!             Your Updated Medication List - Protect others around you: Learn how to safely use, store and throw away your medicines at www.disposemymeds.org.          This list is accurate as of 1/25/18  4:25 PM.  Always use your most recent med list.                   Brand Name Dispense Instructions for use Diagnosis    * albuterol (2.5 MG/3ML) 0.083% neb solution      Take 1 vial by nebulization every 6 hours as needed for shortness of breath / dyspnea or wheezing        * albuterol 108 (90 BASE) MCG/ACT Inhaler    PROAIR HFA/PROVENTIL HFA/VENTOLIN HFA    1 Inhaler    Inhale 2 puffs into the lungs every 6 hours    Mild persistent asthma, uncomplicated       amLODIPine 5 MG tablet    NORVASC    90 tablet    Take 1 tablet (5 mg) by mouth daily    Essential hypertension, benign       buPROPion 300 MG 24 hr tablet    WELLBUTRIN XL    90 tablet    Take 1 tablet (300 mg) by mouth every morning    Mild recurrent major depression (H)       fluticasone-salmeterol 250-50 MCG/DOSE diskus inhaler    ADVAIR    3 Inhaler    Inhale 1 puff into the lungs 2 times daily    Mild persistent asthma, uncomplicated       loratadine 10 MG tablet    CLARITIN    30 tablet    Take 1 tablet (10 mg) by mouth daily    Allergic rhinitis, mild       rosuvastatin 5 MG tablet    CRESTOR    90 tablet    Take 1 tablet by mouth once daily    Hyperlipidemia with target LDL less than 130       venlafaxine 75 MG 24 hr capsule    EFFEXOR-XR     Take 150 mg by mouth daily        * Notice:  This list has 2 medication(s) that are the same as other medications prescribed for you. Read the directions carefully, and ask your doctor or other care provider to review them with you.

## 2018-01-25 NOTE — PROGRESS NOTES
Sleep Consultation:    Date on this visit: 1/25/2018    Romelia Crystal is sent by Sandra Guardado for a sleep consultation regarding ADALGISA.    Primary Physician: Sandra Guardado     Romelia Crystal presents for further management of previously diagnosed ADALGISA. His medical history is significant for HTN, depression, moderate asthma, hyperlipidemia, diverticulitis.  He had a sleep study at [facility] in 2001. His AHI was 6.1/hr and RDI was 33.2/hr. His O2 apollo was 89%. He spent 31.6% of the night supine and had 75.5% of his breathing events in that position. CPAP was titrated to 12 cm. He weighed 236 pounds at that time.    He tried CPAP for maybe 1.5 years, but it was hard to tolerate due to nasal polyps, congestion. He slept well with it when he could keep it on. He does not feel he sleeps soundly now.    Romelia goes to bed at 9:00 PM during the week. He checks emails or watches TV on his phoen in bed until 10-11:30 PM.  He wakes up at 7:45 AM with an alarm. He hits snooze a couple times. He falls asleep in 60 minutes (because he is on electronics).  Romelia denies difficulty falling asleep.  He wakes up 4-5 times a night for 5 minutes before falling back to sleep.  Romelia wakes up to go to the bathroom and external stimuli.  On weekends, Romelia goes to sleep at 9:30 PM.  He wakes up at 11:00 AM without an alarm. He falls asleep in 60 minutes.  Patient gets an average of 5-7 hours of sleep per week night and 11 hours on weekends.     Patient does use electronics in bed, and works in bed and does not watch TV in bed. He denies worrying in bed about anything.    Romelia does not do shift work.  He works day shifts.  He works as a . He lives with his wife.    Romelia does snore every night and snoring is loud. Patient does have a regular bed partner. There is not report of gasping, snorting or witnessed apneas. They rarely sleep separately due to his snoring. His wife wears ear plugs.  Patient sleeps on his back  and sides (mostly). He has occasional snort arousals (nightly), morning dry mouth and morning headaches (a couple per month, top of head), denies morning confusion and restless legs. Romelia denies any bruxism, sleep walking, sleep talking, dream enactment, sleep paralysis, cataplexy and hypnogogic/hypnopompic hallucinations. He occasionally wakes feeling his whole body is shaking or buzzing and it takes a couple seconds to go away. That happens often over the last 6 years or so.    Romelia has difficulty breathing through his nose, depression and anxiety, denies claustrophobia, reflux at night and heartburn.  He uses his albuterol inhaler once a day (at bedtime) recently. He is allergic his cats. He also uses Advair regularly.    Romelia has gained 10-15 pounds in 17 years.  Patient describes themself as neither a morning or night person.  He would prefer to go to sleep at 11:00 PM and wake up at 9:30-10 AM.  Patient's Heyburn Sleepiness score 8/24 inconsistent with daytime sleepiness.      Romelia naps 0-1 times per week for 3 hours, feels refreshed after naps. He takes infrequent inadvertant naps at work. He is pretty busy most of the time though.  He denies dozing while driving. Patient was counseled on the importance of driving while alert, to pull over if drowsy, or nap before getting into the vehicle if sleepy.  He uses 32 oz/day of coffee. Last caffeine intake is usually before noon.    Allergies:    Allergies   Allergen Reactions     Hmg-Coa-R Inhibitors Other (See Comments)     Some Statins Muscle aches      Lisinopril Cough       Medications:    Current Outpatient Prescriptions   Medication Sig Dispense Refill     albuterol (2.5 MG/3ML) 0.083% neb solution Take 1 vial by nebulization every 6 hours as needed for shortness of breath / dyspnea or wheezing       rosuvastatin (CRESTOR) 5 MG tablet Take 1 tablet by mouth once daily 90 tablet 0     amLODIPine (NORVASC) 5 MG tablet Take 1 tablet (5 mg) by mouth daily 90  tablet 3     fluticasone-salmeterol (ADVAIR) 250-50 MCG/DOSE diskus inhaler Inhale 1 puff into the lungs 2 times daily 3 Inhaler 3     albuterol (PROAIR HFA/PROVENTIL HFA/VENTOLIN HFA) 108 (90 BASE) MCG/ACT Inhaler Inhale 2 puffs into the lungs every 6 hours (Patient taking differently: Inhale 2 puffs into the lungs every 6 hours as needed ) 1 Inhaler 6     loratadine (CLARITIN) 10 MG tablet Take 1 tablet (10 mg) by mouth daily (Patient taking differently: Take 10 mg by mouth as needed ) 30 tablet 0     buPROPion (WELLBUTRIN XL) 300 MG 24 hr tablet Take 1 tablet (300 mg) by mouth every morning 90 tablet 1     venlafaxine (EFFEXOR-XR) 75 MG 24 hr capsule Take 150 mg by mouth daily          Problem List:  Patient Active Problem List    Diagnosis Date Noted     Diverticulitis 12/20/2017     Priority: Medium     Moderate persistent asthma, uncomplicated 04/11/2016     Priority: Medium     Decreased libido 04/11/2016     Priority: Medium     Essential hypertension, benign 08/05/2014     Priority: Medium     Eczema      Priority: Medium     Hives 01/29/2013     Priority: Medium     BPPV (benign paroxysmal positional vertigo) 01/29/2013     Priority: Medium     Mild recurrent major depression (H) 06/12/2012     Priority: Medium     Hyperlipidemia with target LDL less than 130      Priority: Medium     Diagnosis updated by automated process. Provider to review and confirm.       Allergic rhinitis, mild      Priority: Medium     with cough          Past Medical/Surgical History:  Past Medical History:   Diagnosis Date     Allergic rhinitis, mild     with cough     Eczema      HTN (hypertension), benign      Hyperlipidemia LDL goal < 130      Mild persistent asthma      Mild persistent asthma      Mild recurrent major depression (H) 6/12/2012    Followed by Rivera Dinero at [facility]     Past Surgical History:   Procedure Laterality Date     TONSILLECTOMY       wisdom teeth         Social History:  Social History     Social  History     Marital status:      Spouse name: N/A     Number of children: N/A     Years of education: N/A     Occupational History     Not on file.     Social History Main Topics     Smoking status: Never Smoker     Smokeless tobacco: Never Used     Alcohol use 0.0 oz/week     0 Standard drinks or equivalent per week      Comment: few beers on weekend     Drug use: No     Sexual activity: Yes     Partners: Female     Other Topics Concern     Not on file     Social History Narrative    2 sisters, one brother    Father  of lymphoma    Mother alive and well at 70yo.        Wife is Patsy.       Family History:  Family History   Problem Relation Age of Onset     Lipids Father      CANCER Father 50     lymphoma     Family History Negative Mother 74     Other - See Comments Brother      diverticulitis       Review of Systems:  A complete review of systems reviewed by me is negative with the exeption of what has been mentioned in the history of present illness.  CONSTITUTIONAL: NEGATIVE for weight gain/loss, fever, chills, sweats or night sweats, drug allergies.  EYES: NEGATIVE for changes in vision, blind spots, double vision.  ENT: NEGATIVE for ear pain, sore throat, post-nasal drip, runny nose, bloody nose  ENT:  POSITIVE for  sinus pain and nasal polyps  CARDIAC: NEGATIVE for fast heartbeats or fluttering in chest, chest pain or pressure, breathlessness when lying flat, swollen legs or swollen feet.  CARDIAC:  POSITIVE for  HTN  NEUROLOGIC: NEGATIVE weakness or numbness in the arms or legs.  NEUROLOGIC:  POSITIVE for  headaches  DERMATOLOGIC: NEGATIVE for new moles or change in mole(s)  DERMATOLOGIC:  POSITIVE for  rashes and hives  PULMONARY: NEGATIVE dry cough, productive cough, coughing up blood, whistling when breathing.    PULMONARY:  POSITIVE for  SOB at rest, SOB with activity and wheezing   GASTROINTESTINAL: NEGATIVE for nausea or vomitting, loose or watery stools, fat or grease in stools,  "constipation, abdominal pain, bowel movements black in color or blood noted.  GENITOURINARY: NEGATIVE for pain during urination, blood in urine, urinating more frequently than usual, irregular menstrual periods.  MUSCULOSKELETAL: NEGATIVE for muscle pain, bone or joint pain, swollen joints.  ENDOCRINE: NEGATIVE for increased thirst or urination, diabetes.  LYMPHATIC: NEGATIVE for swollen lymph nodes, lumps or bumps in the breasts or nipple discharge.  MENTAL HEALTH: POSITIVE for depression and anxiety    Physical Examination:  Vitals: /85  Pulse 84  Resp 16  Ht 1.778 m (5' 10\")  Wt 112 kg (247 lb)  SpO2 96%  BMI 35.44 kg/m2  BMI= Body mass index is 35.44 kg/(m^2).    Neck Cir (cm): 44 cm    Hingham Total Score 1/25/2018   Total score - Hingham 8       GENERAL APPEARANCE: healthy, alert, no distress and cooperative  EYES: Eyes grossly normal to inspection, PERRL, conjunctivae and sclerae normal and lids and lashes normal  HENT: nose and mouth without ulcers or lesions, oral mucous membranes moist and oropharynx clear  NECK: no adenopathy, no asymmetry, masses, or scars, thyroid normal to palpation and trachea midline and normal to palpation  RESP: lungs clear to auscultation - no rales, rhonchi or wheezes  CV: regular rates and rhythm, normal S1 S2, no S3 or S4, no murmur, click or rub and no irregular beats  LYMPHATICS: normal ant/post cervical and supraclavicular nodes  MS: extremities normal- no gross deformities noted  NEURO: Normal strength and tone, mentation intact, speech normal and cranial nerves 2-12 intact  Mallampati Class: I.  Tonsillar Stage: 0  surgically removed.    Impression/Plan:    (G47.33) ADALGISA (obstructive sleep apnea)  Comment: Romelia presents with primary symptoms of terrible snoring and poor quality/unrefreshing sleep. These have been symptoms for at least 20 years. He had a sleep study in 2001 at [facility]. His AHI was 6.1/hr, all hypopneas, and RDI of 33.2/hr. His O2 apollo was 89%. Most " of his events occurred while supine. He tried CPAP for about 1.5 years and felt better when he could use it. He had significant congestion from nasal polyps, and eventually could not use CPAP regularly. He presents today to see if there are any options for treating his ADALGISA. He sleeps mostly laterally. He is not observed to have pauses in breathing. He can take long naps occasionally, but denies inadvertent dozing for the most part. He does have well-controlled HTN. His weight is up about 10 pounds compared to 2001. BMI is now 35. His neck is 44 cm and age is 52. He should have another study to determine current severity of ADALGISA in order to decide what treatment options would possibly be effective.  Plan: Comprehensive Sleep Study        Split night PSG with CPAP/BiPAP titration if indicated.       Literature provided regarding sleep apnea.      He will follow up with me in approximately two weeks after his sleep study has been competed to review the results and discuss plan of care.       Polysomnography reviewed.  Obstructive sleep apnea reviewed.  Complications of untreated sleep apnea were reviewed.  45 minutes was spent during this visit, over 50% in counseling and coordination of care.   Bennett Goltz, PA-C    CC: Sandra Guardado

## 2018-01-25 NOTE — NURSING NOTE
"Chief Complaint   Patient presents with     Sleep Problem     ADALGISA       Initial /85  Pulse 84  Resp 16  Ht 1.778 m (5' 10\")  Wt 112 kg (247 lb)  SpO2 96%  BMI 35.44 kg/m2 Estimated body mass index is 35.44 kg/(m^2) as calculated from the following:    Height as of this encounter: 1.778 m (5' 10\").    Weight as of this encounter: 112 kg (247 lb).  Medication Reconciliation: complete   ESS 8  Neck 44cm  Mary Mathias MA      "

## 2018-02-02 ENCOUNTER — APPOINTMENT (OUTPATIENT)
Dept: MRI IMAGING | Facility: CLINIC | Age: 53
End: 2018-02-02
Attending: EMERGENCY MEDICINE
Payer: COMMERCIAL

## 2018-02-02 ENCOUNTER — HOSPITAL ENCOUNTER (EMERGENCY)
Facility: CLINIC | Age: 53
Discharge: HOME | End: 2018-02-02
Attending: EMERGENCY MEDICINE | Admitting: EMERGENCY MEDICINE
Payer: COMMERCIAL

## 2018-02-02 ENCOUNTER — OFFICE VISIT (OUTPATIENT)
Dept: FAMILY MEDICINE | Facility: CLINIC | Age: 53
End: 2018-02-02
Payer: COMMERCIAL

## 2018-02-02 VITALS
WEIGHT: 245 LBS | RESPIRATION RATE: 18 BRPM | OXYGEN SATURATION: 98 % | HEIGHT: 70 IN | TEMPERATURE: 97.6 F | BODY MASS INDEX: 35.07 KG/M2 | HEART RATE: 81 BPM | DIASTOLIC BLOOD PRESSURE: 94 MMHG | SYSTOLIC BLOOD PRESSURE: 146 MMHG

## 2018-02-02 VITALS
OXYGEN SATURATION: 99 % | HEIGHT: 70 IN | SYSTOLIC BLOOD PRESSURE: 134 MMHG | DIASTOLIC BLOOD PRESSURE: 97 MMHG | TEMPERATURE: 96.9 F | WEIGHT: 245 LBS | HEART RATE: 80 BPM | BODY MASS INDEX: 35.07 KG/M2

## 2018-02-02 DIAGNOSIS — H81.22 VESTIBULAR NEURITIS, LEFT: ICD-10-CM

## 2018-02-02 DIAGNOSIS — R42 DIZZINESS: Primary | ICD-10-CM

## 2018-02-02 DIAGNOSIS — R11.2 NON-INTRACTABLE VOMITING WITH NAUSEA, UNSPECIFIED VOMITING TYPE: ICD-10-CM

## 2018-02-02 LAB
ALBUMIN SERPL-MCNC: 3.8 G/DL (ref 3.4–5)
ALBUMIN UR-MCNC: NEGATIVE MG/DL
ALP SERPL-CCNC: 86 U/L (ref 40–150)
ALT SERPL W P-5'-P-CCNC: 37 U/L (ref 0–70)
ANION GAP SERPL CALCULATED.3IONS-SCNC: 4 MMOL/L (ref 3–14)
APPEARANCE UR: CLEAR
AST SERPL W P-5'-P-CCNC: 25 U/L (ref 0–45)
BASOPHILS # BLD AUTO: 0 10E9/L (ref 0–0.2)
BASOPHILS NFR BLD AUTO: 0.5 %
BILIRUB SERPL-MCNC: 0.5 MG/DL (ref 0.2–1.3)
BILIRUB UR QL STRIP: NEGATIVE
BUN SERPL-MCNC: 13 MG/DL (ref 7–30)
CALCIUM SERPL-MCNC: 9.3 MG/DL (ref 8.5–10.1)
CHLORIDE SERPL-SCNC: 106 MMOL/L (ref 94–109)
CO2 SERPL-SCNC: 28 MMOL/L (ref 20–32)
COLOR UR AUTO: YELLOW
CREAT SERPL-MCNC: 0.79 MG/DL (ref 0.66–1.25)
DIFFERENTIAL METHOD BLD: NORMAL
EOSINOPHIL # BLD AUTO: 0.1 10E9/L (ref 0–0.7)
EOSINOPHIL NFR BLD AUTO: 1.7 %
ERYTHROCYTE [DISTWIDTH] IN BLOOD BY AUTOMATED COUNT: 13.4 % (ref 10–15)
GFR SERPL CREATININE-BSD FRML MDRD: >90 ML/MIN/1.7M2
GLUCOSE SERPL-MCNC: 125 MG/DL (ref 70–99)
GLUCOSE UR STRIP-MCNC: NEGATIVE MG/DL
HCT VFR BLD AUTO: 43.9 % (ref 40–53)
HGB BLD-MCNC: 15.3 G/DL (ref 13.3–17.7)
HGB UR QL STRIP: NEGATIVE
IMM GRANULOCYTES # BLD: 0 10E9/L (ref 0–0.4)
IMM GRANULOCYTES NFR BLD: 0.2 %
INTERPRETATION ECG - MUSE: NORMAL
KETONES UR STRIP-MCNC: NEGATIVE MG/DL
LEUKOCYTE ESTERASE UR QL STRIP: ABNORMAL
LYMPHOCYTES # BLD AUTO: 1 10E9/L (ref 0.8–5.3)
LYMPHOCYTES NFR BLD AUTO: 11.3 %
MCH RBC QN AUTO: 30.1 PG (ref 26.5–33)
MCHC RBC AUTO-ENTMCNC: 34.9 G/DL (ref 31.5–36.5)
MCV RBC AUTO: 86 FL (ref 78–100)
MONOCYTES # BLD AUTO: 0.4 10E9/L (ref 0–1.3)
MONOCYTES NFR BLD AUTO: 4.2 %
MUCOUS THREADS #/AREA URNS LPF: PRESENT /LPF
NEUTROPHILS # BLD AUTO: 6.9 10E9/L (ref 1.6–8.3)
NEUTROPHILS NFR BLD AUTO: 82.1 %
NITRATE UR QL: NEGATIVE
NRBC # BLD AUTO: 0 10*3/UL
NRBC BLD AUTO-RTO: 0 /100
PH UR STRIP: 6 PH (ref 5–7)
PLATELET # BLD AUTO: 225 10E9/L (ref 150–450)
POTASSIUM SERPL-SCNC: 4.1 MMOL/L (ref 3.4–5.3)
PROT SERPL-MCNC: 7.3 G/DL (ref 6.8–8.8)
RBC # BLD AUTO: 5.08 10E12/L (ref 4.4–5.9)
RBC #/AREA URNS AUTO: ABNORMAL /HPF
SODIUM SERPL-SCNC: 138 MMOL/L (ref 133–144)
SOURCE: ABNORMAL
SP GR UR STRIP: 1.01 (ref 1–1.03)
UROBILINOGEN UR STRIP-ACNC: 0.2 EU/DL (ref 0.2–1)
WBC # BLD AUTO: 8.4 10E9/L (ref 4–11)
WBC #/AREA URNS AUTO: ABNORMAL /HPF

## 2018-02-02 PROCEDURE — 99213 OFFICE O/P EST LOW 20 MIN: CPT | Performed by: NURSE PRACTITIONER

## 2018-02-02 PROCEDURE — 85025 COMPLETE CBC W/AUTO DIFF WBC: CPT | Performed by: EMERGENCY MEDICINE

## 2018-02-02 PROCEDURE — 96361 HYDRATE IV INFUSION ADD-ON: CPT

## 2018-02-02 PROCEDURE — 25000128 H RX IP 250 OP 636

## 2018-02-02 PROCEDURE — A9585 GADOBUTROL INJECTION: HCPCS | Performed by: EMERGENCY MEDICINE

## 2018-02-02 PROCEDURE — 93005 ELECTROCARDIOGRAM TRACING: CPT

## 2018-02-02 PROCEDURE — 96374 THER/PROPH/DIAG INJ IV PUSH: CPT

## 2018-02-02 PROCEDURE — 25000128 H RX IP 250 OP 636: Performed by: EMERGENCY MEDICINE

## 2018-02-02 PROCEDURE — 81001 URINALYSIS AUTO W/SCOPE: CPT | Performed by: EMERGENCY MEDICINE

## 2018-02-02 PROCEDURE — 25000131 ZZH RX MED GY IP 250 OP 636 PS 637: Performed by: EMERGENCY MEDICINE

## 2018-02-02 PROCEDURE — 70553 MRI BRAIN STEM W/O & W/DYE: CPT

## 2018-02-02 PROCEDURE — 99285 EMERGENCY DEPT VISIT HI MDM: CPT | Mod: 25

## 2018-02-02 PROCEDURE — 80053 COMPREHEN METABOLIC PANEL: CPT | Performed by: EMERGENCY MEDICINE

## 2018-02-02 RX ORDER — GADOBUTROL 604.72 MG/ML
11 INJECTION INTRAVENOUS ONCE
Status: COMPLETED | OUTPATIENT
Start: 2018-02-02 | End: 2018-02-02

## 2018-02-02 RX ORDER — MECLIZINE HYDROCHLORIDE 25 MG/1
25 TABLET ORAL EVERY 6 HOURS PRN
Qty: 30 TABLET | Refills: 0 | Status: SHIPPED | OUTPATIENT
Start: 2018-02-02 | End: 2018-03-23

## 2018-02-02 RX ORDER — METHYLPREDNISOLONE 4 MG
TABLET, DOSE PACK ORAL
Qty: 21 TABLET | Refills: 0 | Status: SHIPPED | OUTPATIENT
Start: 2018-02-02 | End: 2018-03-23

## 2018-02-02 RX ORDER — ONDANSETRON 4 MG/1
4 TABLET, ORALLY DISINTEGRATING ORAL EVERY 6 HOURS PRN
Qty: 20 TABLET | Refills: 0 | Status: SHIPPED | OUTPATIENT
Start: 2018-02-02 | End: 2018-03-23

## 2018-02-02 RX ORDER — ONDANSETRON 2 MG/ML
4 INJECTION INTRAMUSCULAR; INTRAVENOUS EVERY 30 MIN PRN
Status: DISCONTINUED | OUTPATIENT
Start: 2018-02-02 | End: 2018-02-02 | Stop reason: HOSPADM

## 2018-02-02 RX ORDER — SODIUM CHLORIDE 9 MG/ML
1000 INJECTION, SOLUTION INTRAVENOUS CONTINUOUS
Status: DISCONTINUED | OUTPATIENT
Start: 2018-02-02 | End: 2018-02-02 | Stop reason: HOSPADM

## 2018-02-02 RX ORDER — MECLIZINE HYDROCHLORIDE 25 MG/1
25 TABLET ORAL ONCE
Status: COMPLETED | OUTPATIENT
Start: 2018-02-02 | End: 2018-02-02

## 2018-02-02 RX ORDER — ONDANSETRON 2 MG/ML
INJECTION INTRAMUSCULAR; INTRAVENOUS
Status: COMPLETED
Start: 2018-02-02 | End: 2018-02-02

## 2018-02-02 RX ADMIN — ONDANSETRON 4 MG: 2 INJECTION INTRAMUSCULAR; INTRAVENOUS at 18:13

## 2018-02-02 RX ADMIN — MECLIZINE 25 MG: 25 TABLET ORAL at 19:37

## 2018-02-02 RX ADMIN — SODIUM CHLORIDE 1000 ML: 9 INJECTION, SOLUTION INTRAVENOUS at 18:15

## 2018-02-02 RX ADMIN — GADOBUTROL 11 ML: 604.72 INJECTION INTRAVENOUS at 19:13

## 2018-02-02 ASSESSMENT — ENCOUNTER SYMPTOMS
WEAKNESS: 0
NECK PAIN: 0
FEVER: 0
COUGH: 0
VOMITING: 1
HEADACHES: 0
RHINORRHEA: 0
SORE THROAT: 0
DIZZINESS: 1
SINUS PRESSURE: 0
NUMBNESS: 0
NAUSEA: 1

## 2018-02-02 NOTE — MR AVS SNAPSHOT
After Visit Summary   2/2/2018    Romelia Crystal    MRN: [id number]           Patient Information     Date Of Birth          1965        Visit Information        Provider Department      2/2/2018 1:00 PM Nikky Samuels APRN CNP [facility]        Today's Diagnoses     Dizziness    -  1    Non-intractable vomiting with nausea, unspecified vomiting type           Follow-ups after your visit        Follow-up notes from your care team     Return if symptoms worsen or fail to improve.      Your next 10 appointments already scheduled     Mar 08, 2018  8:30 PM CST   PSG Split with BED 2 SH SLEEP   [facility])    [address]  [facility] [id number] [phone]            Mar 23, 2018 10:30 AM CDT   Return Sleep Patient with Bennett Ezra Goltz, PA-C   [facility])    [address] [id number] [phone]              Who to contact     If you have questions or need follow up information about today's clinic visit or your schedule please contact [facility] directly at [phone].  Normal or non-critical lab and imaging results will be communicated to you by MyChart, letter or phone within 4 business days after the clinic has received the results. If you do not hear from us within 7 days, please contact the clinic through MyChart or phone. If you have a critical or abnormal lab result, we will notify you by phone as soon as possible.  Submit refill requests through [employer] or call your pharmacy and they will forward the refill request to us. Please allow 3 business days for your refill to be completed.          Additional Information About Your Visit        [employer] Information     [employer] gives you secure access to your electronic health record. If you see a primary care provider, you can also send messages to your care team  "and make appointments. If you have questions, please call your primary care clinic.  If you do not have a primary care provider, please call [phone] and they will assist you.        Care EveryWhere ID     This is your Care EveryWhere ID. This could be used by other organizations to access your [facility] medical records  [id number]        Your Vitals Were     Pulse Temperature Height Pulse Oximetry BMI (Body Mass Index)       80 96.9  F (36.1  C) (Oral) 5' 10\" (1.778 m) 99% 35.15 kg/m2        Blood Pressure from Last 3 Encounters:   02/02/18 (!) 134/97   01/25/18 129/85   01/10/18 134/89    Weight from Last 3 Encounters:   02/02/18 245 lb (111.1 kg)   01/25/18 247 lb (112 kg)   01/10/18 245 lb 8 oz (111.4 kg)              Today, you had the following     No orders found for display         Today's Medication Changes          These changes are accurate as of 2/2/18  2:10 PM.  If you have any questions, ask your nurse or doctor.               These medicines have changed or have updated prescriptions.        Dose/Directions    * albuterol (2.5 MG/3ML) 0.083% neb solution   This may have changed:  Another medication with the same name was changed. Make sure you understand how and when to take each.        Dose:  1 vial   Take 1 vial by nebulization every 6 hours as needed for shortness of breath / dyspnea or wheezing   Refills:  0       * albuterol 108 (90 BASE) MCG/ACT Inhaler   Commonly known as:  PROAIR HFA/PROVENTIL HFA/VENTOLIN HFA   This may have changed:    - when to take this  - reasons to take this   Used for:  Mild persistent asthma, uncomplicated        Dose:  2 puff   Inhale 2 puffs into the lungs every 6 hours   Quantity:  1 Inhaler   Refills:  6       loratadine 10 MG tablet   Commonly known as:  CLARITIN   This may have changed:    - when to take this  - reasons to take this   Used for:  Allergic rhinitis, mild        Dose:  10 mg   Take 1 tablet (10 mg) by mouth daily   Quantity:  30 tablet "   Refills:  0       * Notice:  This list has 2 medication(s) that are the same as other medications prescribed for you. Read the directions carefully, and ask your doctor or other care provider to review them with you.             Primary Care Provider Office Phone # Fax #    Sandra Guardado PA-C [phone] [address] [facility] [address]  [facility] [address]        Equal Access to Services     [facility] : Hadii aad ku hadasho Soomaali, waaxda luqadaha, qaybta kaalmada adeegyada, waxay idiin hayaan adeeg kharash la'aan . So [facility] [phone].    ATENCIÓN: Si andraela espedgardo, tiene a salgado disposición servicios gratuitos de asistencia lingüística. Llame al [phone].    We comply with applicable federal civil rights laws and [place] laws. We do not discriminate on the basis of race, color, national origin, age, disability, sex, sexual orientation, or gender identity.            Thank you!     Thank you for choosing [facility]  for your care. Our goal is always to provide you with excellent care. Hearing back from our patients is one way we can continue to improve our services. Please take a few minutes to complete the written survey that you may receive in the mail after your visit with us. Thank you!             Your Updated Medication List - Protect others around you: Learn how to safely use, store and throw away your medicines at www.disposemymeds.org.          This list is accurate as of 2/2/18  2:10 PM.  Always use your most recent med list.                   Brand Name Dispense Instructions for use Diagnosis    * albuterol (2.5 MG/3ML) 0.083% neb solution      Take 1 vial by nebulization every 6 hours as needed for shortness of breath / dyspnea or wheezing        * albuterol 108 (90 BASE) MCG/ACT Inhaler    PROAIR HFA/PROVENTIL HFA/VENTOLIN HFA    1 Inhaler    Inhale 2 puffs into the lungs every 6 hours    Mild persistent asthma, uncomplicated       amLODIPine 5 MG tablet    NORVASC     90 tablet    Take 1 tablet (5 mg) by mouth daily    Essential hypertension, benign       buPROPion 300 MG 24 hr tablet    WELLBUTRIN XL    90 tablet    Take 1 tablet (300 mg) by mouth every morning    Mild recurrent major depression (H)       fluticasone-salmeterol 250-50 MCG/DOSE diskus inhaler    ADVAIR    3 Inhaler    Inhale 1 puff into the lungs 2 times daily    Mild persistent asthma, uncomplicated       loratadine 10 MG tablet    CLARITIN    30 tablet    Take 1 tablet (10 mg) by mouth daily    Allergic rhinitis, mild       rosuvastatin 5 MG tablet    CRESTOR    90 tablet    Take 1 tablet by mouth once daily    Hyperlipidemia with target LDL less than 130       venlafaxine 75 MG 24 hr capsule    EFFEXOR-XR     Take 150 mg by mouth daily        * Notice:  This list has 2 medication(s) that are the same as other medications prescribed for you. Read the directions carefully, and ask your doctor or other care provider to review them with you.

## 2018-02-02 NOTE — NURSING NOTE
"Chief Complaint   Patient presents with     Dizziness       Initial BP (!) 134/97 (BP Location: Left arm, Patient Position: Sitting, Cuff Size: Adult Regular)  Pulse 80  Temp 96.9  F (36.1  C) (Oral)  Ht 5' 10\" (1.778 m)  Wt 245 lb (111.1 kg)  SpO2 99%  BMI 35.15 kg/m2 Estimated body mass index is 35.15 kg/(m^2) as calculated from the following:    Height as of this encounter: 5' 10\" (1.778 m).    Weight as of this encounter: 245 lb (111.1 kg).  Medication Reconciliation: complete   Sania Lanesborough- CMA      "

## 2018-02-02 NOTE — PROGRESS NOTES
SUBJECTIVE:                                                    Romelia Crystal is a 52 year old male who presents to clinic today for the following health issues:        Vertigo/Dizziness      Duration: sudden presentation of dizziness and vomiting standing up from a water fountain lasat night    Description (location/character/radiation): constant severe dizziness, spinning, nausea, vomiting,can't focus, sensitivity to light, decreased fluid intake, difficulty balancing, no loss of speech    Intensity:  severe    Accompanying signs and symptoms: hot/cold flashes flushed    History (similar episodes/previous evaluation): None.  Does not recall that he has had this in the past.  Has never had work up or treatment for vertigo    Precipitating or alleviating factors: any movement.  No recent URI, no headache or paresthesias    Therapies tried and outcome: None       Problem list and histories reviewed & adjusted, as indicated.  Additional history: as documented    Patient Active Problem List   Diagnosis     Hyperlipidemia with target LDL less than 130     Mild recurrent major depression (H)     Allergic rhinitis, mild     Hives     BPPV (benign paroxysmal positional vertigo)     Eczema     Essential hypertension, benign     Moderate persistent asthma, uncomplicated     Decreased libido     Diverticulitis     Past Surgical History:   Procedure Laterality Date     TONSILLECTOMY       wisdom teeth         Social History   Substance Use Topics     Smoking status: Never Smoker     Smokeless tobacco: Never Used     Alcohol use 0.0 oz/week     0 Standard drinks or equivalent per week      Comment: few beers on weekend     Family History   Problem Relation Age of Onset     Lipids Father      CANCER Father 50     lymphoma     Family History Negative Mother 74     Other - See Comments Brother      diverticulitis         Current Outpatient Prescriptions   Medication Sig Dispense Refill     albuterol (2.5 MG/3ML) 0.083% neb  "solution Take 1 vial by nebulization every 6 hours as needed for shortness of breath / dyspnea or wheezing       rosuvastatin (CRESTOR) 5 MG tablet Take 1 tablet by mouth once daily 90 tablet 0     amLODIPine (NORVASC) 5 MG tablet Take 1 tablet (5 mg) by mouth daily 90 tablet 3     fluticasone-salmeterol (ADVAIR) 250-50 MCG/DOSE diskus inhaler Inhale 1 puff into the lungs 2 times daily 3 Inhaler 3     albuterol (PROAIR HFA/PROVENTIL HFA/VENTOLIN HFA) 108 (90 BASE) MCG/ACT Inhaler Inhale 2 puffs into the lungs every 6 hours (Patient taking differently: Inhale 2 puffs into the lungs every 6 hours as needed ) 1 Inhaler 6     loratadine (CLARITIN) 10 MG tablet Take 1 tablet (10 mg) by mouth daily (Patient taking differently: Take 10 mg by mouth as needed ) 30 tablet 0     buPROPion (WELLBUTRIN XL) 300 MG 24 hr tablet Take 1 tablet (300 mg) by mouth every morning 90 tablet 1     venlafaxine (EFFEXOR-XR) 75 MG 24 hr capsule Take 150 mg by mouth daily        Allergies   Allergen Reactions     Hmg-Coa-R Inhibitors Other (See Comments)     Some Statins Muscle aches      Lisinopril Cough       ROS:  Constitutional, HEENT, cardiovascular, pulmonary, gi and gu systems are negative, except as otherwise noted.    OBJECTIVE:     BP (!) 134/97 (BP Location: Left arm, Patient Position: Sitting, Cuff Size: Adult Regular)  Pulse 80  Temp 96.9  F (36.1  C) (Oral)  Ht 5' 10\" (1.778 m)  Wt 245 lb (111.1 kg)  SpO2 99%  BMI 35.15 kg/m2  Body mass index is 35.15 kg/(m^2).  GENERAL:severe distress, sitting still with head bowed and eyes closed and emesis bag in hand    Diagnostic Test Results:  none     ASSESSMENT/PLAN:         ICD-10-CM    1. Dizziness R42    2. Non-intractable vomiting with nausea, unspecified vomiting type R11.2    Mr Crystal is escorted to ED in wheelchair for evaluation and treatment  Report called to Dr Justino Samuels, [facility]"

## 2018-02-02 NOTE — LETTER
February 2, 2018      To Whom It May Concern:      Romelia Crystal was seen in our Emergency Department today, 02/02/18.  I expect his condition to improve over the next 2-3 days.  He may return to work when improved.    Sincerely,        Willis Vega MD

## 2018-02-02 NOTE — ED PROVIDER NOTES
History     Chief Complaint:  Dizziness      HPI   Romelia Crystal is a 52 year old male who presents with dizziness. The patient reports that he was in the hospital about 5 weeks ago for Diverticulitis but this has been under control since he was discharged. The patient states that he was at [employer] last night at 9pm and working the sound board when he began to feel shaky, initially thinking that it was because he was hungry. About 30 minutes later, the patient was leaving [employer] with his wife and first leaned over to get a drink of water from the drinking fountain. Upon standing back up straight he felt a sudden onset of severe dizziness and when he was walking out of [employer] he nearly fell over due to the dizziness. Last night, just before bed, the patient walked up the stairs and vomited almost immediately after reaching the top, but has not vomited since. The patient reports that his dizziness has been constant since onset yesterday, with turning his head and opening his eyes aggravating the dizziness and sleeping or laying down with his eyes closed alleviating the dizziness. He describes the dizziness as feeling like the room is spinning and notes that he has trouble focusing when his eyes are open but he can still see okay. The patient presented to his primary care physician for his symptoms today but was sent here for further testing and evaluation. He notes associated nausea currently along with the persistent dizziness. The patient states that if he were to try and walk right now he feels like he would either vomit or fall over. He denies recent fever, infection, cold symptoms, ear pain or ringing, hearing changes, neck pain, or any recent falls or head injury. He also denies any weakness, numbness or tingling in his arms or legs. He states that nothing like this has ever happened to him before.    Allergies:  Hmg-Coa-R Inhibitors  Lisinopril    Medications:   "  Albuterol  Crestor  Norvasc  Advair  Proair  Claritin  Wellbutrin  Effexor    Past Medical History:    Diverticulitis  Moderate persistent asthma, uncomplicated  Decreased libido  Essential hypertension, benign  Eczema  Hived  Benign paroxysmal positional vertigo  Mild recurrent major depression  Hyperlipidemia with target LOL less than 130  Allergic rhinitis, mild    Past Surgical History:    Tonsillectomy  [place] teeth    Family History:    Lipids  Lymphoma  Diverticulitis    Social History:  Relationship status:   Tobacco use: Neg  Alcohol use: Pos (Beers on the weekend)  The patient presents with his wife.     Review of Systems   Constitutional: Negative for fever.   HENT: Negative for ear pain, rhinorrhea, sinus pressure, sore throat and tinnitus.    Eyes:        Positive for trouble focusing his vision.   Respiratory: Negative for cough.    Gastrointestinal: Positive for nausea and vomiting.   Musculoskeletal: Negative for neck pain.   Neurological: Positive for dizziness. Negative for weakness, numbness (Also negative for tingling) and headaches.   All other systems reviewed and are negative.    Physical Exam     Patient Vitals for the past 24 hrs:   BP Temp Temp src Pulse Heart Rate Resp SpO2 Height Weight   02/02/18 2000 (!) 146/94 - - 81 - 18 98 % - -   02/02/18 1800 (!) 155/109 - - - - - - - -   02/02/18 1745 - - - - - - 98 % - -   02/02/18 1730 (!) 145/102 - - - - - 98 % - -   02/02/18 1700 (!) 151/97 - - - - - - - -   02/02/18 1645 - - - - - - 98 % - -   02/02/18 1630 (!) 156/97 - - - - - 99 % - -   02/02/18 1612 - - - - - - 99 % - -   02/02/18 1610 (!) 156/103 - - - - - - - -   02/02/18 1427 (!) 142/95 97.6  F (36.4  C) Oral - 66 16 97 % 1.778 m (5' 10\") 111.1 kg (245 lb)     Physical Exam  General: Well appearing, nontoxic. Resting with eyes closed  Head:  Scalp, face, and head appear normal  Eyes:  Pupils are equal, round, and reactive to light    Rightward beating horizontal-rotational " nystagmus that is non-fatigable.  The nystagmus does not direction change. EOMI    Conjunctivae non-injected and sclerae white  ENT:    The external nose is normal    Pinnae are normal. Bilateral TMs clear without erythema, bulging, or effusion. Auditory canals normal.    The oropharynx is normal, mucous membranes moist. Posterior pharynx clear without swelling, exudates or erythema     Uvula is in the midline  Neck:  Normal range of motion    There is no rigidity noted    Trachea is in the midline  CV:  Regular rate and rhythm     Normal S1/S2, no S3/S4    No murmur or rub  Resp:  Lungs are clear and equal bilaterally    There is no tachypnea    No increased work of breathing    No rales, wheezing, or rhonchi  GI:  Abdomen is soft, no rigidity or guarding    No distension, or mass    No tenderness   MS:  Normal muscular tone    Symmetric motor strength    No lower extremity edema  Skin:  No rash or acute skin lesions noted  Neuro: A&Ox3, GCS 15    CN II - XII intact -nystagmus as noted above.    Speech clear, fluent, and normal    Strength 5/5 and symmetric in bilateral upper and lower extremities.    No pronator drift, no leg drift.    patellar reflexes 2+ and symmetric, no ankle clonus    FTN testing normal. No tremor.  Heel to shin testing normal.    No meningismus   Psych:  Normal affect.  Appropriate interactions.      Emergency Department Course   ECG @ 1655  Indication: Dizziness  Rate 59 bpm.   SD interval 204 ms.   QRS duration 100 ms.   QT/QTc 458/453 ms.   P-R-T axes 49.  Notes: Sinus bradycardia, Incomplete right bundle branch block, Borderline ECG.  Time read 1707     Imaging:  Radiographic findings were communicated with the patient who voiced understanding of the findings.    MR brain, with and without contrast, per radiology:   1. Normal brain MRI.  2. Normal MRI of the skull base and internal auditory canals bilaterally.    Laboratory:  CBC: WNL (WBC 8.4, HGB 15.3, )   CMP: Glucose 125 (H)  o/w WNL (Creatinine 0.79)   UA: Clear, yellow urine: Leukocyte Esterase Urine Trace (A), WBC/HPF 2-5 (A), Mucous Urine Present (A) o/w WNL  Urine Culture: Pending    Interventions:  1813: Zofran, 4 mg, IV  1815: Normal Saline, 1000 mL, IV  1913: Gadavist, 11 mL, IV  1937: Meclizine, 25 mg, PO    Emergency Department Course:  Nursing notes and vitals reviewed.  I performed an exam of the patient as documented above.  The above workup was undertaken.  1842: I rechecked the patient and discussed results. The patient was eating and feeling much better upon recheck.  Findings and plan explained to the Patient and spouse. Patient discharged home, status improved, with instructions regarding supportive care, medications, and reasons to return as well as the importance of close follow-up was reviewed.    Impression & Plan    Medical Decision Making:  Romelia Crystal is a 52 year old male who presents for evaluation of vertigo/dizziness. The differential diagnosis of vertigo is broad and includes common etiologies such as menières disease, labyrinthitis, vestibular neuritis, benign positional vertigo, otitis media, etc.  More serious etiologies considered include central etiologies such as tumor, intracerebral bleed, vertebral dissection, ischemic cerebral vascular accident.  The patient does have some risk factors for stroke.  Given the continuous, non-triggered, non-episodic vertigo with persistent non-fatigable nystagmus I felt that imaging was required to rule out a central cause for his symptoms.  Patient has no definite evidence of HEENT infection by history or exam.  His ear exam is otherwise unremarkable.  MRI of the brain was obtained which reveals no acute findings such as stroke, tumor, bleeding.  He has no neck pain or history to suggest acute vertebral artery dissection.  Patient feels improved after interventions noted above and was able to ambulate without difficulty.   Further outpatient management is  indicated with medications.  I feel the likely etiology for the patient's symptoms would be labyrinthitis or acute vestibular neuritis.  Patient will be started on Medrol Dosepak, meclizine, Zofran.  He was instructed to follow-up closely with his primary care provider as well as ENT.  Vertigo precautions were given for home. Return precautions were discussed with patient. The patient's questions were answered and the patient was agreeable with discharge.    Diagnosis:    ICD-10-CM    1. Vestibular neuritis, left H81.22 UA reflex to Microscopic and Culture     Urine Microscopic     Urine Microscopic     Disposition:  Discharged to home with Medrol Dosepak, Meclizine and Zofran ODT.    Discharge Medications:  methylPREDNISolone (MEDROL DOSEPAK) 4 MG tablet Follow package instructions, Disp-21 tablet, R-0, Local Print   meclizine (ANTIVERT) 25 MG tablet Take 1 tablet (25 mg) by mouth every 6 hours as needed for dizziness, Disp-30 tablet, R-0, Local Print   ondansetron (ZOFRAN ODT) 4 MG ODT tab Take 1 tablet (4 mg) by mouth every 6 hours as needed for nausea or vomiting, Disp-20 tablet, R-0, Local Print     Becka SIDHU, am serving as a scribe on 2/2/2018 at 5:08 PM to personally document services performed by Willis Vega MD, based on my observations and the provider's statements to me.     EMERGENCY DEPARTMENT       Willis Vega MD  02/03/18 1257

## 2018-02-02 NOTE — ED AVS SNAPSHOT
Emergency Department    [address] [facility] [address]    Phone:  [phone]    Fax:  [phone]                                       Romelia Crystal   MRN: [id number]    Department:   Emergency Department   Date of Visit:  2/2/2018           Patient Information     Date Of Birth          1965        Your diagnoses for this visit were:     Vestibular neuritis, left        You were seen by Willis Vega MD.      Follow-up Information     Follow up with Sandra Guardado PA-C. Schedule an appointment as soon as possible for a visit in 3 days.    Specialty:  Internal Medicine    Why:  For close follow up    Contact information:    [address] [facility] [address]  [facility] [address]  [phone]          Follow up with Sandra Bacon MD. Schedule an appointment as soon as possible for a visit in 1 week.    Specialty:  Otolaryngology    Why:  For close follow up with Ear specialist    Contact information:    [facility]  [address] [facility] [address]  [facility] [address]  [phone]          Discharge Instructions         Labyrinthitis    Labyrinthitis is the inflammation of part of the inner ear called the labyrinth. It usually affects only one ear. A nerve in the head called the eighth cranial nerve may also be inflamed. Labyrinthitis causes a sense of spinning and hearing loss. In most people these go away over time.  Understanding the inner ear  The inner ear has a system of fluid-filled tubes and sacs. This system is called the labyrinth. Inside the inner ear, the cochlea senses sound. The vestibular organs take in sense motion and changes in space. These create your sense of balance. The eighth cranial nerve (vestibulocochlear nerve) sends all of this information from the inner ear to the brain.  When one of the nerves or the labyrinth is infected, it can become inflamed. This can cause it to not work normally. It may cause hearing loss in 1 ear. The brain now has to make sense  of the information that doesn't match between the normal nerve and the infected one. This causes a feeling that the world is spinning around you (vertigo).  What causes labyrinthitis?  A viral infection may cause the condition. The virus may have spread throughout your body. Or it may only affect the labyrinth and eighth cranial nerve. Usually only 1 nerve is affected. Viruses that can cause labyrinthitis include:    Herpes viruses    Influenza    Measles    Mumps    Rubella    Polio    Hepatitis    Lisa-Porras    Varicella  Chronic bacterial infections of the middle ear can spread to the inner ear and cause labyrinthitis. In rare cases bacterial meningitis or head trauma may cause labyrinthitis. In other cases the cause of labyrinthitis is not known.  Symptoms of labyrinthitis  Symptoms of labyrinthitis of may include:    A feeling of spinning (vertigo)    Dizziness    Lack of balance when walking    Nausea and vomiting    Trouble concentrating    Back-and-forth eye movements that you can't control (nystagmus)    Hearing loss    Ringing in the ears  Symptoms might range from mild to severe. They may come on very quickly. In many people, these symptoms go away over several weeks. Or symptoms can last longer.  Diagnosing labyrinthitis  Your health care provider will ask about your past health. You may also have a physical exam. This may include hearing and balance tests. It will also include an exam of your nervous system. There are no tests for labyrinthitis. But your health care provider may have you take an imaging test. Many health conditions can cause dizziness and vertigo. Your health care provider will need to make sure you don't have another condition that causes these symptoms, such as stroke.  You may have tests such as:    MRI, to check for a stroke    Electrocardiogram (ECG), to check for cardiovascular causes    Electronystagmography (ENG) or videonystagmography (VNG). Either of these records your eye  movement to find where the problem is in your vestibular system. These tests can find the cause of a balance disorder.  Treatment for labyrinthitis  Treatment depends on your overall health and symptoms. Treatment for labyrinthitis may include:    Corticosteroids to help reduce inflammation of the nerve    Antiviral medicines    Antibiotics if you have signs of a bacterial infection    Medicines to take for a short time that control nausea and dizziness, such as diphenhydramine or lorazepam  If your symptoms go away in a few weeks, you likely won't need other treatment. If you have symptoms that don't go away, you may need to do certain exercises. These are known as vestibular rehabilitation exercises. They are a form of physical therapy. These exercises may help your brain learn to adjust to the vestibular imbalance.  Possible complications of labyrinthitis  In most cases labyrinthitis does not cause any complications. In rare cases it may permanently damage the eighth cranial nerve. This can cause lasting problems with balance, and partial or total loss of hearing. This may require you to use a hearing aid. Getting treated right away can help reduce your risk for these complications.     When to call the health care provider  Call your health care provider right away if you have any of these:    Symptoms that get worse, or don't get better with treatment    New symptoms    Date Last Reviewed: 4/22/2015 2000-2017 [employer]. [address] [place], [place] [address]. All rights reserved. This information is not intended as a substitute for professional medical care. Always follow your healthcare professional's instructions.          Future Appointments        Provider Department Dept Phone Center    3/8/2018 8:30 PM BED 2 SH SLEEP [facility] [facility] [phone] [place]    3/23/2018 10:30 AM Bennett Ezra Goltz, PA-C, JERAD [facility] [phone] [place]       24 Hour Appointment Hotline       To make an appointment at any [facility], call [phone] ([phone]). If you don't have a family doctor or clinic, we will help you find one. [facility] clinics are conveniently located to serve the needs of you and your family.             Review of your medicines      START taking        Dose / Directions Last dose taken    meclizine 25 MG tablet   Commonly known as:  ANTIVERT   Dose:  25 mg   Quantity:  30 tablet        Take 1 tablet (25 mg) by mouth every 6 hours as needed for dizziness   Refills:  0        methylPREDNISolone 4 MG tablet   Commonly known as:  MEDROL DOSEPAK   Quantity:  21 tablet        Follow package instructions   Refills:  0        ondansetron 4 MG ODT tab   Commonly known as:  ZOFRAN ODT   Dose:  4 mg   Quantity:  20 tablet        Take 1 tablet (4 mg) by mouth every 6 hours as needed for nausea or vomiting   Refills:  0          Our records show that you are taking the medicines listed below. If these are incorrect, please call your family doctor or clinic.        Dose / Directions Last dose taken    * albuterol (2.5 MG/3ML) 0.083% neb solution   Dose:  1 vial        Take 1 vial by nebulization every 6 hours as needed for shortness of breath / dyspnea or wheezing   Refills:  0        * albuterol 108 (90 BASE) MCG/ACT Inhaler   Commonly known as:  PROAIR HFA/PROVENTIL HFA/VENTOLIN HFA   Dose:  2 puff   Quantity:  1 Inhaler        Inhale 2 puffs into the lungs every 6 hours   Refills:  6        amLODIPine 5 MG tablet   Commonly known as:  NORVASC   Dose:  5 mg   Quantity:  90 tablet        Take 1 tablet (5 mg) by mouth daily   Refills:  3        buPROPion 300 MG 24 hr tablet   Commonly known as:  WELLBUTRIN XL   Dose:  300 mg   Quantity:  90 tablet        Take 1 tablet (300 mg) by mouth every morning   Refills:  1        fluticasone-salmeterol 250-50 MCG/DOSE diskus inhaler   Commonly known as:  ADVAIR   Dose:  1 puff   Quantity:  3 Inhaler         Inhale 1 puff into the lungs 2 times daily   Refills:  3        loratadine 10 MG tablet   Commonly known as:  CLARITIN   Dose:  10 mg   Quantity:  30 tablet        Take 1 tablet (10 mg) by mouth daily   Refills:  0        rosuvastatin 5 MG tablet   Commonly known as:  CRESTOR   Quantity:  90 tablet        Take 1 tablet by mouth once daily   Refills:  0        venlafaxine 75 MG 24 hr capsule   Commonly known as:  EFFEXOR-XR   Dose:  150 mg        Take 150 mg by mouth daily   Refills:  0        * Notice:  This list has 2 medication(s) that are the same as other medications prescribed for you. Read the directions carefully, and ask your doctor or other care provider to review them with you.            Prescriptions were sent or printed at these locations (3 Prescriptions)                   Other Prescriptions                Printed at Department/Unit printer (3 of 3)         methylPREDNISolone (MEDROL DOSEPAK) 4 MG tablet               meclizine (ANTIVERT) 25 MG tablet               ondansetron (ZOFRAN ODT) 4 MG ODT tab                Procedures and tests performed during your visit     Procedure/Test Number of Times Performed    CBC with platelets differential 1    Comprehensive metabolic panel 1    EKG 12-lead, tracing only 1    MR Brain w/o & w Contrast 1    Peripheral IV catheter 2    UA reflex to Microscopic and Culture 1      Orders Needing Specimen Collection     None      Pending Results     Date and Time Order Name Status Description    2/2/2018 1931 UA reflex to Microscopic and Culture In process     2/2/2018 1717 MR Brain w/o & w Contrast Preliminary             Pending Culture Results     Date and Time Order Name Status Description    2/2/2018 1931 UA reflex to Microscopic and Culture In process             Pending Results Instructions     If you had any lab results that were not finalized at the time of your Discharge, you can call the ED Lab Result RN at [phone]. You will be contacted by this team  for any positive Lab results or changes in treatment. The nurses are available 7 days a week from 10A to 6:30P.  You can leave a message 24 hours per day and they will return your call.        Test Results From Your Hospital Stay        2/2/2018  4:33 PM      Component Results     Component Value Ref Range & Units Status    WBC 8.4 4.0 - 11.0 10e9/L Final    RBC Count 5.08 4.4 - 5.9 10e12/L Final    Hemoglobin 15.3 13.3 - 17.7 g/dL Final    Hematocrit 43.9 40.0 - 53.0 % Final    MCV 86 78 - 100 fl Final    MCH 30.1 26.5 - 33.0 pg Final    MCHC 34.9 31.5 - 36.5 g/dL Final    RDW 13.4 10.0 - 15.0 % Final    Platelet Count 225 150 - 450 10e9/L Final    Diff Method Automated Method  Final    % Neutrophils 82.1 % Final    % Lymphocytes 11.3 % Final    % Monocytes 4.2 % Final    % Eosinophils 1.7 % Final    % Basophils 0.5 % Final    % Immature Granulocytes 0.2 % Final    Nucleated RBCs 0 0 /100 Final    Absolute Neutrophil 6.9 1.6 - 8.3 10e9/L Final    Absolute Lymphocytes 1.0 0.8 - 5.3 10e9/L Final    Absolute Monocytes 0.4 0.0 - 1.3 10e9/L Final    Absolute Eosinophils 0.1 0.0 - 0.7 10e9/L Final    Absolute Basophils 0.0 0.0 - 0.2 10e9/L Final    Abs Immature Granulocytes 0.0 0 - 0.4 10e9/L Final    Absolute Nucleated RBC 0.0  Final         2/2/2018  4:55 PM      Component Results     Component Value Ref Range & Units Status    Sodium 138 133 - 144 mmol/L Final    Potassium 4.1 3.4 - 5.3 mmol/L Final    Chloride 106 94 - 109 mmol/L Final    Carbon Dioxide 28 20 - 32 mmol/L Final    Anion Gap 4 3 - 14 mmol/L Final    Glucose 125 (H) 70 - 99 mg/dL Final    Urea Nitrogen 13 7 - 30 mg/dL Final    Creatinine 0.79 0.66 - 1.25 mg/dL Final    GFR Estimate >90 >60 mL/min/1.7m2 Final    Non  GFR Calc    GFR Estimate If Black >90 >60 mL/min/1.7m2 Final    African American GFR Calc    Calcium 9.3 8.5 - 10.1 mg/dL Final    Bilirubin Total 0.5 0.2 - 1.3 mg/dL Final    Albumin 3.8 3.4 - 5.0 g/dL Final    Protein Total  7.3 6.8 - 8.8 g/dL Final    Alkaline Phosphatase 86 40 - 150 U/L Final    ALT 37 0 - 70 U/L Final    AST 25 0 - 45 U/L Final         2/2/2018  7:22 PM      Narrative     MRI OF THE BRAIN WITHOUT AND WITH CONTRAST; MRI OF THE SKULL BASE  WITHOUT AND WITH CONTRAST 2/2/2018 7:13 PM     COMPARISON: None.    HISTORY: Continuous dizziness, nonfatigued vertigo.    TECHNIQUE: Axial diffusion-weighted with ADC map, T2-weighted,  turboFLAIR and T1-weighted images of the brain and axial T1-weighted  and coronal T2-weighted with fat saturation images centered on the  internal auditory canals were obtained without intravenous contrast.  Following intravenous administration of IV gadolinium (11mL Gadavist),  axial turboFLAIR images of the brain and axial T1-weighted with fat  saturation and coronal T1-weighted images, centered on the internal  auditory canals, were obtained.    FINDINGS: The ventricles and basal cisterns are normal in  configuration. There is no midline shift. There are no extra-axial  fluid collections. Gray-white differentiation is well maintained.  There is no evidence for stroke or acute intracranial hemorrhage.  There is no abnormal contrast enhancement in the brain or its  coverings.    The contents of the internal auditory canals are within normal limits  in contour and signal intensity with no abnormal contrast enhancement.   The labyrinthine structures of the inner ears bilaterally are normal  in contour and signal intensity with no abnormal contrast enhancement.  There is no evidence for cerebellopontine angle cistern mass or  vascular lesion on either side.    There is no sinusitis or mastoiditis.        Impression     IMPRESSION:   1. Normal brain MRI.  2. Normal MRI of the skull base and internal auditory canals  bilaterally.         2/2/2018  7:37 PM                Clinical Quality Measure: Blood Pressure Screening     Your blood pressure was checked while you were in the emergency department today.  The last reading we obtained was  BP: (!) 155/109 . Please read the guidelines below about what these numbers mean and what you should do about them.  If your systolic blood pressure (the top number) is less than 120 and your diastolic blood pressure (the bottom number) is less than 80, then your blood pressure is normal. There is nothing more that you need to do about it.  If your systolic blood pressure (the top number) is 120-139 or your diastolic blood pressure (the bottom number) is 80-89, your blood pressure may be higher than it should be. You should have your blood pressure rechecked within a year by a primary care provider.  If your systolic blood pressure (the top number) is 140 or greater or your diastolic blood pressure (the bottom number) is 90 or greater, you may have high blood pressure. High blood pressure is treatable, but if left untreated over time it can put you at risk for heart attack, stroke, or kidney failure. You should have your blood pressure rechecked by a primary care provider within the next 4 weeks.  If your provider in the emergency department today gave you specific instructions to follow-up with your doctor or provider even sooner than that, you should follow that instruction and not wait for up to 4 weeks for your follow-up visit.        Thank you for choosing [facility]       Thank you for choosing [facility] for your care. Our goal is always to provide you with excellent care. Hearing back from our patients is one way we can continue to improve our services. Please take a few minutes to complete the written survey that you may receive in the mail after you visit with us. Thank you!        [employer] Information     [email or web address] gives you secure access to your electronic health record. If you see a primary care provider, you can also send messages to your care team and make appointments. If you have questions, please call your primary care clinic.  If you do not have a primary care provider,  please call [phone] and they will assist you.        Care EveryWhere ID     This is your Care EveryWhere ID. This could be used by other organizations to access your [facility] medical records  [id number]        Equal Access to Services     SANDEEP GRANADOS : Amanda Cuadra, waxander jo, qaybta kaalmada sam, herber hernandez. So [facility] [phone].    ATENCIÓN: Si habla español, tiene a salgado disposición servicios gratuitos de asistencia lingüística. Llame al [phone].    We comply with applicable federal civil rights laws and [place] laws. We do not discriminate on the basis of race, color, national origin, age, disability, sex, sexual orientation, or gender identity.            After Visit Summary       This is your record. Keep this with you and show to your community pharmacist(s) and doctor(s) at your next visit.

## 2018-02-02 NOTE — ED AVS SNAPSHOT
Emergency Department    [address] [facility] [address]    Phone:  [phone]    Fax:  [phone]                                       Romelia Crystal   MRN: [id number]    Department:   Emergency Department   Date of Visit:  2/2/2018           After Visit Summary Signature Page     I have received my discharge instructions, and my questions have been answered. I have discussed any challenges I see with this plan with the nurse or doctor.    ..........................................................................................................................................  Patient/Patient Representative Signature      ..........................................................................................................................................  Patient Representative Print Name and Relationship to Patient    ..................................................               ................................................  Date                                            Time    ..........................................................................................................................................  Reviewed by Signature/Title    ...................................................              ..............................................  Date                                                            Time

## 2018-02-03 NOTE — DISCHARGE INSTRUCTIONS
Labyrinthitis    Labyrinthitis is the inflammation of part of the inner ear called the labyrinth. It usually affects only one ear. A nerve in the head called the eighth cranial nerve may also be inflamed. Labyrinthitis causes a sense of spinning and hearing loss. In most people these go away over time.  Understanding the inner ear  The inner ear has a system of fluid-filled tubes and sacs. This system is called the labyrinth. Inside the inner ear, the cochlea senses sound. The vestibular organs take in sense motion and changes in space. These create your sense of balance. The eighth cranial nerve (vestibulocochlear nerve) sends all of this information from the inner ear to the brain.  When one of the nerves or the labyrinth is infected, it can become inflamed. This can cause it to not work normally. It may cause hearing loss in 1 ear. The brain now has to make sense of the information that doesn't match between the normal nerve and the infected one. This causes a feeling that the world is spinning around you (vertigo).  What causes labyrinthitis?  A viral infection may cause the condition. The virus may have spread throughout your body. Or it may only affect the labyrinth and eighth cranial nerve. Usually only 1 nerve is affected. Viruses that can cause labyrinthitis include:    Herpes viruses    Influenza    Measles    Mumps    Rubella    Polio    Hepatitis    Lisa-Porras    Varicella  Chronic bacterial infections of the middle ear can spread to the inner ear and cause labyrinthitis. In rare cases bacterial meningitis or head trauma may cause labyrinthitis. In other cases the cause of labyrinthitis is not known.  Symptoms of labyrinthitis  Symptoms of labyrinthitis of may include:    A feeling of spinning (vertigo)    Dizziness    Lack of balance when walking    Nausea and vomiting    Trouble concentrating    Back-and-forth eye movements that you can't control (nystagmus)    Hearing loss    Ringing in the  ears  Symptoms might range from mild to severe. They may come on very quickly. In many people, these symptoms go away over several weeks. Or symptoms can last longer.  Diagnosing labyrinthitis  Your health care provider will ask about your past health. You may also have a physical exam. This may include hearing and balance tests. It will also include an exam of your nervous system. There are no tests for labyrinthitis. But your health care provider may have you take an imaging test. Many health conditions can cause dizziness and vertigo. Your health care provider will need to make sure you don't have another condition that causes these symptoms, such as stroke.  You may have tests such as:    MRI, to check for a stroke    Electrocardiogram (ECG), to check for cardiovascular causes    Electronystagmography (ENG) or videonystagmography (VNG). Either of these records your eye movement to find where the problem is in your vestibular system. These tests can find the cause of a balance disorder.  Treatment for labyrinthitis  Treatment depends on your overall health and symptoms. Treatment for labyrinthitis may include:    Corticosteroids to help reduce inflammation of the nerve    Antiviral medicines    Antibiotics if you have signs of a bacterial infection    Medicines to take for a short time that control nausea and dizziness, such as diphenhydramine or lorazepam  If your symptoms go away in a few weeks, you likely won't need other treatment. If you have symptoms that don't go away, you may need to do certain exercises. These are known as vestibular rehabilitation exercises. They are a form of physical therapy. These exercises may help your brain learn to adjust to the vestibular imbalance.  Possible complications of labyrinthitis  In most cases labyrinthitis does not cause any complications. In rare cases it may permanently damage the eighth cranial nerve. This can cause lasting problems with balance, and partial or  total loss of hearing. This may require you to use a hearing aid. Getting treated right away can help reduce your risk for these complications.     When to call the health care provider  Call your health care provider right away if you have any of these:    Symptoms that get worse, or don't get better with treatment    New symptoms    Date Last Reviewed: 4/22/2015 2000-2017 [employer]. [address] [place] [address], [place] [address]. All rights reserved. This information is not intended as a substitute for professional medical care. Always follow your healthcare professional's instructions.

## 2018-02-03 NOTE — ED NOTES
Upon entering the room patient and family are eating take-out from [employer]. Nausea is better. Patient has eyes open. Gave dose of meclizine as ordered

## 2018-03-08 ENCOUNTER — THERAPY VISIT (OUTPATIENT)
Dept: SLEEP MEDICINE | Facility: CLINIC | Age: 53
End: 2018-03-08
Payer: COMMERCIAL

## 2018-03-08 DIAGNOSIS — G47.33 OSA (OBSTRUCTIVE SLEEP APNEA): ICD-10-CM

## 2018-03-08 PROCEDURE — 95811 POLYSOM 6/>YRS CPAP 4/> PARM: CPT | Performed by: INTERNAL MEDICINE

## 2018-03-08 NOTE — MR AVS SNAPSHOT
After Visit Summary   3/8/2018    Romelia Crystal    MRN: [id number]           Patient Information     Date Of Birth          1965        Visit Information        Provider Department      3/8/2018 8:30 PM BED 2  SLEEP [facility]        Today's Diagnoses     ADALGISA (obstructive sleep apnea)           Follow-ups after your visit        Your next 10 appointments already scheduled     Mar 23, 2018 10:30 AM CDT   Return Sleep Patient with Bennett Ezra Goltz, PA-C   [facility] ([facility])    [address] [id number] [phone]              Who to contact     If you have questions or need follow up information about today's clinic visit or your schedule please contact [facility] directly at [phone].  Normal or non-critical lab and imaging results will be communicated to you by [facility] [employer], letter or phone within 4 business days after the clinic has received the results. If you do not hear from us within 7 days, please contact the clinic through [facility] [employer] or phone. If you have a critical or abnormal lab result, we will notify you by phone as soon as possible.  Submit refill requests through [employer] or call your pharmacy and they will forward the refill request to us. Please allow 3 business days for your refill to be completed.          Additional Information About Your Visit        MyChart Information     [employer] gives you secure access to your electronic health record. If you see a primary care provider, you can also send messages to your care team and make appointments. If you have questions, please call your primary care clinic.  If you do not have a primary care provider, please call [phone] and they will assist you.        Care EveryWhere ID     This is your Care EveryWhere ID. This could be used by other organizations to access your [facility] medical records  [id number]         Blood  Pressure from Last 3 Encounters:   02/02/18 (!) 146/94   02/02/18 (!) 134/97   01/25/18 129/85    Weight from Last 3 Encounters:   02/02/18 111.1 kg (245 lb)   02/02/18 111.1 kg (245 lb)   01/25/18 112 kg (247 lb)              We Performed the Following     Comprehensive Sleep Study          Today's Medication Changes          These changes are accurate as of 3/8/18 11:59 PM.  If you have any questions, ask your nurse or doctor.               These medicines have changed or have updated prescriptions.        Dose/Directions    * albuterol (2.5 MG/3ML) 0.083% neb solution   This may have changed:  Another medication with the same name was changed. Make sure you understand how and when to take each.        Dose:  1 vial   Take 1 vial by nebulization every 6 hours as needed for shortness of breath / dyspnea or wheezing   Refills:  0       * albuterol 108 (90 BASE) MCG/ACT Inhaler   Commonly known as:  PROAIR HFA/PROVENTIL HFA/VENTOLIN HFA   This may have changed:    - when to take this  - reasons to take this   Used for:  Mild persistent asthma, uncomplicated        Dose:  2 puff   Inhale 2 puffs into the lungs every 6 hours   Quantity:  1 Inhaler   Refills:  6       loratadine 10 MG tablet   Commonly known as:  CLARITIN   This may have changed:    - when to take this  - reasons to take this   Used for:  Allergic rhinitis, mild        Dose:  10 mg   Take 1 tablet (10 mg) by mouth daily   Quantity:  30 tablet   Refills:  0       * Notice:  This list has 2 medication(s) that are the same as other medications prescribed for you. Read the directions carefully, and ask your doctor or other care provider to review them with you.             Primary Care Provider Office Phone # Fax #    Sandra Guardado PA-C [phone] [address] [address]        Equal Access to Services     SANDEEP GRANADOS AH: Amanda Cuadra, alley jo, herber ross  cecilia freireautumntank salguero'aan ah. So [facility] [phone].    ATENCIÓN: Si andraela susy, tiene a salgado disposición servicios gratuitos de asistencia lingüística. Sita al [phone].    We comply with applicable federal civil rights laws and [place] laws. We do not discriminate on the basis of race, color, national origin, age, disability, sex, sexual orientation, or gender identity.            Thank you!     Thank you for choosing [facility]  for your care. Our goal is always to provide you with excellent care. Hearing back from our patients is one way we can continue to improve our services. Please take a few minutes to complete the written survey that you may receive in the mail after your visit with us. Thank you!             Your Updated Medication List - Protect others around you: Learn how to safely use, store and throw away your medicines at www.disposemymeds.org.          This list is accurate as of 3/8/18 11:59 PM.  Always use your most recent med list.                   Brand Name Dispense Instructions for use Diagnosis    * albuterol (2.5 MG/3ML) 0.083% neb solution      Take 1 vial by nebulization every 6 hours as needed for shortness of breath / dyspnea or wheezing        * albuterol 108 (90 BASE) MCG/ACT Inhaler    PROAIR HFA/PROVENTIL HFA/VENTOLIN HFA    1 Inhaler    Inhale 2 puffs into the lungs every 6 hours    Mild persistent asthma, uncomplicated       amLODIPine 5 MG tablet    NORVASC    90 tablet    Take 1 tablet (5 mg) by mouth daily    Essential hypertension, benign       buPROPion 300 MG 24 hr tablet    WELLBUTRIN XL    90 tablet    Take 1 tablet (300 mg) by mouth every morning    Mild recurrent major depression (H)       fluticasone-salmeterol 250-50 MCG/DOSE diskus inhaler    ADVAIR    3 Inhaler    Inhale 1 puff into the lungs 2 times daily    Mild persistent asthma, uncomplicated       loratadine 10 MG tablet    CLARITIN    30 tablet    Take 1 tablet (10 mg) by mouth daily     Allergic rhinitis, mild       meclizine 25 MG tablet    ANTIVERT    30 tablet    Take 1 tablet (25 mg) by mouth every 6 hours as needed for dizziness        methylPREDNISolone 4 MG tablet    MEDROL DOSEPAK    21 tablet    Follow package instructions        ondansetron 4 MG ODT tab    ZOFRAN ODT    20 tablet    Take 1 tablet (4 mg) by mouth every 6 hours as needed for nausea or vomiting        rosuvastatin 5 MG tablet    CRESTOR    90 tablet    Take 1 tablet by mouth once daily    Hyperlipidemia with target LDL less than 130       venlafaxine 75 MG 24 hr capsule    EFFEXOR-XR     Take 150 mg by mouth daily        * Notice:  This list has 2 medication(s) that are the same as other medications prescribed for you. Read the directions carefully, and ask your doctor or other care provider to review them with you.

## 2018-03-09 PROBLEM — G47.33 OSA (OBSTRUCTIVE SLEEP APNEA): Status: ACTIVE | Noted: 2018-03-09

## 2018-03-09 LAB — SLPCOMP: NORMAL

## 2018-03-09 NOTE — PROCEDURES
" SLEEP STUDY INTERPRETATION  SPLIT NIGHT STUDY      Patient: MANDEEP LOCO  YOB: 1965  Study Date: 3/8/2018  MRN: [id number]  Referring Provider: Sandra Guardado PA-C  Ordering Provider: Goltz, Pa-C, Bennett    Indications for Polysomnography: The patient is a 52 y old Male who is 5' 10\" and weighs 247.0 lbs. His BMI is 35.8, [place] sleepiness scale 8.0 and neck circumference is 44.0 cm. Relevant medical history includes Mild ADALGISA, HTN, depression, moderate asthma, hyperlipidemia, diverticulitis, and obesity. A diagnostic polysomnogram was performed to evaluate for ADALGISA. After 143.5 minutes of sleep time the patient exhibited sufficient respiratory events qualifying him for a CPAP trial which was then initiated.    Polysomnogram Data: A full night polysomnogram recorded the standard physiologic parameters including EEG, EOG, EMG, ECG, nasal and oral airflow. Respiratory parameters of chest and abdominal movements were recorded with respiratory inductance plethysmography. Oxygen saturation was recorded by pulse oximetry.  Hypopnea scoring rule used: 1B 4%    Diagnostic PSG  Sleep Architecture: Normal sleep latency without sleep aid, increased arousal index, and decreased sleep efficiency.  The total recording time of the polysomnogram was 173.9 minutes. The total sleep time was 143.5 minutes. Sleep latency was normal at 7.9 minutes without the use of a sleep aid. REM latency was - minutes. Arousal index was increased at 77.4 arousals per hour. Sleep efficiency was decreased at 82.5%. Wake after sleep onset was 4.5 minutes. The patient spent 8.7% of total sleep time in Stage N1, 80.1% in Stage N2, 11.1% in Stage N3, and 0.0% in REM. Time in REM supine was - minutes.    Respiration: Severe obstructive sleep apnea.    Events ? The polysomnogram revealed a presence of 52 obstructive, 12 central, and - mixed apneas resulting in an apnea index of 26.8 events per hour. There were 118 obstructive hypopneas and - " central hypopneas resulting in an obstructive hypopnea index of 49.3 events per hour. The combined apnea/hypopnea index was 76.1 events per hour (central apnea/hypopnea index was 5.0 events per hour).  The supine AHI was - events per hour. The RERA index was 5.4 events per hour. The RDI was 81.5 events per hour.    Snoring - was reported as mild to moderate.    Respiratory rate and pattern - was normal.    Sustained Sleep Associated Hypoventilation - Transcutaneous carbon dioxide monitoring was not used; however significant hypoventilation was not suggested by oximetry.    Sleep Associated Hypoxemia - (Greater than 5 minutes O2 sat at or below 88%) was not present. Baseline oxygen saturation was 92.9%. Lowest oxygen saturation was 85.6%. Time spent less than or equal to 88% was 1.7 minutes.     Treatment PSG  Sleep Architecture: On PAP, improved arousal index and unchanged sleep efficiency.  At 01:06:36 AM the patient was placed on PAP treatment and was titrated at pressures ranging from CPAP 5 cmH2O up to CPAP 10 cmH2O. The total recording time of the treatment portion of the study was 299.0 minutes. The total sleep time was 250.5 minutes. During the treatment portion of the study the sleep latency was 5.5 minutes. REM latency was 232.0 minutes. Arousal index was increased at 43.4 arousals per hour. Sleep efficiency was decreased at 83.8%. Wake after sleep onset was 41.0 minutes. The patient spent 17.8% of total sleep time in Stage N1, 51.7% in Stage N2, 20.4% in Stage N3, and 10.2% in REM. Time in REM supine was - minutes.     Respiration: Adequate titration with elevated residual due to central apneas.  No supine sleep at higher pressures.    The final pressure was CPAP 10 cmH2O with an AHI of 17.2 events per hour. Time in REM supine on final pressure was - minutes.     This titration was considered:  - Adequate (residual AHI with 75% decrease or above constraints without REM?supine sleep at final  pressure).    Movement Activity: Few PLMs without clinical significance.    Periodic Limb Movements  o During the diagnostic portion of the study, there were - PLMs recorded.   o During the treatment portion of the study, there were 9 PLMs recorded. The PLM index was 2.2 movements per hour. The PLM Arousal Index was 0.2 per hour.    REM EMG Activity - Excessive muscle activity was not present.    Nocturnal Behavior - Abnormal sleep related behaviors were not noted.    Bruxism - None apparent.    Cardiac Summary: No significant arrhythmias noted.  During the diagnostic portion of the study, the average pulse rate was 72.8 bpm. The minimum pulse rate was 60.9 bpm while the maximum pulse rate was 89.0 bpm.    During the treatment portion of the study, the average pulse rate was 63.9 bpm. The minimum pulse rate was 54.8 bpm while the maximum pulse rate was 95.1 bpm.     Assessment:     Normal sleep latency without sleep aid, increased arousal index, and decreased sleep efficiency.    On PAP, improved arousal index and unchanged sleep efficiency.    Severe obstructive sleep apnea.    Adequate titration with elevated residual due to central apneas.  No supine sleep at higher pressures.    Few PLMs without clinical significance.    Recommendations:    Treatment of ADALGISA with Auto?titrating PAP therapy with a range of 9 cmH2O to 15 cmH2O. Recommend clinical follow up with sleep management team, including review of compliance measures.    Advice regarding the risks of drowsy driving.    Suggest optimizing sleep schedule and avoiding sleep deprivation.    Weight management (if BMI > 30).    Pharmacologic therapy should be used for management of restless legs syndrome only if present and clinically indicated and not based on the presence of periodic limb movements alone.    Diagnostic Codes:   Obstructive Sleep Apnea G47.33   _____________________________________   Electronically Signed By: Mina Jackson MD 3/9/2018

## 2018-03-09 NOTE — PROGRESS NOTES
Completed a split night PSG per provider order.    Preliminary AHI .  A final therapeutic PAP pressure was achieved.    Supine REM was not seen on therapeutic pressure.    Patient reports feeling refreshed in AM.

## 2018-03-18 ENCOUNTER — TELEPHONE (OUTPATIENT)
Dept: FAMILY MEDICINE | Facility: CLINIC | Age: 53
End: 2018-03-18

## 2018-03-18 DIAGNOSIS — E78.5 HYPERLIPIDEMIA WITH TARGET LDL LESS THAN 130: ICD-10-CM

## 2018-03-19 RX ORDER — ROSUVASTATIN CALCIUM 5 MG/1
TABLET, COATED ORAL
Qty: 90 TABLET | Refills: 0 | Status: SHIPPED | OUTPATIENT
Start: 2018-03-19 | End: 2018-11-27

## 2018-03-19 NOTE — TELEPHONE ENCOUNTER
Routing refill request to provider for review/approval because:  Drug interaction warning    After visit Medications (1 Order)   AcceptCancelrosuvastatin (CRESTOR) 5 MG tablet [Pharmacy Med Name: Rosuvastatin Calcium Oral Tablet 5 MG]  Allergy/Contraindication:Hmg-coa-r InhibitorsReactions:Other (see Comments)      Lurdes Fernandes RN  [employer]

## 2018-03-19 NOTE — TELEPHONE ENCOUNTER
" rosuvastatin (CRESTOR) 5 MG tablet 90 tablet 0 10/25/2017     Last Written Prescription Date:  10/25/17  Last Fill Quantity: 90,  # refills: 0   Last office visit: 2/2/2018 with prescribing provider:  Debby   Future Office Visit:  none    Requested Prescriptions   Pending Prescriptions Disp Refills     rosuvastatin (CRESTOR) 5 MG tablet [Pharmacy Med Name: Rosuvastatin Calcium Oral Tablet 5 MG] 90 tablet 0     Sig: TAKE ONE TABLET BY MOUTH ONE TIME DAILY    Statins Protocol Passed    3/18/2018  2:51 PM       Passed - LDL on file in past 12 months    Recent Labs   Lab Test  07/26/17   0808   LDL  230*            Passed - No abnormal creatine kinase in past 12 months    Recent Labs   Lab Test  07/26/17   0808   CKT  68               Passed - Recent (12 mo) or future (30 days) visit within the authorizing provider's specialty    Patient had office visit in the last 12 months or has a visit in the next 30 days with authorizing provider or within the authorizing provider's specialty.  See \"Patient Info\" tab in inbasket, or \"Choose Columns\" in Meds & Orders section of the refill encounter.           Passed - Patient is age 18 or older        PHQ-9 SCORE 7/25/2017 1/10/2018 1/10/2018   Total Score - - -   Total Score 1 6 1     "

## 2018-03-23 ENCOUNTER — OFFICE VISIT (OUTPATIENT)
Dept: SLEEP MEDICINE | Facility: CLINIC | Age: 53
End: 2018-03-23
Payer: COMMERCIAL

## 2018-03-23 VITALS
DIASTOLIC BLOOD PRESSURE: 80 MMHG | SYSTOLIC BLOOD PRESSURE: 119 MMHG | HEART RATE: 105 BPM | HEIGHT: 70 IN | BODY MASS INDEX: 35.36 KG/M2 | WEIGHT: 247 LBS | OXYGEN SATURATION: 97 % | RESPIRATION RATE: 16 BRPM

## 2018-03-23 DIAGNOSIS — G47.33 OSA (OBSTRUCTIVE SLEEP APNEA): ICD-10-CM

## 2018-03-23 PROCEDURE — 99214 OFFICE O/P EST MOD 30 MIN: CPT | Performed by: PHYSICIAN ASSISTANT

## 2018-03-23 NOTE — NURSING NOTE
"Chief Complaint   Patient presents with     Study Results     PSG f/u       Initial /80  Pulse 105  Resp 16  Ht 1.778 m (5' 10\")  Wt 112 kg (247 lb)  SpO2 97%  BMI 35.44 kg/m2 Estimated body mass index is 35.44 kg/(m^2) as calculated from the following:    Height as of this encounter: 1.778 m (5' 10\").    Weight as of this encounter: 112 kg (247 lb).  Medication Reconciliation: complete     ESS 6  Radha Lester CMA  "

## 2018-03-23 NOTE — PROGRESS NOTES
Sleep Study Follow-Up Visit:    Date on this visit: 3/23/2018    Romelia Crystal comes in today for follow-up of his sleep study done on 3/8/2018 at the [facility] for further management of previously diagnosed ADALGISA. His medical history is significant for HTN, depression, moderate asthma, hyperlipidemia, diverticulitis.  He had a sleep study at [facility] in 2001. His AHI was 6.1/hr and RDI was 33.2/hr. His O2 apollo was 89%. He spent 31.6% of the night supine and had 75.5% of his breathing events in that position. CPAP was titrated to 12 cm. He weighed 236 pounds at that time.    Sleep latency 7.9 minutes without Ambien.  REM not achieved.  Sleep efficiency 82.5%. Total sleep time 143.5 minutes.    Sleep architecture:  Stage 1, 8.7% (5%), stage 2, 80.1% (45-55%), stage 3, 11.1% (15-20%), stage REM, 0% (20-25%).  AHI was 76.1/hr (12 of 182 events were central), with desaturations down to 86%. He spent 4.7 minutes below 90% SpO2. RDI 81.5/hr.  REM RDI N/A.  Supine RDI N/A.  Periodic Limb Movement Index 0/hour.       CPAP titration:  Sleep latency 5.5 minutes.  REM latency 232 minutes.  Sleep efficiency 83.8%. Total sleep time 250.5 minutes. Sleep architecture:  Stage 1, 17.8% (5%), stage 2, 51.7% (45-55%), stage 3, 20.4% (15-20%), stage REM, 10.2% (20-25%).  CPAP was titrated to 10 cm with residual central apneas. His AHI was 16.7/hr elimination of apneas, hypopneas and desaturations. Supine REM was not noted at this pressure. These findings were reviewed with the patient.    Past medical/surgical history, family history, social history, medications and allergies were reviewed.      Problem List:  Patient Active Problem List    Diagnosis Date Noted     ADALGISA (obstructive sleep apnea) 03/09/2018     Priority: Medium     3/8/2018 [facility] Sleep Study (247.0 lbs) - AHI 76.1, RDI 81.5, Supine AHI -, REM AHI -, Low O2% 85.6%, Time Spent ?88% 1.7, Time Spent ?89% 4.7. Treatment was titrated to a pressure of CPAP  10 with an AHI 17.2. Time spent in REM supine at this pressure was - minutes.       Diverticulitis 12/20/2017     Priority: Medium     Moderate persistent asthma, uncomplicated 04/11/2016     Priority: Medium     Decreased libido 04/11/2016     Priority: Medium     Essential hypertension, benign 08/05/2014     Priority: Medium     Eczema      Priority: Medium     Hives 01/29/2013     Priority: Medium     BPPV (benign paroxysmal positional vertigo) 01/29/2013     Priority: Medium     Mild recurrent major depression (H) 06/12/2012     Priority: Medium     Hyperlipidemia with target LDL less than 130      Priority: Medium     Diagnosis updated by automated process. Provider to review and confirm.       Allergic rhinitis, mild      Priority: Medium     with cough          Impression/Plan:    (G47.33) ADALGISA (obstructive sleep apnea)  Comment: Romelia had severe ADALGISA, AHI 76/hr. The entire baseline was in NREM in lateral positions. He did well on CPAP 9 cm laterally in REM. Supine REM was not observed, but he does not feel comfortable sleeping supine.  Plan: Comprehensive DME        Auto CPAP 8-12 cm, heated humidifier and compliance meter. The patient was informed of the mask exchange policy and the compliance goals. They were also informed that they would be followed by the sleep therapy management team during the first month of CPAP use.       He will follow up with me in about 2 month(s).     Twenty-five minutes spent with patient, all of which were spent face-to-face counseling, consulting, coordinating plan of care.      Bennett Goltz, PA-C    CC: Sandra Guardado PA-C

## 2018-03-23 NOTE — PATIENT INSTRUCTIONS
You will be provided with an auto-titrating CPAP with a pressure range of 8-12 cm with heated humidity to limit nasal congestion. Adjust the heat level on humidifier to find a setting that prevents dry nose but does not cause condensation in the hose or mask. Use distilled water in the humidifier.  The CPAP has a ramp function that starts the pressure lower than your prescribed pressure and gradually increases it over a number of minutes.  This may make it easier to fall asleep.    Try to use the CPAP every-night, all night (minimum of 4 hours). Many insurances require that we prove you are using the CPAP at least 4 hours on at least 70% of nights over a 30 day period. We have 90 days to meet those criteria.            Discussed weight management and the impact of weight gain on sleep apnea.  Let me know if you snore or feel the pressure is too high.    You can get new supplies (mask, hose and filter) for your CPAP every 3-6 months, covered by insurance. You do not need to get supplies that often, but they are available if you would like them.  You may exchange the mask once within the first month if you feel the initial mask does not fit well.  Contact your medical equipment provider for equipment issues.  Please let me know if you have any return of snoring, daytime sleepiness or poor sleep quality. We will want to make sure your CPAP is adequately treating your apnea.  There is a website called CPAP.com that has accessories that may make CPAP use easier. Please visit it at your convenience.  Our phone number is [phone].    Follow-up 2 month.  Bring your CPAP machine with you to the follow up appointment.    Your BMI is Body mass index is 35.44 kg/(m^2).  Weight management is a personal decision.  If you are interested in exploring weight loss strategies, the following discussion covers the approaches that may be successful. Body mass index (BMI) is one way to tell whether you are at a healthy weight,  overweight, or obese. It measures your weight in relation to your height.  A BMI of 18.5 to 24.9 is in the healthy range. A person with a BMI of 25 to 29.9 is considered overweight, and someone with a BMI of 30 or greater is considered obese. More than two-thirds of [place] adults are considered overweight or obese.  Being overweight or obese increases the risk for further weight gain. Excess weight may lead to heart disease and diabetes.  Creating and following plans for healthy eating and physical activity may help you improve your health.  Weight control is part of healthy lifestyle and includes exercise, emotional health, and healthy eating habits. Careful eating habits lifelong are the mainstay of weight control. Though there are significant health benefits from weight loss, long-term weight loss with diet alone may be very difficult to achieve- studies show long-term success with dietary management in less than 10% of people. Attaining a healthy weight may be especially difficult to achieve in those with severe obesity. In some cases, medications, devices and surgical management might be considered.  What can you do?  If you are overweight or obese and are interested in methods for weight loss, you should discuss this with your provider.     Consider reducing daily calorie intake by 500 calories.     Keep a food journal.     Avoiding skipping meals, consider cutting portions instead.    Diet combined with exercise helps maintain muscle while optimizing fat loss. Strength training is particularly important for building and maintaining muscle mass. Exercise helps reduce stress, increase energy, and improves fitness. Increasing exercise without diet control, however, may not burn enough calories to loose weight.       Start walking three days a week 10-20 minutes at a time    Work towards walking thirty minutes five days a week     Eventually, increase the speed of your walking for 1-2 minutes at time    In  addition, we recommend that you review healthy lifestyles and methods for weight loss available through the National Institutes of Health patient information sites:  http://win.niddk.nih.gov/publications/index.htm    And look into health and wellness programs that may be available through your health insurance provider, employer, local community center, or [employer].    Weight management plan: Patient was referred to their PCP to discuss a diet and exercise plan.

## 2018-03-23 NOTE — MR AVS SNAPSHOT
After Visit Summary   3/23/2018    Romelia Crystal    MRN: [id number]           Patient Information     Date Of Birth          1965        Visit Information        Provider Department      3/23/2018 10:30 AM Goltz, Bennett Ezra, PA-C [facility]        Today's Diagnoses     ADALGISA (obstructive sleep apnea)          Care Instructions    You will be provided with an auto-titrating CPAP with a pressure range of 8-12 cm with heated humidity to limit nasal congestion. Adjust the heat level on humidifier to find a setting that prevents dry nose but does not cause condensation in the hose or mask. Use distilled water in the humidifier.  The CPAP has a ramp function that starts the pressure lower than your prescribed pressure and gradually increases it over a number of minutes.  This may make it easier to fall asleep.    Try to use the CPAP every-night, all night (minimum of 4 hours). Many insurances require that we prove you are using the CPAP at least 4 hours on at least 70% of nights over a 30 day period. We have 90 days to meet those criteria.            Discussed weight management and the impact of weight gain on sleep apnea.  Let me know if you snore or feel the pressure is too high.    You can get new supplies (mask, hose and filter) for your CPAP every 3-6 months, covered by insurance. You do not need to get supplies that often, but they are available if you would like them.  You may exchange the mask once within the first month if you feel the initial mask does not fit well.  Contact your medical equipment provider for equipment issues.  Please let me know if you have any return of snoring, daytime sleepiness or poor sleep quality. We will want to make sure your CPAP is adequately treating your apnea.  There is a website called CPAP.com that has accessories that may make CPAP use easier. Please visit it at your convenience.  Our phone number is [phone].    Follow-up 2 month.   Bring your CPAP machine with you to the follow up appointment.    Your BMI is Body mass index is 35.44 kg/(m^2).  Weight management is a personal decision.  If you are interested in exploring weight loss strategies, the following discussion covers the approaches that may be successful. Body mass index (BMI) is one way to tell whether you are at a healthy weight, overweight, or obese. It measures your weight in relation to your height.  A BMI of 18.5 to 24.9 is in the healthy range. A person with a BMI of 25 to 29.9 is considered overweight, and someone with a BMI of 30 or greater is considered obese. More than two-thirds of American adults are considered overweight or obese.  Being overweight or obese increases the risk for further weight gain. Excess weight may lead to heart disease and diabetes.  Creating and following plans for healthy eating and physical activity may help you improve your health.  Weight control is part of healthy lifestyle and includes exercise, emotional health, and healthy eating habits. Careful eating habits lifelong are the mainstay of weight control. Though there are significant health benefits from weight loss, long-term weight loss with diet alone may be very difficult to achieve- studies show long-term success with dietary management in less than 10% of people. Attaining a healthy weight may be especially difficult to achieve in those with severe obesity. In some cases, medications, devices and surgical management might be considered.  What can you do?  If you are overweight or obese and are interested in methods for weight loss, you should discuss this with your provider.     Consider reducing daily calorie intake by 500 calories.     Keep a food journal.     Avoiding skipping meals, consider cutting portions instead.    Diet combined with exercise helps maintain muscle while optimizing fat loss. Strength training is particularly important for building and maintaining muscle mass.  Exercise helps reduce stress, increase energy, and improves fitness. Increasing exercise without diet control, however, may not burn enough calories to loose weight.       Start walking three days a week 10-20 minutes at a time    Work towards walking thirty minutes five days a week     Eventually, increase the speed of your walking for 1-2 minutes at time    In addition, we recommend that you review healthy lifestyles and methods for weight loss available through the National Institutes of Health patient information sites:  http://win.niddk.nih.gov/publications/index.htm    And look into health and wellness programs that may be available through your health insurance provider, employer, local community center, or [employer].    Weight management plan: Patient was referred to their PCP to discuss a diet and exercise plan.            Follow-ups after your visit        Follow-up notes from your care team     Return in about 2 months (around 5/23/2018) for CPAP compliance recheck.      Your next 10 appointments already scheduled     Mar 27, 2018  9:30 AM CDT   [facility] Visit with Sandra Guardado PA-C   [facility]    [address] [address] [phone]           Bring a current list of meds and any records pertaining to this visit. For Physicals, please bring immunization records and any forms needing to be filled out. Please arrive 10 minutes early to complete paperwork.              Who to contact     If you have questions or need follow up information about today's clinic visit or your schedule please contact [facility] directly at [phone].  Normal or non-critical lab and imaging results will be communicated to you by MyChart, letter or phone within 4 business days after the clinic has received the results. If you do not hear from us within 7 days, please contact the clinic through MyChart or phone. If you have a critical or abnormal lab  "result, we will notify you by phone as soon as possible.  Submit refill requests through [employer] or call your pharmacy and they will forward the refill request to us. Please allow 3 business days for your refill to be completed.          Additional Information About Your Visit        2heuresavanthart Information     [employer] gives you secure access to your electronic health record. If you see a primary care provider, you can also send messages to your care team and make appointments. If you have questions, please call your primary care clinic.  If you do not have a primary care provider, please call [phone] and they will assist you.        Care EveryWhere ID     This is your Care EveryWhere ID. This could be used by other organizations to access your [facility] [place] medical records  [id number]        Your Vitals Were     Pulse Respirations Height Pulse Oximetry BMI (Body Mass Index)       105 16 1.778 m (5' 10\") 97% 35.44 kg/m2        Blood Pressure from Last 3 Encounters:   03/23/18 119/80   02/02/18 (!) 146/94   02/02/18 (!) 134/97    Weight from Last 3 Encounters:   03/23/18 112 kg (247 lb)   02/02/18 111.1 kg (245 lb)   02/02/18 111.1 kg (245 lb)              We Performed the Following     Comprehensive DME          Today's Medication Changes          These changes are accurate as of 3/23/18 10:54 AM.  If you have any questions, ask your nurse or doctor.               These medicines have changed or have updated prescriptions.        Dose/Directions    * albuterol (2.5 MG/3ML) 0.083% neb solution   This may have changed:  Another medication with the same name was changed. Make sure you understand how and when to take each.        Dose:  1 vial   Take 1 vial by nebulization every 6 hours as needed for shortness of breath / dyspnea or wheezing   Refills:  0       * albuterol 108 (90 BASE) MCG/ACT Inhaler   Commonly known as:  PROAIR HFA/PROVENTIL HFA/VENTOLIN HFA   This may have changed:    - when to take this  - reasons to " take this   Used for:  Mild persistent asthma, uncomplicated        Dose:  2 puff   Inhale 2 puffs into the lungs every 6 hours   Quantity:  1 Inhaler   Refills:  6       loratadine 10 MG tablet   Commonly known as:  CLARITIN   This may have changed:    - when to take this  - reasons to take this   Used for:  Allergic rhinitis, mild        Dose:  10 mg   Take 1 tablet (10 mg) by mouth daily   Quantity:  30 tablet   Refills:  0       * Notice:  This list has 2 medication(s) that are the same as other medications prescribed for you. Read the directions carefully, and ask your doctor or other care provider to review them with you.             Primary Care Provider Office Phone # Fax #    Sandra Guardado PA-C [phone] [address] [address]        Equal Access to Services     SANDEEP GRANADOS : Amanda santiago Sorobert, waaxda luqadaha, qaybta kaalmada adeegyakyle, herber thompson . So [facility] [phone].    ATENCIÓN: Si habla español, tiene a salgado disposición servicios gratuitos de asistencia lingüística. Llame al [phone].    We comply with applicable federal civil rights laws and [place] laws. We do not discriminate on the basis of race, color, national origin, age, disability, sex, sexual orientation, or gender identity.            Thank you!     Thank you for choosing [facility]  for your care. Our goal is always to provide you with excellent care. Hearing back from our patients is one way we can continue to improve our services. Please take a few minutes to complete the written survey that you may receive in the mail after your visit with us. Thank you!             Your Updated Medication List - Protect others around you: Learn how to safely use, store and throw away your medicines at www.disposemymeds.org.          This list is accurate as of 3/23/18 10:54 AM.  Always use your most recent med list.                   Brand Name  Dispense Instructions for use Diagnosis    * albuterol (2.5 MG/3ML) 0.083% neb solution      Take 1 vial by nebulization every 6 hours as needed for shortness of breath / dyspnea or wheezing        * albuterol 108 (90 BASE) MCG/ACT Inhaler    PROAIR HFA/PROVENTIL HFA/VENTOLIN HFA    1 Inhaler    Inhale 2 puffs into the lungs every 6 hours    Mild persistent asthma, uncomplicated       amLODIPine 5 MG tablet    NORVASC    90 tablet    Take 1 tablet (5 mg) by mouth daily    Essential hypertension, benign       buPROPion 300 MG 24 hr tablet    WELLBUTRIN XL    90 tablet    Take 1 tablet (300 mg) by mouth every morning    Mild recurrent major depression (H)       fluticasone-salmeterol 250-50 MCG/DOSE diskus inhaler    ADVAIR    3 Inhaler    Inhale 1 puff into the lungs 2 times daily    Mild persistent asthma, uncomplicated       loratadine 10 MG tablet    CLARITIN    30 tablet    Take 1 tablet (10 mg) by mouth daily    Allergic rhinitis, mild       rosuvastatin 5 MG tablet    CRESTOR    90 tablet    TAKE ONE TABLET BY MOUTH ONE TIME DAILY    Hyperlipidemia with target LDL less than 130       venlafaxine 75 MG 24 hr capsule    EFFEXOR-XR     Take 150 mg by mouth daily        * Notice:  This list has 2 medication(s) that are the same as other medications prescribed for you. Read the directions carefully, and ask your doctor or other care provider to review them with you.

## 2018-03-26 ENCOUNTER — DOCUMENTATION ONLY (OUTPATIENT)
Dept: SLEEP MEDICINE | Facility: CLINIC | Age: 53
End: 2018-03-26

## 2018-03-26 DIAGNOSIS — G47.33 OSA (OBSTRUCTIVE SLEEP APNEA): ICD-10-CM

## 2018-03-26 NOTE — PROGRESS NOTES
Patient was offered choice of vendor and chose Replaced by [facility].  Patient Romelia Crystal was set up at [facility] on March 26, 2018. Patient received a Resmed AirSense 10 Auto. Pressures were set at 8-12 cm H2O.   Patient s ramp is 5 cm H2O for Auto and FLEX/EPR is 2.  Patient received a Resmed Mask name: MIRAGE FX  Nasal mask Size Wide, heated tubing and heated humidifier.  Patient is enrolled in the STM Program and does not need to meet compliance.   Jaycee Hartman

## 2018-03-29 ENCOUNTER — OFFICE VISIT (OUTPATIENT)
Dept: FAMILY MEDICINE | Facility: CLINIC | Age: 53
End: 2018-03-29
Payer: COMMERCIAL

## 2018-03-29 VITALS
BODY MASS INDEX: 35.76 KG/M2 | DIASTOLIC BLOOD PRESSURE: 86 MMHG | SYSTOLIC BLOOD PRESSURE: 128 MMHG | HEIGHT: 70 IN | HEART RATE: 76 BPM | OXYGEN SATURATION: 96 % | WEIGHT: 249.8 LBS | TEMPERATURE: 97.3 F

## 2018-03-29 DIAGNOSIS — E78.5 HYPERLIPIDEMIA WITH TARGET LDL LESS THAN 130: Primary | ICD-10-CM

## 2018-03-29 DIAGNOSIS — I10 ESSENTIAL HYPERTENSION, BENIGN: ICD-10-CM

## 2018-03-29 DIAGNOSIS — F33.0 MILD RECURRENT MAJOR DEPRESSION (H): ICD-10-CM

## 2018-03-29 DIAGNOSIS — J45.40 MODERATE PERSISTENT ASTHMA, UNCOMPLICATED: ICD-10-CM

## 2018-03-29 DIAGNOSIS — R97.20 ELEVATED PROSTATE SPECIFIC ANTIGEN (PSA): ICD-10-CM

## 2018-03-29 DIAGNOSIS — E66.01 MORBID OBESITY (H): ICD-10-CM

## 2018-03-29 LAB
ALBUMIN SERPL-MCNC: 4 G/DL (ref 3.4–5)
ALP SERPL-CCNC: 75 U/L (ref 40–150)
ALT SERPL W P-5'-P-CCNC: 36 U/L (ref 0–70)
ANION GAP SERPL CALCULATED.3IONS-SCNC: 5 MMOL/L (ref 3–14)
AST SERPL W P-5'-P-CCNC: 23 U/L (ref 0–45)
BILIRUB SERPL-MCNC: 0.4 MG/DL (ref 0.2–1.3)
BUN SERPL-MCNC: 13 MG/DL (ref 7–30)
CALCIUM SERPL-MCNC: 8.8 MG/DL (ref 8.5–10.1)
CHLORIDE SERPL-SCNC: 107 MMOL/L (ref 94–109)
CHOLEST SERPL-MCNC: 253 MG/DL
CO2 SERPL-SCNC: 29 MMOL/L (ref 20–32)
CREAT SERPL-MCNC: 0.98 MG/DL (ref 0.66–1.25)
GFR SERPL CREATININE-BSD FRML MDRD: 80 ML/MIN/1.7M2
GLUCOSE SERPL-MCNC: 99 MG/DL (ref 70–99)
HDLC SERPL-MCNC: 48 MG/DL
LDLC SERPL CALC-MCNC: 164 MG/DL
NONHDLC SERPL-MCNC: 205 MG/DL
POTASSIUM SERPL-SCNC: 4.1 MMOL/L (ref 3.4–5.3)
PROT SERPL-MCNC: 6.8 G/DL (ref 6.8–8.8)
SODIUM SERPL-SCNC: 141 MMOL/L (ref 133–144)
TRIGL SERPL-MCNC: 204 MG/DL

## 2018-03-29 PROCEDURE — 80053 COMPREHEN METABOLIC PANEL: CPT | Performed by: PHYSICIAN ASSISTANT

## 2018-03-29 PROCEDURE — 80061 LIPID PANEL: CPT | Performed by: PHYSICIAN ASSISTANT

## 2018-03-29 PROCEDURE — 36415 COLL VENOUS BLD VENIPUNCTURE: CPT | Performed by: PHYSICIAN ASSISTANT

## 2018-03-29 PROCEDURE — 99214 OFFICE O/P EST MOD 30 MIN: CPT | Performed by: PHYSICIAN ASSISTANT

## 2018-03-29 NOTE — PROGRESS NOTES
HPI: 51 yo male here for f/u hyperlipidemia, asthma, HTN, ADALGISA and had vertigo in Feb  He was seen in ED, dx with vestibular neuritis, tx with Medrol dose pack and Meclizine and sxs resolved in about 5 days  He is using a Cpap for ADALGISA and that is working better, sleeping better and feeling refreshed in the mornings  Last July his cholesterol was super high ( with )  Taking crestor 5mg without any side effects like he had with other statins (myalgias)    HTN: he hasn't been checking his BP regularly but the few times he has check this it has been fine.  He is taking norvasc 5mg qd without SE    Asthma: had a hard time with wheezing over the winter and needed to use nebs a few times  He is compliant with taking Advair qd-bid    Depression: followed by [facility]  Gets rx there.    He has elevated PSA and is followed by urology    Past Medical History:   Diagnosis Date     Allergic rhinitis, mild     with cough     Eczema      HTN (hypertension), benign      Hyperlipidemia LDL goal < 130      Mild persistent asthma      Mild persistent asthma      Mild recurrent major depression (H) 6/12/2012    [facility]     Past Surgical History:   Procedure Laterality Date     TONSILLECTOMY       wisdom teeth       Social History   Substance Use Topics     Smoking status: Never Smoker     Smokeless tobacco: Never Used     Alcohol use 0.0 oz/week     0 Standard drinks or equivalent per week      Comment: 2-3 beers on wkend     Current Outpatient Prescriptions   Medication Sig Dispense Refill     rosuvastatin (CRESTOR) 5 MG tablet TAKE ONE TABLET BY MOUTH ONE TIME DAILY  90 tablet 0     albuterol (2.5 MG/3ML) 0.083% neb solution Take 1 vial by nebulization every 6 hours as needed for shortness of breath / dyspnea or wheezing       amLODIPine (NORVASC) 5 MG tablet Take 1 tablet (5 mg) by mouth daily 90 tablet 3     fluticasone-salmeterol (ADVAIR) 250-50 MCG/DOSE diskus inhaler Inhale 1 puff into the  "lungs 2 times daily 3 Inhaler 3     albuterol (PROAIR HFA/PROVENTIL HFA/VENTOLIN HFA) 108 (90 BASE) MCG/ACT Inhaler Inhale 2 puffs into the lungs every 6 hours (Patient taking differently: Inhale 2 puffs into the lungs every 6 hours as needed ) 1 Inhaler 6     loratadine (CLARITIN) 10 MG tablet Take 1 tablet (10 mg) by mouth daily (Patient taking differently: Take 10 mg by mouth as needed ) 30 tablet 0     buPROPion (WELLBUTRIN XL) 300 MG 24 hr tablet Take 1 tablet (300 mg) by mouth every morning 90 tablet 1     venlafaxine (EFFEXOR-XR) 75 MG 24 hr capsule Take 150 mg by mouth daily        Allergies   Allergen Reactions     Hmg-Coa-R Inhibitors Other (See Comments)     Some Statins Muscle aches      Lisinopril Cough     FAMILY HISTORY NOTED AND REVIEWED    PHYSICAL EXAM:    /86 (BP Location: Right arm, Cuff Size: Adult Large)  Pulse 76  Temp 97.3  F (36.3  C) (Oral)  Ht 5' 10\" (1.778 m)  Wt 249 lb 12.8 oz (113.3 kg)  SpO2 96%  BMI 35.84 kg/m2    Patient appears non toxic  Lungs: CTA bilat  Heart: RRR without m/r/g.  Extr: no edema.  Psych; approp affect and mood.    Assessment and Plan:     (E78.5) Hyperlipidemia with target LDL less than 130  (primary encounter diagnosis)  Comment: tolerating crestor fortunately given hx of statin myopathy with other statins.  Plan: Lipid Profile            (I10) Essential hypertension, benign  Comment: well controlled, cont same  Plan: Comprehensive metabolic panel            (E66.01) Morbid obesity (H)  Comment:   Plan: discussed importance of weight loss given his chronic medical conditions    (J45.40) Moderate persistent asthma, uncomplicated  Comment:   Plan: stable, cont same. Discussed compliance with advair    (F33.0) Mild recurrent major depression (H)  Comment:   Plan: followed by psychiatry. DAP and PHQ 9 today    (R97.20) Elevated prostate specific antigen (PSA)  Comment:   Plan: recd he f/u with urology since over due.      Sandra Guardado PA-C            "

## 2018-03-29 NOTE — PROGRESS NOTES
"[place],    The cholesterol is better, but still not at goal.  The total went from 317 to 253. The LDL or \"bad cholesterol\" went from 230 to 164.  Try taking 2 Crestor for a total of 10mg daily for a few weeks and let me know if you tolerate that dose.  If so, I will send in a prescription for that dose.    Your blood sugar, electrolytes, kidney and liver functions were normal.    Sandra Guardado PA-C  "

## 2018-03-29 NOTE — MR AVS SNAPSHOT
After Visit Summary   3/29/2018    Romelia Crystal    MRN: [id number]           Patient Information     Date Of Birth          1965        Visit Information        Provider Department      3/29/2018 10:00 AM Sandra Guardado PA-C [facility]        Today's Diagnoses     Hyperlipidemia with target LDL less than 130    -  1    Essential hypertension, benign        Moderate persistent asthma, uncomplicated        Mild recurrent major depression (H)        Elevated prostate specific antigen (PSA)           Follow-ups after your visit        Who to contact     If you have questions or need follow up information about today's clinic visit or your schedule please contact [facility] directly at [phone].  Normal or non-critical lab and imaging results will be communicated to you by Chip[employer], letter or phone within 4 business days after the clinic has received the results. If you do not hear from us within 7 days, please contact the clinic through [employer] or phone. If you have a critical or abnormal lab result, we will notify you by phone as soon as possible.  Submit refill requests through [employer] or call your pharmacy and they will forward the refill request to us. Please allow 3 business days for your refill to be completed.          Additional Information About Your Visit        MyChart Information     [employer] gives you secure access to your electronic health record. If you see a primary care provider, you can also send messages to your care team and make appointments. If you have questions, please call your primary care clinic.  If you do not have a primary care provider, please call [phone] and they will assist you.        Care EveryWhere ID     This is your Care EveryWhere ID. This could be used by other organizations to access your [place] medical records  [id number]        Your Vitals Were     Pulse Temperature Height Pulse Oximetry BMI (Body Mass Index)       76  "97.3  F (36.3  C) (Oral) 5' 10\" (1.778 m) 96% 35.84 kg/m2        Blood Pressure from Last 3 Encounters:   03/29/18 128/86   03/23/18 119/80   02/02/18 (!) 146/94    Weight from Last 3 Encounters:   03/29/18 249 lb 12.8 oz (113.3 kg)   03/23/18 247 lb (112 kg)   02/02/18 245 lb (111.1 kg)              We Performed the Following     DEPRESSION ACTION PLAN (DAP)          Today's Medication Changes          These changes are accurate as of 3/29/18 10:21 AM.  If you have any questions, ask your nurse or doctor.               These medicines have changed or have updated prescriptions.        Dose/Directions    * albuterol (2.5 MG/3ML) 0.083% neb solution   This may have changed:  Another medication with the same name was changed. Make sure you understand how and when to take each.        Dose:  1 vial   Take 1 vial by nebulization every 6 hours as needed for shortness of breath / dyspnea or wheezing   Refills:  0       * albuterol 108 (90 BASE) MCG/ACT Inhaler   Commonly known as:  PROAIR HFA/PROVENTIL HFA/VENTOLIN HFA   This may have changed:    - when to take this  - reasons to take this   Used for:  Mild persistent asthma, uncomplicated        Dose:  2 puff   Inhale 2 puffs into the lungs every 6 hours   Quantity:  1 Inhaler   Refills:  6       loratadine 10 MG tablet   Commonly known as:  CLARITIN   This may have changed:    - when to take this  - reasons to take this   Used for:  Allergic rhinitis, mild        Dose:  10 mg   Take 1 tablet (10 mg) by mouth daily   Quantity:  30 tablet   Refills:  0       * Notice:  This list has 2 medication(s) that are the same as other medications prescribed for you. Read the directions carefully, and ask your doctor or other care provider to review them with you.             Primary Care Provider Office Phone # Fax #    Sandra Guardado PA-C [phone] [address]  [place] [address]        Equal Access to Services     SANDEEP GRANADOS AH: Elverii rosenda delaney " pamela Cuadra, wakobeda luqadaha, qaybta kabasim vargas, herber sevillacheyenne david. So [facility] [phone].    ATENCIÓN: Si habla viktoriaañol, tiene a salgado disposición servicios gratuitos de asistencia lingüística. Sita al [phone].    We comply with applicable federal civil rights laws and [place] laws. We do not discriminate on the basis of race, color, national origin, age, disability, sex, sexual orientation, or gender identity.            Thank you!     Thank you for choosing [facility]  for your care. Our goal is always to provide you with excellent care. Hearing back from our patients is one way we can continue to improve our services. Please take a few minutes to complete the written survey that you may receive in the mail after your visit with us. Thank you!             Your Updated Medication List - Protect others around you: Learn how to safely use, store and throw away your medicines at www.disposemymeds.org.          This list is accurate as of 3/29/18 10:21 AM.  Always use your most recent med list.                   Brand Name Dispense Instructions for use Diagnosis    * albuterol (2.5 MG/3ML) 0.083% neb solution      Take 1 vial by nebulization every 6 hours as needed for shortness of breath / dyspnea or wheezing        * albuterol 108 (90 BASE) MCG/ACT Inhaler    PROAIR HFA/PROVENTIL HFA/VENTOLIN HFA    1 Inhaler    Inhale 2 puffs into the lungs every 6 hours    Mild persistent asthma, uncomplicated       amLODIPine 5 MG tablet    NORVASC    90 tablet    Take 1 tablet (5 mg) by mouth daily    Essential hypertension, benign       buPROPion 300 MG 24 hr tablet    WELLBUTRIN XL    90 tablet    Take 1 tablet (300 mg) by mouth every morning    Mild recurrent major depression (H)       fluticasone-salmeterol 250-50 MCG/DOSE diskus inhaler    ADVAIR    3 Inhaler    Inhale 1 puff into the lungs 2 times daily    Mild persistent asthma, uncomplicated       loratadine 10 MG  tablet    CLARITIN    30 tablet    Take 1 tablet (10 mg) by mouth daily    Allergic rhinitis, mild       rosuvastatin 5 MG tablet    CRESTOR    90 tablet    TAKE ONE TABLET BY MOUTH ONE TIME DAILY    Hyperlipidemia with target LDL less than 130       venlafaxine 75 MG 24 hr capsule    EFFEXOR-XR     Take 150 mg by mouth daily        * Notice:  This list has 2 medication(s) that are the same as other medications prescribed for you. Read the directions carefully, and ask your doctor or other care provider to review them with you.

## 2018-03-29 NOTE — NURSING NOTE
"Chief Complaint   Patient presents with     Follow Up For     lipds, fasting labs       Initial /86 (BP Location: Right arm, Cuff Size: Adult Large)  Pulse 76  Temp 97.3  F (36.3  C) (Oral)  Ht 5' 10\" (1.778 m)  Wt 249 lb 12.8 oz (113.3 kg)  SpO2 96%  BMI 35.84 kg/m2 Estimated body mass index is 35.84 kg/(m^2) as calculated from the following:    Height as of this encounter: 5' 10\" (1.778 m).    Weight as of this encounter: 249 lb 12.8 oz (113.3 kg).  Medication Reconciliation: complete   Neha Weinstein MA  "

## 2018-03-30 ENCOUNTER — DOCUMENTATION ONLY (OUTPATIENT)
Dept: SLEEP MEDICINE | Facility: CLINIC | Age: 53
End: 2018-03-30
Payer: COMMERCIAL

## 2018-03-30 ASSESSMENT — ASTHMA QUESTIONNAIRES: ACT_TOTALSCORE: 20

## 2018-03-30 ASSESSMENT — PATIENT HEALTH QUESTIONNAIRE - PHQ9: SUM OF ALL RESPONSES TO PHQ QUESTIONS 1-9: 1

## 2018-03-30 NOTE — PROGRESS NOTES
3 DAY STM VISIT    Patient contacted for 3 day STM visit  Message left for patient to return call     Device type: Auto-CPAP  PAP settings from order::  CPAP min 8 cm  H20       CPAP max 12 cm  H20  Device settings from machine      Min CPAP 8.0            Max CPAP 12.0      Assessment: Nightly usage, most nights over four hours. Patient AHI elevated.   Action plan: Pt to have f/u 14 day STM visit.  Diagnostic AHI: 76.1

## 2018-04-02 NOTE — PROGRESS NOTES
Patient called back and stated his mask is jamming into his nose and its uncomfortable when he sleeps on his side. Patient would like to try a different mask. I will reach out to [facility].

## 2018-04-06 ENCOUNTER — TELEPHONE (OUTPATIENT)
Dept: SLEEP MEDICINE | Facility: CLINIC | Age: 53
End: 2018-04-06

## 2018-04-06 ENCOUNTER — APPOINTMENT (OUTPATIENT)
Dept: SLEEP MEDICINE | Facility: CLINIC | Age: 53
End: 2018-04-06
Payer: COMMERCIAL

## 2018-04-10 ENCOUNTER — DOCUMENTATION ONLY (OUTPATIENT)
Dept: SLEEP MEDICINE | Facility: CLINIC | Age: 53
End: 2018-04-10

## 2018-04-10 DIAGNOSIS — G47.33 OSA (OBSTRUCTIVE SLEEP APNEA): ICD-10-CM

## 2018-04-10 NOTE — PROGRESS NOTES
14 DAY STM VISIT    Subjective measures:   Pt is waking  Early in the morning and taking mask off and is unsure why.  He has noted the increase in AHI on his Digital Air Strike faisal as well.     Assessment: Pt not meeting objective benchmarks for AHI Patient failing following subjective benchmarks: not feeling benefit from therapy  Action plan: order placed to provider for pressure change as patient is hitting top of pressure settings.   Pt to have 30 day STM    Device type: Auto-CPAP  PAP settings:  CPAP min 8 cm  H20      CPAP max 12 cm  H20     95th% pressure 11.7 cm     Objective measures: 14 day rolling measures      Compliance  92 %      Leak  11.74 lpm  last  upload      AHI 11.3   last  upload      Average number of minutes 443     Average hours of usage 7.4    Diagnostic AHI: 76.1         Objective measure goal  Compliance   Goal >70%  Leak   Goal < 24 lpm  AHI  Goal < 5  Usage  Goal >240

## 2018-04-26 ENCOUNTER — DOCUMENTATION ONLY (OUTPATIENT)
Dept: SLEEP MEDICINE | Facility: CLINIC | Age: 53
End: 2018-04-26

## 2018-04-26 DIAGNOSIS — G47.33 OSA (OBSTRUCTIVE SLEEP APNEA): ICD-10-CM

## 2018-04-26 NOTE — PROGRESS NOTES
30 DAY STM VISIT    Diagnostic AHI: 76.1     Subjective measures:   Pt states that things are getting better.  He's able to use it longer now.  He did go back to the nasal mask instead of the pillow mask    Assessment: Pt meeting objective benchmarks.  Patient meeting subjective benchmarks.   Action plan: pt to have 6 month STM visit  Patient has a follow up visit with Bennett Goltz, PA on 5/18/18.   Device type: Auto-CPAP  PAP settings: CPAP min 8 cm  H20     CPAP max 15 cm  H20    95th% pressure 14.2 cm  H20   Objective measures: 14 day rolling measures      Compliance  85 %      Leak  21.6 lpm  last  upload      AHI 10.18   last  Upload (CA index is occasionally elevated)      Average number of minutes 457      Objective measure goal  Compliance   Goal >70%  Leak   Goal < 24 lpm  AHI  Goal < 5  Usage  Goal >240

## 2018-05-04 ENCOUNTER — OFFICE VISIT (OUTPATIENT)
Dept: SLEEP MEDICINE | Facility: CLINIC | Age: 53
End: 2018-05-04
Payer: COMMERCIAL

## 2018-05-04 VITALS
WEIGHT: 251.4 LBS | HEART RATE: 77 BPM | OXYGEN SATURATION: 97 % | HEIGHT: 70 IN | BODY MASS INDEX: 35.99 KG/M2 | RESPIRATION RATE: 16 BRPM | SYSTOLIC BLOOD PRESSURE: 138 MMHG | DIASTOLIC BLOOD PRESSURE: 83 MMHG

## 2018-05-04 DIAGNOSIS — G47.33 OSA (OBSTRUCTIVE SLEEP APNEA): ICD-10-CM

## 2018-05-04 PROCEDURE — 99214 OFFICE O/P EST MOD 30 MIN: CPT | Performed by: PHYSICIAN ASSISTANT

## 2018-05-04 NOTE — PATIENT INSTRUCTIONS

## 2018-05-04 NOTE — PROGRESS NOTES
Sleep Study Follow-Up Visit:    Date on this visit: 5/4/2018    Romelia Crystal comes in today for follow-up of his CPAP use for severe ADALGISA. He was initially seen at the [facility] for further management of previously diagnosed ADALGISA. His medical history is significant for HTN, depression, moderate asthma, hyperlipidemia, diverticulitis.     PSG here on 3/8/2018 showed:  AHI was 76.1/hr (12 of 182 events were central), with desaturations down to 86%. He spent 4.7 minutes below 90% SpO2. RDI 81.5/hr.  REM RDI N/A.  Supine RDI N/A.  Periodic Limb Movement Index 0/hour.  CPAP 9 cm was effective in REM laterally. He had centrals on CPAP 10 cm. REM supine was not observed.   He was started on auto CPAP 8-15 cm. The upper pressure limit was increased from 12 cm (on 4/10) because of a high residual AHI. He is using a nasal mask. He started with a nasal mask initially but it was irritating his nasal bridge. He switched to a nasal pillows mask (Dreamwear), but then switched back because the pressure was increased and the pillows were blown out of his nose. He liked the pillows mask otherwise. He is waking feeling the pressure is too high since the pressure was increased to 15 cm. It starts blowing out of his mouth. The pressure is near the upper limit when he wakes with mouth leak. He gets a dry mouth. He does not notice much change in his sleep with the pressure changes. He definitely notices a big difference with CPAP versus without. He is told he is more alert and pleasant. He does feel a little better energy in the day. He does feel he is always adjusting the hose when he rolls. He does not think he sleeps on his back, with rare exception.     The compliance data shows that he has used the CPAP for 29/30 nights, 83% of nights for >4 hours.  The 95th% pressure is 13.4 cm.  The 95th% leak is 15.8 lpm.  The average nightly usage is 7:19, median is 7:36.  The average AHI is 9.8/hr (2.8/hr are centrals, 5.8/hr  obstructive).      Past medical/surgical history, family history, social history, medications and allergies were reviewed.      Problem List:  Patient Active Problem List    Diagnosis Date Noted     Elevated prostate specific antigen (PSA) 03/29/2018     Priority: Medium     Morbid obesity (H) 03/29/2018     Priority: Medium     ADALGISA (obstructive sleep apnea) 03/09/2018     Priority: Medium     3/8/2018 [place] Split Sleep Study (247.0 lbs) - AHI 76.1, RDI 81.5, Supine AHI -, REM AHI -, Low O2% 85.6%, Time Spent ?88% 1.7, Time Spent ?89% 4.7. Treatment was titrated to a pressure of CPAP 10 with an AHI 17.2. Time spent in REM supine at this pressure was - minutes.       Diverticulitis 12/20/2017     Priority: Medium     Moderate persistent asthma, uncomplicated 04/11/2016     Priority: Medium     Decreased libido 04/11/2016     Priority: Medium     Essential hypertension, benign 08/05/2014     Priority: Medium     Eczema      Priority: Medium     Hives 01/29/2013     Priority: Medium     BPPV (benign paroxysmal positional vertigo) 01/29/2013     Priority: Medium     Mild recurrent major depression (H) 06/12/2012     Priority: Medium     Hyperlipidemia with target LDL less than 130      Priority: Medium     Diagnosis updated by automated process. Provider to review and confirm.       Allergic rhinitis, mild      Priority: Medium     with cough          Impression/Plan:    (G47.33) ADALGISA (obstructive sleep apnea)  Comment: Feels pressures are too high, causing mouth leak. AHI is high still, mostly obstructive.  Plan: Comprehensive DME        Changed pressures to 8-14 cm. He will go back to nasal pillows mask. I increased the EPR to 3. He will try the Snore Seal or Chin Up Strips stickers to reduce mouth leak. He was encouraged to continue to avoid supine sleep.       He will follow up with me in about 1-2 month(s).     Twenty-five minutes spent with patient, all of which were spent face-to-face counseling, consulting,  coordinating plan of care.      Bennett Goltz, PA-C    CC: No ref. provider found

## 2018-05-04 NOTE — MR AVS SNAPSHOT
After Visit Summary   5/4/2018    Romelia Crystal    MRN: [id number]           Patient Information     Date Of Birth          1965        Visit Information        Provider Department      5/4/2018 11:00 AM Goltz, Bennett Ezra, PA-C [facility]        Today's Diagnoses     ADALGISA (obstructive sleep apnea)          Care Instructions      Your BMI is Body mass index is 36.07 kg/(m^2).  Weight management is a personal decision.  If you are interested in exploring weight loss strategies, the following discussion covers the approaches that may be successful. Body mass index (BMI) is one way to tell whether you are at a healthy weight, overweight, or obese. It measures your weight in relation to your height.  A BMI of 18.5 to 24.9 is in the healthy range. A person with a BMI of 25 to 29.9 is considered overweight, and someone with a BMI of 30 or greater is considered obese. More than two-thirds of American adults are considered overweight or obese.  Being overweight or obese increases the risk for further weight gain. Excess weight may lead to heart disease and diabetes.  Creating and following plans for healthy eating and physical activity may help you improve your health.  Weight control is part of healthy lifestyle and includes exercise, emotional health, and healthy eating habits. Careful eating habits lifelong are the mainstay of weight control. Though there are significant health benefits from weight loss, long-term weight loss with diet alone may be very difficult to achieve- studies show long-term success with dietary management in less than 10% of people. Attaining a healthy weight may be especially difficult to achieve in those with severe obesity. In some cases, medications, devices and surgical management might be considered.  What can you do?  If you are overweight or obese and are interested in methods for weight loss, you should discuss this with your provider.     Consider  reducing daily calorie intake by 500 calories.     Keep a food journal.     Avoiding skipping meals, consider cutting portions instead.    Diet combined with exercise helps maintain muscle while optimizing fat loss. Strength training is particularly important for building and maintaining muscle mass. Exercise helps reduce stress, increase energy, and improves fitness. Increasing exercise without diet control, however, may not burn enough calories to loose weight.       Start walking three days a week 10-20 minutes at a time    Work towards walking thirty minutes five days a week     Eventually, increase the speed of your walking for 1-2 minutes at time    In addition, we recommend that you review healthy lifestyles and methods for weight loss available through the National Institutes of Health patient information sites:  http://win.niddk.nih.gov/publications/index.htm    And look into health and wellness programs that may be available through your health insurance provider, employer, local community center, or [employer].    Weight management plan: Patient was referred to their PCP to discuss a diet and exercise plan.            Follow-ups after your visit        Follow-up notes from your care team     Return in about 6 weeks (around 6/15/2018) for CPAP compliance recheck.      Your next 10 appointments already scheduled     Arnie 15, 2018 10:30 AM CDT   Return Sleep Patient with Bennett Ezra Goltz, PA-C   [facility] ([facility])    [address] [place] [id number] [phone]              Who to contact     If you have questions or need follow up information about today's clinic visit or your schedule please contact [facility] directly at [phone].  Normal or non-critical lab and imaging results will be communicated to you by MyChart, letter or phone within 4 business days after the clinic has received the results. If you do not  "hear from us within 7 days, please contact the clinic through [employer] or phone. If you have a critical or abnormal lab result, we will notify you by phone as soon as possible.  Submit refill requests through [employer] or call your pharmacy and they will forward the refill request to us. Please allow 3 business days for your refill to be completed.          Additional Information About Your Visit        [employer] Information     [employer] gives you secure access to your electronic health record. If you see a primary care provider, you can also send messages to your care team and make appointments. If you have questions, please call your primary care clinic.  If you do not have a primary care provider, please call [phone] and they will assist you.        Care EveryWhere ID     This is your Care EveryWhere ID. This could be used by other organizations to access your [facility] medical records  [id number]        Your Vitals Were     Pulse Respirations Height Pulse Oximetry BMI (Body Mass Index)       77 16 1.778 m (5' 10\") 97% 36.07 kg/m2        Blood Pressure from Last 3 Encounters:   05/04/18 138/83   03/29/18 128/86   03/23/18 119/80    Weight from Last 3 Encounters:   05/04/18 114 kg (251 lb 6.4 oz)   03/29/18 113.3 kg (249 lb 12.8 oz)   03/23/18 112 kg (247 lb)              We Performed the Following     Comprehensive DME          Today's Medication Changes          These changes are accurate as of 5/4/18 11:34 AM.  If you have any questions, ask your nurse or doctor.               These medicines have changed or have updated prescriptions.        Dose/Directions    * albuterol (2.5 MG/3ML) 0.083% neb solution   This may have changed:  Another medication with the same name was changed. Make sure you understand how and when to take each.        Dose:  1 vial   Take 1 vial by nebulization every 6 hours as needed for shortness of breath / dyspnea or wheezing   Refills:  0       * albuterol 108 (90 Base) MCG/ACT Inhaler "   Commonly known as:  PROAIR HFA/PROVENTIL HFA/VENTOLIN HFA   This may have changed:    - when to take this  - reasons to take this   Used for:  Mild persistent asthma, uncomplicated        Dose:  2 puff   Inhale 2 puffs into the lungs every 6 hours   Quantity:  1 Inhaler   Refills:  6       loratadine 10 MG tablet   Commonly known as:  CLARITIN   This may have changed:    - when to take this  - reasons to take this   Used for:  Allergic rhinitis, mild        Dose:  10 mg   Take 1 tablet (10 mg) by mouth daily   Quantity:  30 tablet   Refills:  0       * Notice:  This list has 2 medication(s) that are the same as other medications prescribed for you. Read the directions carefully, and ask your doctor or other care provider to review them with you.             Primary Care Provider Office Phone # Fax #    Sandra Guardado PA-C [phone] [address] [facility] [address]  [facility] [address]        Equal Access to Services     SANDEEP GRANADOS : Hadii rosenda reyeso Sorobert, waaxda lumanisha, qaybta kaalmada adeanaidyaklye, herber thompson . So [facility] [phone].    ATENCIÓN: Si habla español, tiene a salgado disposición servicios gratuitos de asistencia lingüística. Llame al [phone].    We comply with applicable federal civil rights laws and [place] laws. We do not discriminate on the basis of race, color, national origin, age, disability, sex, sexual orientation, or gender identity.            Thank you!     Thank you for choosing [facility]  for your care. Our goal is always to provide you with excellent care. Hearing back from our patients is one way we can continue to improve our services. Please take a few minutes to complete the written survey that you may receive in the mail after your visit with us. Thank you!             Your Updated Medication List - Protect others around you: Learn how to safely use, store and throw away your medicines at www.disposemymeds.org.           This list is accurate as of 5/4/18 11:34 AM.  Always use your most recent med list.                   Brand Name Dispense Instructions for use Diagnosis    * albuterol (2.5 MG/3ML) 0.083% neb solution      Take 1 vial by nebulization every 6 hours as needed for shortness of breath / dyspnea or wheezing        * albuterol 108 (90 Base) MCG/ACT Inhaler    PROAIR HFA/PROVENTIL HFA/VENTOLIN HFA    1 Inhaler    Inhale 2 puffs into the lungs every 6 hours    Mild persistent asthma, uncomplicated       amLODIPine 5 MG tablet    NORVASC    90 tablet    Take 1 tablet (5 mg) by mouth daily    Essential hypertension, benign       buPROPion 300 MG 24 hr tablet    WELLBUTRIN XL    90 tablet    Take 1 tablet (300 mg) by mouth every morning    Mild recurrent major depression (H)       fluticasone-salmeterol 250-50 MCG/DOSE diskus inhaler    ADVAIR    3 Inhaler    Inhale 1 puff into the lungs 2 times daily    Mild persistent asthma, uncomplicated       loratadine 10 MG tablet    CLARITIN    30 tablet    Take 1 tablet (10 mg) by mouth daily    Allergic rhinitis, mild       rosuvastatin 5 MG tablet    CRESTOR    90 tablet    TAKE ONE TABLET BY MOUTH ONE TIME DAILY    Hyperlipidemia with target LDL less than 130       venlafaxine 75 MG 24 hr capsule    EFFEXOR-XR     Take 150 mg by mouth daily        * Notice:  This list has 2 medication(s) that are the same as other medications prescribed for you. Read the directions carefully, and ask your doctor or other care provider to review them with you.

## 2018-06-13 ENCOUNTER — TELEPHONE (OUTPATIENT)
Dept: SLEEP MEDICINE | Facility: CLINIC | Age: 53
End: 2018-06-13

## 2018-06-14 ENCOUNTER — TELEPHONE (OUTPATIENT)
Dept: SLEEP MEDICINE | Facility: CLINIC | Age: 53
End: 2018-06-14

## 2018-06-15 ENCOUNTER — OFFICE VISIT (OUTPATIENT)
Dept: SLEEP MEDICINE | Facility: CLINIC | Age: 53
End: 2018-06-15
Payer: COMMERCIAL

## 2018-06-15 VITALS
OXYGEN SATURATION: 99 % | HEIGHT: 70 IN | BODY MASS INDEX: 35.68 KG/M2 | DIASTOLIC BLOOD PRESSURE: 83 MMHG | SYSTOLIC BLOOD PRESSURE: 124 MMHG | RESPIRATION RATE: 16 BRPM | WEIGHT: 249.2 LBS | HEART RATE: 92 BPM

## 2018-06-15 DIAGNOSIS — G47.33 OSA (OBSTRUCTIVE SLEEP APNEA): ICD-10-CM

## 2018-06-15 PROCEDURE — 99213 OFFICE O/P EST LOW 20 MIN: CPT | Performed by: PHYSICIAN ASSISTANT

## 2018-06-15 NOTE — PROGRESS NOTES
CPAP Follow-Up Visit:    Date on this visit: 6/15/2018    Romelia Crystal comes in today for follow-up of his CPAP use for severe ADALGISA. He was initially seen at the [facility] for further management of previously diagnosed ADALGISA. His medical history is significant for HTN, depression, moderate asthma, hyperlipidemia, diverticulitis.      PSG here on 3/8/2018 showed:  AHI was 76.1/hr (12 of 182 events were central), with desaturations down to 86%. He spent 4.7 minutes below 90% SpO2. RDI 81.5/hr.  REM RDI N/A.  Supine RDI N/A.  Periodic Limb Movement Index 0/hour.  CPAP 9 cm was effective in REM laterally. He had centrals on CPAP 10 cm. REM supine was not observed.   He was started on auto CPAP 8-14 cm. He was switched to a nasal pillows mask and that caused a burning feeling in his nostrils. He went back to his older mask. He is getting a burning on his skin. His face does not appear particularly irritated. He thinks he might be allergic to silicone. He had some skin irritation on his hands when working with silicone in the past. He otherwise does not have a problem with tolerating the pressure.     The compliance data shows that he has used the CPAP for 22/30 nights, 13% of nights for >4 hours.  The 95th% pressure is 12.2 cm.  The 95th% leak is 12.6 lpm.  The average nightly usage is 2:59.  The average AHI is 7.1/hr (4.1/hr obstructive, 2.3/hr centrals).      Past medical/surgical history, family history, social history, medications and allergies were reviewed.      Problem List:  Patient Active Problem List    Diagnosis Date Noted     Elevated prostate specific antigen (PSA) 03/29/2018     Priority: Medium     Morbid obesity (H) 03/29/2018     Priority: Medium     ADALGISA (obstructive sleep apnea) 03/09/2018     Priority: Medium     3/8/2018 [place] Split Sleep Study (247.0 lbs) - AHI 76.1, RDI 81.5, Supine AHI -, REM AHI -, Low O2% 85.6%, Time Spent ?88% 1.7, Time Spent ?89% 4.7. Treatment was titrated  to a pressure of CPAP 10 with an AHI 17.2. Time spent in REM supine at this pressure was - minutes.       Diverticulitis 12/20/2017     Priority: Medium     Moderate persistent asthma, uncomplicated 04/11/2016     Priority: Medium     Decreased libido 04/11/2016     Priority: Medium     Essential hypertension, benign 08/05/2014     Priority: Medium     Eczema      Priority: Medium     Hives 01/29/2013     Priority: Medium     BPPV (benign paroxysmal positional vertigo) 01/29/2013     Priority: Medium     Mild recurrent major depression (H) 06/12/2012     Priority: Medium     Hyperlipidemia with target LDL less than 130      Priority: Medium     Diagnosis updated by automated process. Provider to review and confirm.       Allergic rhinitis, mild      Priority: Medium     with cough          Impression/Plan:    (G47.33) ADALGISA (obstructive sleep apnea)  Comment: Romelia presents today to discuss skin irritation caused by the CPAP mask. He may be sensitive to silicone  Plan: We talked about options for reducing skin irritation including; CPAP cream, mask liners or a cloth mask. He will send me a note on MyChart with what he tries and how it works for him.      He will follow up with me in about 1 year(s).     Fifteen minutes spent with patient, all of which were spent face-to-face counseling, consulting, coordinating plan of care.      Bennett Goltz, PA-C    CC: No ref. provider found

## 2018-06-15 NOTE — MR AVS SNAPSHOT
After Visit Summary   6/15/2018    Romelia Crystal    MRN: [id number]           Patient Information     Date Of Birth          1965        Visit Information        Provider Department      6/15/2018 10:30 AM Goltz, Bennett Ezra, PA-C [facility]        Today's Diagnoses     ADALGISA (obstructive sleep apnea)          Care Instructions      Your BMI is Body mass index is 35.76 kg/(m^2).  Weight management is a personal decision.  If you are interested in exploring weight loss strategies, the following discussion covers the approaches that may be successful. Body mass index (BMI) is one way to tell whether you are at a healthy weight, overweight, or obese. It measures your weight in relation to your height.  A BMI of 18.5 to 24.9 is in the healthy range. A person with a BMI of 25 to 29.9 is considered overweight, and someone with a BMI of 30 or greater is considered obese. More than two-thirds of American adults are considered overweight or obese.  Being overweight or obese increases the risk for further weight gain. Excess weight may lead to heart disease and diabetes.  Creating and following plans for healthy eating and physical activity may help you improve your health.  Weight control is part of healthy lifestyle and includes exercise, emotional health, and healthy eating habits. Careful eating habits lifelong are the mainstay of weight control. Though there are significant health benefits from weight loss, long-term weight loss with diet alone may be very difficult to achieve- studies show long-term success with dietary management in less than 10% of people. Attaining a healthy weight may be especially difficult to achieve in those with severe obesity. In some cases, medications, devices and surgical management might be considered.  What can you do?  If you are overweight or obese and are interested in methods for weight loss, you should discuss this with your provider.     Consider  reducing daily calorie intake by 500 calories.     Keep a food journal.     Avoiding skipping meals, consider cutting portions instead.    Diet combined with exercise helps maintain muscle while optimizing fat loss. Strength training is particularly important for building and maintaining muscle mass. Exercise helps reduce stress, increase energy, and improves fitness. Increasing exercise without diet control, however, may not burn enough calories to loose weight.       Start walking three days a week 10-20 minutes at a time    Work towards walking thirty minutes five days a week     Eventually, increase the speed of your walking for 1-2 minutes at time    In addition, we recommend that you review healthy lifestyles and methods for weight loss available through the National Institutes of Health patient information sites:  http://win.niddk.nih.gov/publications/index.htm    And look into health and wellness programs that may be available through your health insurance provider, employer, local community center, or [employer].    Weight management plan: Patient was referred to their PCP to discuss a diet and exercise plan.            Follow-ups after your visit        Follow-up notes from your care team     Return in about 1 year (around 6/15/2019) for CPAP compliance recheck.      Who to contact     If you have questions or need follow up information about today's clinic visit or your schedule please contact [facility] directly at [phone].  Normal or non-critical lab and imaging results will be communicated to you by MyChart, letter or phone within 4 business days after the clinic has received the results. If you do not hear from us within 7 days, please contact the clinic through MyChart or phone. If you have a critical or abnormal lab result, we will notify you by phone as soon as possible.  Submit refill requests through [employer] or call your pharmacy and they will forward the refill request to  "us. Please allow 3 business days for your refill to be completed.          Additional Information About Your Visit        [email or web address] Information     [employer] gives you secure access to your electronic health record. If you see a primary care provider, you can also send messages to your care team and make appointments. If you have questions, please call your primary care clinic.  If you do not have a primary care provider, please call [phone] and they will assist you.        Care EveryWhere ID     This is your Care EveryWhere ID. This could be used by other organizations to access your [facility] medical records  [id number]        Your Vitals Were     Pulse Respirations Height Pulse Oximetry BMI (Body Mass Index)       92 16 1.778 m (5' 10\") 99% 35.76 kg/m2        Blood Pressure from Last 3 Encounters:   06/15/18 124/83   05/04/18 138/83   03/29/18 128/86    Weight from Last 3 Encounters:   06/15/18 113 kg (249 lb 3.2 oz)   05/04/18 114 kg (251 lb 6.4 oz)   03/29/18 113.3 kg (249 lb 12.8 oz)              Today, you had the following     No orders found for display         Today's Medication Changes          These changes are accurate as of 6/15/18  4:49 PM.  If you have any questions, ask your nurse or doctor.               These medicines have changed or have updated prescriptions.        Dose/Directions    * albuterol (2.5 MG/3ML) 0.083% neb solution   This may have changed:  Another medication with the same name was changed. Make sure you understand how and when to take each.        Dose:  1 vial   Take 1 vial by nebulization every 6 hours as needed for shortness of breath / dyspnea or wheezing   Refills:  0       * albuterol 108 (90 Base) MCG/ACT Inhaler   Commonly known as:  PROAIR HFA/PROVENTIL HFA/VENTOLIN HFA   This may have changed:    - when to take this  - reasons to take this   Used for:  Mild persistent asthma, uncomplicated        Dose:  2 puff   Inhale 2 puffs into the lungs every 6 hours   Quantity:  " 1 Inhaler   Refills:  6       loratadine 10 MG tablet   Commonly known as:  CLARITIN   This may have changed:    - when to take this  - reasons to take this   Used for:  Allergic rhinitis, mild        Dose:  10 mg   Take 1 tablet (10 mg) by mouth daily   Quantity:  30 tablet   Refills:  0       * Notice:  This list has 2 medication(s) that are the same as other medications prescribed for you. Read the directions carefully, and ask your doctor or other care provider to review them with you.             Primary Care Provider Office Phone # Fax #    Sandra Guardado PA-C [phone] [address] [facility] [address]  [place] [address]        Equal Access to Services     SANDEEP GRANADOS : Hadii rosenda Cuadra, waxander jo, qaybta kaalmada sam, herber hernandez. So [facility] [phone].    ATENCIÓN: Si habla español, tiene a salgado disposición servicios gratuitos de asistencia lingüística. Llame al [phone].    We comply with applicable federal civil rights laws and [place] laws. We do not discriminate on the basis of race, color, national origin, age, disability, sex, sexual orientation, or gender identity.            Thank you!     Thank you for choosing [facility]  for your care. Our goal is always to provide you with excellent care. Hearing back from our patients is one way we can continue to improve our services. Please take a few minutes to complete the written survey that you may receive in the mail after your visit with us. Thank you!             Your Updated Medication List - Protect others around you: Learn how to safely use, store and throw away your medicines at www.disposemymeds.org.          This list is accurate as of 6/15/18  4:49 PM.  Always use your most recent med list.                   Brand Name Dispense Instructions for use Diagnosis    * albuterol (2.5 MG/3ML) 0.083% neb solution      Take 1 vial by nebulization every 6 hours as  needed for shortness of breath / dyspnea or wheezing        * albuterol 108 (90 Base) MCG/ACT Inhaler    PROAIR HFA/PROVENTIL HFA/VENTOLIN HFA    1 Inhaler    Inhale 2 puffs into the lungs every 6 hours    Mild persistent asthma, uncomplicated       amLODIPine 5 MG tablet    NORVASC    90 tablet    Take 1 tablet (5 mg) by mouth daily    Essential hypertension, benign       buPROPion 300 MG 24 hr tablet    WELLBUTRIN XL    90 tablet    Take 1 tablet (300 mg) by mouth every morning    Mild recurrent major depression (H)       fluticasone-salmeterol 250-50 MCG/DOSE diskus inhaler    ADVAIR    3 Inhaler    Inhale 1 puff into the lungs 2 times daily    Mild persistent asthma, uncomplicated       loratadine 10 MG tablet    CLARITIN    30 tablet    Take 1 tablet (10 mg) by mouth daily    Allergic rhinitis, mild       rosuvastatin 5 MG tablet    CRESTOR    90 tablet    TAKE ONE TABLET BY MOUTH ONE TIME DAILY    Hyperlipidemia with target LDL less than 130       venlafaxine 75 MG 24 hr capsule    EFFEXOR-XR     Take 150 mg by mouth daily        * Notice:  This list has 2 medication(s) that are the same as other medications prescribed for you. Read the directions carefully, and ask your doctor or other care provider to review them with you.

## 2018-06-15 NOTE — NURSING NOTE
"Chief Complaint   Patient presents with     CPAP Follow Up       Initial /83  Pulse 92  Resp 16  Ht 1.778 m (5' 10\")  Wt 113 kg (249 lb 3.2 oz)  SpO2 99%  BMI 35.76 kg/m2 Estimated body mass index is 35.76 kg/(m^2) as calculated from the following:    Height as of this encounter: 1.778 m (5' 10\").    Weight as of this encounter: 113 kg (249 lb 3.2 oz).    Medication Reconciliation: complete     ESS 5  Radha Lester        "

## 2018-06-15 NOTE — PATIENT INSTRUCTIONS

## 2018-07-13 DIAGNOSIS — E78.5 HYPERLIPIDEMIA WITH TARGET LDL LESS THAN 130: ICD-10-CM

## 2018-07-16 ENCOUNTER — MYC MEDICAL ADVICE (OUTPATIENT)
Dept: FAMILY MEDICINE | Facility: CLINIC | Age: 53
End: 2018-07-16

## 2018-07-16 DIAGNOSIS — E78.5 HYPERLIPIDEMIA WITH TARGET LDL LESS THAN 130: Primary | ICD-10-CM

## 2018-07-16 NOTE — TELEPHONE ENCOUNTER
"Rosuvastatin 5 mg    Last Written Prescription Date:  03/19/18  Last Fill Quantity: 90 tablets,  # refills: 0   Last office visit: 3/29/2018 with prescribing provider:  Sandra Guardado   Future Office Visit:      Requested Prescriptions   Pending Prescriptions Disp Refills     rosuvastatin (CRESTOR) 5 MG tablet [Pharmacy Med Name: Rosuvastatin Calcium Oral Tablet 5 MG] 90 tablet 0     Sig: TAKE ONE TABLET BY MOUTH ONE TIME DAILY    Statins Protocol Passed    7/13/2018  3:58 PM       Passed - LDL on file in past 12 months    Recent Labs   Lab Test  03/29/18   1032   LDL  164*            Passed - No abnormal creatine kinase in past 12 months    Recent Labs   Lab Test  07/26/17   0808   CKT  68               Passed - Recent (12 mo) or future (30 days) visit within the authorizing provider's specialty    Patient had office visit in the last 12 months or has a visit in the next 30 days with authorizing provider or within the authorizing provider's specialty.  See \"Patient Info\" tab in inbasket, or \"Choose Columns\" in Meds & Orders section of the refill encounter.           Passed - Patient is age 18 or older          "

## 2018-07-16 NOTE — TELEPHONE ENCOUNTER
To PCP:    Sent pt a [employer] message regarding how well he is tolerating the new Crestor RX, per your request during his last OV.     Once pt responds will route to you for review.    Thank you,  William MCDONALD RN

## 2018-07-17 RX ORDER — ROSUVASTATIN CALCIUM 5 MG/1
TABLET, COATED ORAL
Qty: 90 TABLET | Refills: 0 | Status: SHIPPED | OUTPATIENT
Start: 2018-07-17 | End: 2018-11-07

## 2018-07-23 ENCOUNTER — DOCUMENTATION ONLY (OUTPATIENT)
Dept: SLEEP MEDICINE | Facility: CLINIC | Age: 53
End: 2018-07-23

## 2018-07-23 DIAGNOSIS — G47.33 OSA (OBSTRUCTIVE SLEEP APNEA): ICD-10-CM

## 2018-07-23 NOTE — PROGRESS NOTES
STM recheck     Diagnostic AHI: 76.1       Message left for patient to return call     Assessment: Pt not meeting objective benchmarks for AHI and compliance     Action plan: waiting for patient to return call.     Device type: Auto-CPAP  PAP settings: CPAP min 8.0 cm  H20     CPAP max 14.0 cm  H20      95th% pressure 13.5 cm   Mask type:  Nasal Mask  Objective measures: 14 day rolling measures      Compliance  0 %      Leak  16.2 lpm  last  upload      AHI 9.3   last  upload      Average number of minutes 6     Average hours of usage 0.1          Objective measure goal  Compliance   Goal >70%  Leak   Goal < 24 lpm  AHI  Goal < 5  Usage  Goal >240

## 2018-08-07 ENCOUNTER — MYC MEDICAL ADVICE (OUTPATIENT)
Dept: FAMILY MEDICINE | Facility: CLINIC | Age: 53
End: 2018-08-07

## 2018-08-07 DIAGNOSIS — J45.30 MILD PERSISTENT ASTHMA, UNCOMPLICATED: ICD-10-CM

## 2018-08-07 NOTE — TELEPHONE ENCOUNTER
"Requested Prescriptions   Pending Prescriptions Disp Refills     albuterol (PROAIR HFA/PROVENTIL HFA/VENTOLIN HFA) 108 (90 Base) MCG/ACT Inhaler 3 Inhaler 1    Last Written Prescription Date:  7/25/17  Last Fill Quantity: 1,  # refills: 6   Last office visit: 3/29/2018 with prescribing provider:  Debby   Future Office Visit:    Sig: Inhale 2 puffs into the lungs every 6 hours as needed    Asthma Maintenance Inhalers - Anticholinergics Passed    8/7/2018  4:42 PM       Passed - Patient is age 12 years or older       Passed - Asthma control assessment score within normal limits in last 6 months    Please review ACT score.          Passed - Recent (6 mo) or future (30 days) visit within the authorizing provider's specialty    Patient had office visit in the last 6 months or has a visit in the next 30 days with authorizing provider or within the authorizing provider's specialty.  See \"Patient Info\" tab in inbasket, or \"Choose Columns\" in Meds & Orders section of the refill encounter.              "

## 2018-08-07 NOTE — TELEPHONE ENCOUNTER
See patient's message - she is leaving for [place] next month and is wishing to have multiple inhalers with her.  I changed dispensing from #1inhaler R6 to #3inhaler R1 so mail order will send her multiple inhalers.    Nicole Wayne, RT (R)

## 2018-08-08 RX ORDER — ALBUTEROL SULFATE 90 UG/1
2 AEROSOL, METERED RESPIRATORY (INHALATION) EVERY 6 HOURS PRN
Qty: 3 INHALER | Refills: 0 | Status: SHIPPED | OUTPATIENT
Start: 2018-08-08

## 2018-08-08 NOTE — TELEPHONE ENCOUNTER
Prescription approved per [facility] Refill Protocol.  Refill request fax never received from pharmacy  Pt informed Rx sent  Elham WALTERS RN

## 2018-08-11 ENCOUNTER — APPOINTMENT (OUTPATIENT)
Dept: GENERAL RADIOLOGY | Facility: CLINIC | Age: 53
End: 2018-08-11
Attending: EMERGENCY MEDICINE
Payer: COMMERCIAL

## 2018-08-11 ENCOUNTER — HOSPITAL ENCOUNTER (EMERGENCY)
Facility: CLINIC | Age: 53
Discharge: HOME | End: 2018-08-11
Attending: EMERGENCY MEDICINE | Admitting: EMERGENCY MEDICINE
Payer: COMMERCIAL

## 2018-08-11 VITALS
HEIGHT: 70 IN | RESPIRATION RATE: 18 BRPM | TEMPERATURE: 99.4 F | OXYGEN SATURATION: 94 % | BODY MASS INDEX: 33.93 KG/M2 | WEIGHT: 237 LBS | SYSTOLIC BLOOD PRESSURE: 148 MMHG | DIASTOLIC BLOOD PRESSURE: 91 MMHG

## 2018-08-11 DIAGNOSIS — J20.9 ACUTE BRONCHITIS, UNSPECIFIED ORGANISM: ICD-10-CM

## 2018-08-11 DIAGNOSIS — J45.901 MODERATE ASTHMA WITH EXACERBATION, UNSPECIFIED WHETHER PERSISTENT: ICD-10-CM

## 2018-08-11 PROCEDURE — 25000125 ZZHC RX 250: Performed by: EMERGENCY MEDICINE

## 2018-08-11 PROCEDURE — 99285 EMERGENCY DEPT VISIT HI MDM: CPT | Mod: 25

## 2018-08-11 PROCEDURE — 94640 AIRWAY INHALATION TREATMENT: CPT

## 2018-08-11 PROCEDURE — 71046 X-RAY EXAM CHEST 2 VIEWS: CPT

## 2018-08-11 PROCEDURE — 25000128 H RX IP 250 OP 636: Performed by: EMERGENCY MEDICINE

## 2018-08-11 PROCEDURE — 96374 THER/PROPH/DIAG INJ IV PUSH: CPT

## 2018-08-11 RX ORDER — METHYLPREDNISOLONE SODIUM SUCCINATE 125 MG/2ML
125 INJECTION, POWDER, LYOPHILIZED, FOR SOLUTION INTRAMUSCULAR; INTRAVENOUS ONCE
Status: COMPLETED | OUTPATIENT
Start: 2018-08-11 | End: 2018-08-11

## 2018-08-11 RX ORDER — PREDNISONE 20 MG/1
TABLET ORAL
Qty: 12 TABLET | Refills: 0 | Status: SHIPPED | OUTPATIENT
Start: 2018-08-11 | End: 2018-08-21

## 2018-08-11 RX ORDER — ALBUTEROL SULFATE 0.83 MG/ML
5 SOLUTION RESPIRATORY (INHALATION) EVERY 4 HOURS PRN
Qty: 1 BOX | Refills: 0 | Status: SHIPPED | OUTPATIENT
Start: 2018-08-11

## 2018-08-11 RX ORDER — PREDNISONE 20 MG/1
60 TABLET ORAL ONCE
Status: COMPLETED | OUTPATIENT
Start: 2018-08-11 | End: 2018-08-11

## 2018-08-11 RX ORDER — ALBUTEROL SULFATE 0.83 MG/ML
2.5 SOLUTION RESPIRATORY (INHALATION) ONCE
Status: COMPLETED | OUTPATIENT
Start: 2018-08-11 | End: 2018-08-11

## 2018-08-11 RX ORDER — IPRATROPIUM BROMIDE AND ALBUTEROL SULFATE 2.5; .5 MG/3ML; MG/3ML
3 SOLUTION RESPIRATORY (INHALATION) ONCE
Status: COMPLETED | OUTPATIENT
Start: 2018-08-11 | End: 2018-08-11

## 2018-08-11 RX ORDER — ALBUTEROL SULFATE 0.83 MG/ML
5 SOLUTION RESPIRATORY (INHALATION) ONCE
Status: COMPLETED | OUTPATIENT
Start: 2018-08-11 | End: 2018-08-11

## 2018-08-11 RX ADMIN — IPRATROPIUM BROMIDE AND ALBUTEROL SULFATE 3 ML: .5; 3 SOLUTION RESPIRATORY (INHALATION) at 11:30

## 2018-08-11 RX ADMIN — ALBUTEROL SULFATE 2.5 MG: 2.5 SOLUTION RESPIRATORY (INHALATION) at 11:30

## 2018-08-11 RX ADMIN — ALBUTEROL SULFATE 5 MG: 2.5 SOLUTION RESPIRATORY (INHALATION) at 12:45

## 2018-08-11 RX ADMIN — METHYLPREDNISOLONE SODIUM SUCCINATE 125 MG: 125 INJECTION, POWDER, FOR SOLUTION INTRAMUSCULAR; INTRAVENOUS at 11:32

## 2018-08-11 RX ADMIN — PREDNISONE 60 MG: 20 TABLET ORAL at 12:45

## 2018-08-11 ASSESSMENT — ENCOUNTER SYMPTOMS
FEVER: 0
SORE THROAT: 0
COUGH: 1

## 2018-08-11 NOTE — ED PROVIDER NOTES
"  History     Chief Complaint:  Cough      HPI   Romelia Crystal is a 53 year old male, with a history of asthma, allergic rhinitis, and hypertension, who presents with asthma. Patient states two days ago his asthma flared up and he developed a cough. He noted his inhaler and nebulizer were not providing relief. Patient also notes having come congestion, with no fevers or sore throat. He took two Prednisone's this morning.         Allergies:  Hmg-Coa-R inhibitors  Lisinopril     Medications:    Albuterol  Norvasc  Wellbutrin   Advair  Claritin  Crestor   Venlafaxine     Past Medical History:    Allergic rhinitis, mild   Eczema   HTN (hypertension), benign   Hyperlipidemia LDL goal < 130   Mild persistent asthma   Mild recurrent major depression (H)   Elevated prostate specific antigen (PSA)   Morbid obesity (H)   ADALGISA (obstructive sleep apnea)   Diverticulitis   Decreased libido   Hives  BPPV (benign paroxysmal positional vertigo)      Past Surgical History:    Tonsillectomy     Family History:    Lipids  Cancer  Diverticulitis     Social History:  The patient was accompanied to the ED by family member  Smoking Status: No  Smokeless Tobacco: No  Alcohol Use: Yes  Marital Status:   [2]      Review of Systems   Constitutional: Negative for fever.   HENT: Positive for congestion. Negative for sore throat.    Respiratory: Positive for cough.    All other systems reviewed and are negative.    Physical Exam   Vitals:  Patient Vitals for the past 24 hrs:   BP Temp Temp src Heart Rate Resp SpO2 Height Weight   08/11/18 1115 146/88 99.4  F (37.4  C) Oral 107 20 95 % 1.778 m (5' 10\") 107.5 kg (237 lb)         Physical Exam  Nursing note and vitals reviewed.  Constitutional:  Appears well-developed and well-nourished.   HENT:   Head:    Atraumatic. TM's clear.  Mouth/Throat:   Oropharynx is clear and moist. No oropharyngeal exudate.   Eyes:    Pupils are equal, round, and reactive to light.   Neck:    Normal range of " motion. Neck supple.      No tracheal deviation present. No thyromegaly present.   Cardiovascular:  Normal rate, regular rhythm, no murmur   Pulmonary/Chest: Expiratory wheezes bilaterally and prolonged expiratory phase.  Abdominal:   Soft. Bowel sounds are normal. Exhibits no distension and      no mass. There is no tenderness.      There is no rebound and no guarding.   Musculoskeletal:  Exhibits no edema.   Lymphadenopathy:  No cervical adenopathy.   Neurological:   Alert and oriented to person, place, and time.   Skin:    Skin is warm and dry. No rash noted. No pallor.       Emergency Department Course     Imaging:  Radiology findings were communicated with the patient who voiced understanding of the findings.  Chest XR, PA & LAT  IMPRESSION: No acute cardiopulmonary abnormalities.  Reading per radiology.    Interventions:  1130 Albuterol 3 mLs Nebulization  1132 Methylprednisolone IV  1130 Albuterol 2.5 mg Nebulization   1245 Prednisone 60 mg Oral  1245 Albuterol 5 mg Nebulization      Emergency Department Course:  Nursing notes and vitals reviewed.  I performed an exam of the patient as documented above.   The patient was sent for a XR of chest while in the emergency department, results above.     1227 check in with the patient.  Wheezing has substantially improved, but few wheezes still present.  12:45:  Given a second nebulizer Tx.  Feeling improved.    I personally answered all related questions prior to discharge.    Findings and plan explained to the patient. Patient discharged home with instructions regarding supportive care, medications, and reasons to return. The importance of close follow-up was reviewed.     Impression & Plan      Medical Decision Making:  Romelia Crystal is a 53 year old male who presents for evaluation of shortness of breath and wheezing. Signs and symptoms are consistent with asthma exacerbation. A broad differential was considered including foreign body, asthma, reactive airway  disease, pneumothorax, cardiac equivalent, viral induced wheezing, allergic phenomena, etc. Patient feels improved after interventions here in ED. There are no signs at this point of any serious etiologies including those mentioned above.  No indication for hospitalization at this time including no hypoxia, no marked increase in respiratory rate, minimal to no retractions.  Supportive outpatient management is indicated, medications for discharge noted above. Close followup with primary care physician.  Return if increased wheezing, progressive shortness of breath, develops fever.      Diagnosis:    ICD-10-CM    1. Moderate asthma with exacerbation, unspecified whether persistent J45.901    2. Acute bronchitis, unspecified organism J20.9        Disposition:   Discharged    CMS Diagnoses: None     Discharge Medications:  New Prescriptions    ALBUTEROL (2.5 MG/3ML) 0.083% NEB SOLUTION    Take 2 vials (5 mg) by nebulization every 4 hours as needed for shortness of breath / dyspnea or wheezing    PREDNISONE (DELTASONE) 20 MG TABLET    3 tabs on the first day, then 2 tabs days 2-4, then 1 tab days 5-6, then 1/2 tab days 7-8     Scribe Disclosure:  Ty SIDHU, am serving as a scribe at 11:19 AM on 8/11/2018 to document services personally performed by Jen Mcdonald MD, based on my observations and the provider's statements to me.   EMERGENCY DEPARTMENT       Jen Mcdonald MD  08/11/18 5316

## 2018-08-11 NOTE — ED AVS SNAPSHOT
Emergency Department    [address] [facility] [address]    Phone:  [phone]    Fax:  [phone]                                       Romelia Crystal   MRN: [id number]    Department:   Emergency Department   Date of Visit:  8/11/2018           After Visit Summary Signature Page     I have received my discharge instructions, and my questions have been answered. I have discussed any challenges I see with this plan with the nurse or doctor.    ..........................................................................................................................................  Patient/Patient Representative Signature      ..........................................................................................................................................  Patient Representative Print Name and Relationship to Patient    ..................................................               ................................................  Date                                            Time    ..........................................................................................................................................  Reviewed by Signature/Title    ...................................................              ..............................................  Date                                                            Time

## 2018-08-11 NOTE — ED AVS SNAPSHOT
Emergency Department    [address] [facility] [address]    Phone:  [phone]    Fax:  [phone]                                       Romelia Crystal   MRN: [id number]    Department:   Emergency Department   Date of Visit:  8/11/2018           Patient Information     Date Of Birth          1965        Your diagnoses for this visit were:     Moderate asthma with exacerbation, unspecified whether persistent     Acute bronchitis, unspecified organism        You were seen by Jen Mcdonald MD.      Follow-up Information     Follow up with  Emergency Department.    Specialty:  EMERGENCY MEDICINE    Why:  As needed, If symptoms worsen    Contact information:    [address] [facility] [address] [phone]        Follow up with Sandra Guardado PA-C.    Specialty:  Internal Medicine    Why:  Monday for recheck    Contact information:    [address] [id number] [phone]        Discharge References/Attachments     ASTHMA, ACUTE (ADULT) (ENGLISH)    BRONCHITIS WITH WHEEZING (ADULT) (ENGLISH)      24 Hour Appointment Hotline       To make an appointment at any [facility], call [phone] ([phone]). If you don't have a family doctor or clinic, we will help you find one. [facility] are conveniently located to serve the needs of you and your family.             Review of your medicines      START taking        Dose / Directions Last dose taken    predniSONE 20 MG tablet   Commonly known as:  DELTASONE   Quantity:  12 tablet        3 tabs on the first day, then 2 tabs days 2-4, then 1 tab days 5-6, then 1/2 tab days 7-8   Refills:  0          Our records show that you are taking the medicines listed below. If these are incorrect, please call your family doctor or clinic.        Dose / Directions Last dose taken    amLODIPine 5 MG tablet   Commonly known as:  NORVASC   Dose:  5 mg   Quantity:  90 tablet        Take 1 tablet (5  mg) by mouth daily   Refills:  3        buPROPion 300 MG 24 hr tablet   Commonly known as:  WELLBUTRIN XL   Dose:  300 mg   Quantity:  90 tablet        Take 1 tablet (300 mg) by mouth every morning   Refills:  1        fluticasone-salmeterol 250-50 MCG/DOSE diskus inhaler   Commonly known as:  ADVAIR   Dose:  1 puff   Quantity:  3 Inhaler        Inhale 1 puff into the lungs 2 times daily   Refills:  3        loratadine 10 MG tablet   Commonly known as:  CLARITIN   Dose:  10 mg   Quantity:  30 tablet        Take 1 tablet (10 mg) by mouth daily   Refills:  0        * rosuvastatin 5 MG tablet   Commonly known as:  CRESTOR   Quantity:  90 tablet        TAKE ONE TABLET BY MOUTH ONE TIME DAILY   Refills:  0        * rosuvastatin 5 MG tablet   Commonly known as:  CRESTOR   Quantity:  90 tablet        TAKE ONE TABLET BY MOUTH ONE TIME DAILY   Refills:  0        venlafaxine 75 MG 24 hr capsule   Commonly known as:  EFFEXOR-XR   Dose:  150 mg        Take 150 mg by mouth daily   Refills:  0        * Notice:  This list has 2 medication(s) that are the same as other medications prescribed for you. Read the directions carefully, and ask your doctor or other care provider to review them with you.      ASK your doctor about these medications        Dose / Directions Last dose taken    * albuterol (2.5 MG/3ML) 0.083% neb solution   Dose:  1 vial   What changed:  Another medication with the same name was added. Make sure you understand how and when to take each.   Ask about: Which instructions should I use?        Take 1 vial by nebulization every 6 hours as needed for shortness of breath / dyspnea or wheezing   Refills:  0        * albuterol 108 (90 Base) MCG/ACT inhaler   Commonly known as:  PROAIR HFA/PROVENTIL HFA/VENTOLIN HFA   Dose:  2 puff   What changed:  Another medication with the same name was added. Make sure you understand how and when to take each.   Quantity:  3 Inhaler   Ask about: Which instructions should I use?         Inhale 2 puffs into the lungs every 6 hours as needed   Refills:  0        * albuterol (2.5 MG/3ML) 0.083% neb solution   Dose:  5 mg   What changed:  You were already taking a medication with the same name, and this prescription was added. Make sure you understand how and when to take each.   Quantity:  1 Box   Ask about: Which instructions should I use?        Take 2 vials (5 mg) by nebulization every 4 hours as needed for shortness of breath / dyspnea or wheezing   Refills:  0        * Notice:  This list has 3 medication(s) that are the same as other medications prescribed for you. Read the directions carefully, and ask your doctor or other care provider to review them with you.            Prescriptions were sent or printed at these locations (2 Prescriptions)                   Other Prescriptions                Printed at Department/Unit printer (2 of 2)         albuterol (2.5 MG/3ML) 0.083% neb solution               predniSONE (DELTASONE) 20 MG tablet                Procedures and tests performed during your visit     Chest XR,  PA & LAT      Orders Needing Specimen Collection     None      Pending Results     No orders found from 8/9/2018 to 8/12/2018.            Pending Culture Results     No orders found from 8/9/2018 to 8/12/2018.            Pending Results Instructions     If you had any lab results that were not finalized at the time of your Discharge, you can call the ED Lab Result RN at [phone]. You will be contacted by this team for any positive Lab results or changes in treatment. The nurses are available 7 days a week from 10A to 6:30P.  You can leave a message 24 hours per day and they will return your call.        Test Results From Your Hospital Stay        8/11/2018 12:20 PM      Narrative     CHEST TWO VIEWS  8/11/2018 12:00 PM     HISTORY:  Asthma attack and cough, check for pneumonia or  pneumothorax.     COMPARISON: 1/14/2017.     FINDINGS: Cardiomediastinal silhouette within normal  limits. No focal  airspace opacities. No pleural effusion. No pneumothorax identified.  The bones are unremarkable.         Impression     IMPRESSION: No acute cardiopulmonary abnormalities.    ALLEN HAYES MD                Clinical Quality Measure: Blood Pressure Screening     Your blood pressure was checked while you were in the emergency department today. The last reading we obtained was  BP: (!) 148/91 . Please read the guidelines below about what these numbers mean and what you should do about them.  If your systolic blood pressure (the top number) is less than 120 and your diastolic blood pressure (the bottom number) is less than 80, then your blood pressure is normal. There is nothing more that you need to do about it.  If your systolic blood pressure (the top number) is 120-139 or your diastolic blood pressure (the bottom number) is 80-89, your blood pressure may be higher than it should be. You should have your blood pressure rechecked within a year by a primary care provider.  If your systolic blood pressure (the top number) is 140 or greater or your diastolic blood pressure (the bottom number) is 90 or greater, you may have high blood pressure. High blood pressure is treatable, but if left untreated over time it can put you at risk for heart attack, stroke, or kidney failure. You should have your blood pressure rechecked by a primary care provider within the next 4 weeks.  If your provider in the emergency department today gave you specific instructions to follow-up with your doctor or provider even sooner than that, you should follow that instruction and not wait for up to 4 weeks for your follow-up visit.        Thank you for choosing [facility]       Thank you for choosing [facility] for your care. Our goal is always to provide you with excellent care. Hearing back from our patients is one way we can continue to improve our services. Please take a few minutes to complete the written survey that you may  receive in the mail after you visit with us. Thank you!        [facility] Information     [email or web address] gives you secure access to your electronic health record. If you see a primary care provider, you can also send messages to your care team and make appointments. If you have questions, please call your primary care clinic.  If you do not have a primary care provider, please call [phone] and they will assist you.        Care EveryWhere ID     This is your Care EveryWhere ID. This could be used by other organizations to access your [place] medical records  [id number]        Equal Access to Services     [facility] : Amanda Cuadra, alley jo, jose ramon vargas, herber thompson . So [facility] [phone].    ATENCIÓN: Si habla español, tiene a salgado disposición servicios gratuitos de asistencia lingüística. Llame al [phone].    We comply with applicable federal civil rights laws and [place] laws. We do not discriminate on the basis of race, color, national origin, age, disability, sex, sexual orientation, or gender identity.            After Visit Summary       This is your record. Keep this with you and show to your community pharmacist(s) and doctor(s) at your next visit.

## 2018-08-13 ENCOUNTER — MYC MEDICAL ADVICE (OUTPATIENT)
Dept: FAMILY MEDICINE | Facility: CLINIC | Age: 53
End: 2018-08-13

## 2018-08-13 DIAGNOSIS — J98.01 ACUTE BRONCHOSPASM: Primary | ICD-10-CM

## 2018-08-14 RX ORDER — ALBUTEROL SULFATE 0.83 MG/ML
2.5 SOLUTION RESPIRATORY (INHALATION) EVERY 6 HOURS PRN
Qty: 360 ML | Refills: 0 | Status: SHIPPED | OUTPATIENT
Start: 2018-08-14 | End: 2018-11-27

## 2018-08-14 NOTE — TELEPHONE ENCOUNTER
To PCP:     Please see pt's original [employer] Message requesting refills on [employer] for [place] travels. Pended.     Thank you,   Dinora CAZARES RN

## 2018-08-30 ENCOUNTER — OFFICE VISIT (OUTPATIENT)
Dept: FAMILY MEDICINE | Facility: CLINIC | Age: 53
End: 2018-08-30
Payer: COMMERCIAL

## 2018-08-30 ENCOUNTER — RADIANT APPOINTMENT (OUTPATIENT)
Dept: GENERAL RADIOLOGY | Facility: CLINIC | Age: 53
End: 2018-08-30
Attending: NURSE PRACTITIONER
Payer: COMMERCIAL

## 2018-08-30 VITALS
DIASTOLIC BLOOD PRESSURE: 84 MMHG | SYSTOLIC BLOOD PRESSURE: 126 MMHG | BODY MASS INDEX: 35.22 KG/M2 | WEIGHT: 246 LBS | HEIGHT: 70 IN | HEART RATE: 82 BPM | OXYGEN SATURATION: 98 %

## 2018-08-30 DIAGNOSIS — J45.41 MODERATE PERSISTENT ASTHMA WITH EXACERBATION: ICD-10-CM

## 2018-08-30 DIAGNOSIS — M10.9 ACUTE GOUTY ARTHRITIS: Primary | ICD-10-CM

## 2018-08-30 DIAGNOSIS — M10.9 ACUTE GOUTY ARTHRITIS: ICD-10-CM

## 2018-08-30 LAB
BASOPHILS # BLD AUTO: 0 10E9/L (ref 0–0.2)
BASOPHILS NFR BLD AUTO: 0.6 %
DIFFERENTIAL METHOD BLD: NORMAL
EOSINOPHIL # BLD AUTO: 0.3 10E9/L (ref 0–0.7)
EOSINOPHIL NFR BLD AUTO: 4.8 %
LYMPHOCYTES # BLD AUTO: 1.9 10E9/L (ref 0.8–5.3)
LYMPHOCYTES NFR BLD AUTO: 28.3 %
MONOCYTES # BLD AUTO: 0.6 10E9/L (ref 0–1.3)
MONOCYTES NFR BLD AUTO: 9.3 %
NEUTROPHILS # BLD AUTO: 3.8 10E9/L (ref 1.6–8.3)
NEUTROPHILS NFR BLD AUTO: 57 %
WBC # BLD AUTO: 6.7 10E9/L (ref 4–11)

## 2018-08-30 PROCEDURE — 80048 BASIC METABOLIC PNL TOTAL CA: CPT | Performed by: NURSE PRACTITIONER

## 2018-08-30 PROCEDURE — 84550 ASSAY OF BLOOD/URIC ACID: CPT | Performed by: NURSE PRACTITIONER

## 2018-08-30 PROCEDURE — 73660 X-RAY EXAM OF TOE(S): CPT | Mod: LT

## 2018-08-30 PROCEDURE — 36415 COLL VENOUS BLD VENIPUNCTURE: CPT | Performed by: NURSE PRACTITIONER

## 2018-08-30 PROCEDURE — 85004 AUTOMATED DIFF WBC COUNT: CPT | Performed by: NURSE PRACTITIONER

## 2018-08-30 PROCEDURE — 85048 AUTOMATED LEUKOCYTE COUNT: CPT | Performed by: NURSE PRACTITIONER

## 2018-08-30 PROCEDURE — 99214 OFFICE O/P EST MOD 30 MIN: CPT | Performed by: NURSE PRACTITIONER

## 2018-08-30 RX ORDER — METHYLPREDNISOLONE 4 MG
TABLET, DOSE PACK ORAL
Qty: 21 TABLET | Refills: 0 | Status: SHIPPED | OUTPATIENT
Start: 2018-08-30 | End: 2018-11-27

## 2018-08-30 RX ORDER — MONTELUKAST SODIUM 10 MG/1
10 TABLET ORAL AT BEDTIME
Qty: 30 TABLET | Refills: 1 | Status: SHIPPED | OUTPATIENT
Start: 2018-08-30

## 2018-08-30 RX ORDER — INDOMETHACIN 25 MG/1
25 CAPSULE ORAL 2 TIMES DAILY WITH MEALS
Qty: 42 CAPSULE | Refills: 1 | Status: SHIPPED | OUTPATIENT
Start: 2018-08-30 | End: 2018-11-27

## 2018-08-30 NOTE — PROGRESS NOTES
SUBJECTIVE:   Romelia Crystal is a 53 year old male who presents to clinic today for the following health issues:      Chief Complaint   Patient presents with     Follow Up For     Asthma related cough that has not gone away since ER visit on 8/11/18, given course of prednisone but sx persisted, using albuterol neb at home and MDI at work, using advair bid,      Toe Pain     2 days ago patient woke up with redness, warmth, and a lot of pain in L foot 2nd digit, h/o, no family hx gout   Traveling to [facility] in a week      Problem list and histories reviewed & adjusted, as indicated.  Additional history: as documented    Patient Active Problem List   Diagnosis     Hyperlipidemia with target LDL less than 130     Mild recurrent major depression (H)     Allergic rhinitis, mild     Hives     BPPV (benign paroxysmal positional vertigo)     Eczema     Essential hypertension, benign     Moderate persistent asthma, uncomplicated     Decreased libido     Diverticulitis     ADALGISA (obstructive sleep apnea)     Elevated prostate specific antigen (PSA)     Morbid obesity (H)     Past Surgical History:   Procedure Laterality Date     TONSILLECTOMY       wisdom teeth         Social History   Substance Use Topics     Smoking status: Never Smoker     Smokeless tobacco: Never Used     Alcohol use 0.0 oz/week     0 Standard drinks or equivalent per week      Comment: 2-3 beers on wkend     Family History   Problem Relation Age of Onset     Lipids Father      Cancer Father 50     lymphoma     Family History Negative Mother 74     Other - See Comments Brother      diverticulitis         Current Outpatient Prescriptions   Medication Sig Dispense Refill     indomethacin (INDOCIN) 25 MG capsule Take 1 capsule (25 mg) by mouth 2 times daily (with meals) Decrease the dose to one pill daily as pain resolves and then every other day and then stop 42 capsule 1     methylPREDNISolone (MEDROL DOSEPAK) 4 MG tablet Follow package instructions 21  "tablet 0     montelukast (SINGULAIR) 10 MG tablet Take 1 tablet (10 mg) by mouth At Bedtime 30 tablet 1     albuterol (2.5 MG/3ML) 0.083% neb solution Take 1 vial (2.5 mg) by nebulization every 6 hours as needed for shortness of breath / dyspnea or wheezing 360 mL 0     albuterol (2.5 MG/3ML) 0.083% neb solution Take 2 vials (5 mg) by nebulization every 4 hours as needed for shortness of breath / dyspnea or wheezing 1 Box 0     albuterol (PROAIR HFA/PROVENTIL HFA/VENTOLIN HFA) 108 (90 Base) MCG/ACT Inhaler Inhale 2 puffs into the lungs every 6 hours as needed 3 Inhaler 0     amLODIPine (NORVASC) 5 MG tablet Take 1 tablet (5 mg) by mouth daily 90 tablet 3     buPROPion (WELLBUTRIN XL) 300 MG 24 hr tablet Take 1 tablet (300 mg) by mouth every morning 90 tablet 1     fluticasone-salmeterol (ADVAIR) 250-50 MCG/DOSE diskus inhaler Inhale 1 puff into the lungs 2 times daily 3 Inhaler 3     loratadine (CLARITIN) 10 MG tablet Take 1 tablet (10 mg) by mouth daily (Patient taking differently: Take 10 mg by mouth as needed ) 30 tablet 0     rosuvastatin (CRESTOR) 5 MG tablet TAKE ONE TABLET BY MOUTH ONE TIME DAILY  90 tablet 0     rosuvastatin (CRESTOR) 5 MG tablet TAKE ONE TABLET BY MOUTH ONE TIME DAILY  90 tablet 0     venlafaxine (EFFEXOR-XR) 75 MG 24 hr capsule Take 150 mg by mouth daily        Allergies   Allergen Reactions     Hmg-Coa-R Inhibitors Other (See Comments)     Some Statins Muscle aches      Lisinopril Cough       Reviewed and updated as needed this visit by clinical staff       Reviewed and updated as needed this visit by Provider         ROS:  Constitutional, HEENT, cardiovascular, pulmonary, gi and gu systems are negative, except as otherwise noted.    OBJECTIVE:     /84  Pulse 82  Ht 5' 10\" (1.778 m)  Wt 246 lb (111.6 kg)  SpO2 98%  BMI 35.3 kg/m2  Body mass index is 35.3 kg/(m^2).  GENERAL: healthy, alert and no distress, tight cough occasional  EYES: Eyes grossly normal to inspection, PERRL " and conjunctivae and sclerae normal  HENT: ear canals and TM's normal, nose and mouth without ulcers or lesions  NECK: no adenopathy, no asymmetry, masses, or scars and thyroid normal to palpation  RESP: lungs ; end expiratory bibasilar high pitched wheeze  CV: regular rate and rhythm, normal S1 S2, no S3 or S4, no murmur, click or rub, no peripheral edema  MS: no gross musculoskeletal defects noted,minimal swelling of the left 2nd DIP with tenderness  SKIN:DIP left 2nd toe deep blanching erythema    PSYCH: mentation appears normal, affect normal/bright    Diagnostic Test Results:      ASSESSMENT/PLAN:       ICD-10-CM    1. Acute gouty arthritis M10.9 Uric acid     Basic metabolic panel  (Ca, Cl, CO2, Creat, Gluc, K, Na, BUN)     indomethacin (INDOCIN) 25 MG capsule     WBC with Diff     CANCELED: XR Toe Right G/E 2 Views   2. Moderate persistent asthma with exacerbation J45.41 montelukast (SINGULAIR) 10 MG tablet     methylPREDNISolone (MEDROL DOSEPAK) 4 MG tablet   giving low dose short term pred and cautioned to take with food due to gastric irritation with concomitant use of indocin for probable gout  Will start singulair and he will go back on antihistamine as allergies do trigger his asthma  If his symptoms of gout recur frequently he willdiscuss use of allopurinol with his PCP  He is advised that ETOH triggers gout flares           LUCY [facility]

## 2018-08-30 NOTE — MR AVS SNAPSHOT
After Visit Summary   8/30/2018    Romelia Crystal    MRN: [id number]           Patient Information     Date Of Birth          1965        Visit Information        Provider Department      8/30/2018 4:00 PM Nikky Samuels APRN CNP [facility]        Today's Diagnoses     Acute gouty arthritis    -  1    Moderate persistent asthma with exacerbation           Follow-ups after your visit        Future tests that were ordered for you today     Open Future Orders        Priority Expected Expires Ordered    XR Toe Right G/E 2 Views Routine 8/30/2018 8/30/2019 8/30/2018            Who to contact     If you have questions or need follow up information about today's clinic visit or your schedule please contact [facility] directly at [phone].  Normal or non-critical lab and imaging results will be communicated to you by twidoxhart, letter or phone within 4 business days after the clinic has received the results. If you do not hear from us within 7 days, please contact the clinic through twidoxhart or phone. If you have a critical or abnormal lab result, we will notify you by phone as soon as possible.  Submit refill requests through [employer] or call your pharmacy and they will forward the refill request to us. Please allow 3 business days for your refill to be completed.          Additional Information About Your Visit        MyChart Information     [employer] gives you secure access to your electronic health record. If you see a primary care provider, you can also send messages to your care team and make appointments. If you have questions, please call your primary care clinic.  If you do not have a primary care provider, please call [phone] and they will assist you.        Care EveryWhere ID     This is your Care EveryWhere ID. This could be used by other organizations to access your [place] medical records  [id number]        Your Vitals Were     Pulse Height Pulse Oximetry BMI  "(Body Mass Index)          82 5' 10\" (1.778 m) 98% 35.3 kg/m2         Blood Pressure from Last 3 Encounters:   08/30/18 126/84   08/11/18 (!) 148/91   06/15/18 124/83    Weight from Last 3 Encounters:   08/30/18 246 lb (111.6 kg)   08/11/18 237 lb (107.5 kg)   06/15/18 249 lb 3.2 oz (113 kg)              We Performed the Following     Basic metabolic panel  (Ca, Cl, CO2, Creat, Gluc, K, Na, BUN)     Uric acid          Today's Medication Changes          These changes are accurate as of 8/30/18  4:04 PM.  If you have any questions, ask your nurse or doctor.               Start taking these medicines.        Dose/Directions    indomethacin 25 MG capsule   Commonly known as:  INDOCIN   Used for:  Acute gouty arthritis   Started by:  Nikky Samuels APRN CNP        Dose:  25 mg   Take 1 capsule (25 mg) by mouth 2 times daily (with meals) Decrease the dose to one pill daily as pain resolves and then every other day and then stop   Quantity:  42 capsule   Refills:  1       methylPREDNISolone 4 MG tablet   Commonly known as:  MEDROL DOSEPAK   Used for:  Moderate persistent asthma with exacerbation   Started by:  Nikky Samuels APRN CNP        Follow package instructions   Quantity:  21 tablet   Refills:  0       montelukast 10 MG tablet   Commonly known as:  SINGULAIR   Used for:  Moderate persistent asthma with exacerbation   Started by:  Nikky Samuels APRN CNP        Dose:  10 mg   Take 1 tablet (10 mg) by mouth At Bedtime   Quantity:  30 tablet   Refills:  1         These medicines have changed or have updated prescriptions.        Dose/Directions    loratadine 10 MG tablet   Commonly known as:  CLARITIN   This may have changed:    - when to take this  - reasons to take this   Used for:  Allergic rhinitis, mild        Dose:  10 mg   Take 1 tablet (10 mg) by mouth daily   Quantity:  30 tablet   Refills:  0            Where to get your medicines      These medications were sent to [facility] " #[id number] - [place], [place] - [address]  [address], [facility] [address]     Phone:  [phone]     methylPREDNISolone 4 MG tablet    montelukast 10 MG tablet         Some of these will need a paper prescription and others can be bought over the counter.  Ask your nurse if you have questions.     Bring a paper prescription for each of these medications     indomethacin 25 MG capsule                Primary Care Provider Office Phone # Fax #    Sandra Guardado PA-C [phone] [address] [facility] [address]  [facility] [address]        Equal Access to Services     [facility] : Hadii aad ku hadasho Soomaali, waaxda luqadaha, qaybta kaalmada adeegyada, herber thompson . So [facility] [phone].    ATENCIÓN: Si habla español, tiene a salgado disposición servicios gratuitos de asistencia lingüística. [facility] [phone].    We comply with applicable federal civil rights laws and [place] laws. We do not discriminate on the basis of race, color, national origin, age, disability, sex, sexual orientation, or gender identity.            Thank you!     Thank you for choosing [facility]  for your care. Our goal is always to provide you with excellent care. Hearing back from our patients is one way we can continue to improve our services. Please take a few minutes to complete the written survey that you may receive in the mail after your visit with us. Thank you!             Your Updated Medication List - Protect others around you: Learn how to safely use, store and throw away your medicines at www.disposemymeds.org.          This list is accurate as of 8/30/18  4:04 PM.  Always use your most recent med list.                   Brand Name Dispense Instructions for use Diagnosis    * albuterol 108 (90 Base) MCG/ACT inhaler    PROAIR HFA/PROVENTIL HFA/VENTOLIN HFA    3 Inhaler    Inhale 2 puffs into the lungs every 6 hours as needed    Mild persistent asthma, uncomplicated        * albuterol (2.5 MG/3ML) 0.083% neb solution     1 Box    Take 2 vials (5 mg) by nebulization every 4 hours as needed for shortness of breath / dyspnea or wheezing        * albuterol (2.5 MG/3ML) 0.083% neb solution     360 mL    Take 1 vial (2.5 mg) by nebulization every 6 hours as needed for shortness of breath / dyspnea or wheezing    Acute bronchospasm       amLODIPine 5 MG tablet    NORVASC    90 tablet    Take 1 tablet (5 mg) by mouth daily    Essential hypertension, benign       buPROPion 300 MG 24 hr tablet    WELLBUTRIN XL    90 tablet    Take 1 tablet (300 mg) by mouth every morning    Mild recurrent major depression (H)       fluticasone-salmeterol 250-50 MCG/DOSE diskus inhaler    ADVAIR    3 Inhaler    Inhale 1 puff into the lungs 2 times daily    Mild persistent asthma, uncomplicated       indomethacin 25 MG capsule    INDOCIN    42 capsule    Take 1 capsule (25 mg) by mouth 2 times daily (with meals) Decrease the dose to one pill daily as pain resolves and then every other day and then stop    Acute gouty arthritis       loratadine 10 MG tablet    CLARITIN    30 tablet    Take 1 tablet (10 mg) by mouth daily    Allergic rhinitis, mild       methylPREDNISolone 4 MG tablet    MEDROL DOSEPAK    21 tablet    Follow package instructions    Moderate persistent asthma with exacerbation       montelukast 10 MG tablet    SINGULAIR    30 tablet    Take 1 tablet (10 mg) by mouth At Bedtime    Moderate persistent asthma with exacerbation       * rosuvastatin 5 MG tablet    CRESTOR    90 tablet    TAKE ONE TABLET BY MOUTH ONE TIME DAILY    Hyperlipidemia with target LDL less than 130       * rosuvastatin 5 MG tablet    CRESTOR    90 tablet    TAKE ONE TABLET BY MOUTH ONE TIME DAILY    Hyperlipidemia with target LDL less than 130       venlafaxine 75 MG 24 hr capsule    EFFEXOR-XR     Take 150 mg by mouth daily        * Notice:  This list has 5 medication(s) that are the same as other medications prescribed for  you. Read the directions carefully, and ask your doctor or other care provider to review them with you.

## 2018-08-30 NOTE — LETTER
[place] [address] [facility]  [address]  [place]  [address]  Tel: [phone]    August 31, 2018    Romelia Crystal  [address] [place] [address]        Dear Mr. Elevy,    The uric acid is in normal range but as we discussed that does not mean that this is not gout.    I do want to remind you that if you have stomach problems on the short burst of prednisone while you are also taking the indocin stop the indocin until the prednisone is complete and then resume the indocin until the pain is gone.  Resulted Orders   Uric acid   Result Value Ref Range    Uric Acid 5.7 3.5 - 7.2 mg/dL   Basic metabolic panel  (Ca, Cl, CO2, Creat, Gluc, K, Na, BUN)   Result Value Ref Range    Sodium 140 133 - 144 mmol/L    Potassium 4.7 3.4 - 5.3 mmol/L    Chloride 106 94 - 109 mmol/L    Carbon Dioxide 26 20 - 32 mmol/L    Anion Gap 8 3 - 14 mmol/L    Glucose 95 70 - 99 mg/dL    Urea Nitrogen 15 7 - 30 mg/dL    Creatinine 0.92 0.66 - 1.25 mg/dL    GFR Estimate 86 >60 mL/min/1.7m2      Comment:      Non  GFR Calc    GFR Estimate If Black >90 >60 mL/min/1.7m2      Comment:       GFR Calc    Calcium 8.9 8.5 - 10.1 mg/dL   WBC with Diff   Result Value Ref Range    WBC 6.7 4.0 - 11.0 10e9/L    Diff Method Automated Method     % Neutrophils 57.0 %    % Lymphocytes 28.3 %    % Monocytes 9.3 %    % Eosinophils 4.8 %    % Basophils 0.6 %    Absolute Neutrophil 3.8 1.6 - 8.3 10e9/L    Absolute Lymphocytes 1.9 0.8 - 5.3 10e9/L    Absolute Monocytes 0.6 0.0 - 1.3 10e9/L    Absolute Eosinophils 0.3 0.0 - 0.7 10e9/L    Absolute Basophils 0.0 0.0 - 0.2 10e9/L       If you have any further questions or problems, please contact our office.      Sincerely,    Nikky Samuels, ESVIN / perla

## 2018-08-31 ENCOUNTER — TELEPHONE (OUTPATIENT)
Dept: FAMILY MEDICINE | Facility: CLINIC | Age: 53
End: 2018-08-31

## 2018-08-31 LAB
ANION GAP SERPL CALCULATED.3IONS-SCNC: 8 MMOL/L (ref 3–14)
BUN SERPL-MCNC: 15 MG/DL (ref 7–30)
CALCIUM SERPL-MCNC: 8.9 MG/DL (ref 8.5–10.1)
CHLORIDE SERPL-SCNC: 106 MMOL/L (ref 94–109)
CO2 SERPL-SCNC: 26 MMOL/L (ref 20–32)
CREAT SERPL-MCNC: 0.92 MG/DL (ref 0.66–1.25)
GFR SERPL CREATININE-BSD FRML MDRD: 86 ML/MIN/1.7M2
GLUCOSE SERPL-MCNC: 95 MG/DL (ref 70–99)
POTASSIUM SERPL-SCNC: 4.7 MMOL/L (ref 3.4–5.3)
SODIUM SERPL-SCNC: 140 MMOL/L (ref 133–144)
URATE SERPL-MCNC: 5.7 MG/DL (ref 3.5–7.2)

## 2018-08-31 NOTE — TELEPHONE ENCOUNTER
Called pharmacy with information and advise from Nikky PEPPER      Pharmacist stated understanding and will share the info and advise with patient.     Bell HAMILTON RN,BSN

## 2018-08-31 NOTE — PROGRESS NOTES
The uric acid is in normal range but as we discussed that does not mean that this is not gout.    I do want to remind you that if you have stomach problems on the short burst of prednisone while you are also taking the indocin stop the indocin until the prednisone is complete and then resume the indocin until the pain is gone.

## 2018-08-31 NOTE — TELEPHONE ENCOUNTER
Call is from pharmacy --cautioning patient about the stomach upset possibility while taking both meds together.  Wanting to confirm if OK to take these 2 meds simultaneously.   Please review and advise.    Triage will follow up with pharmacy and patient.        Yesterday's visit with Nikky WILSON---  Uric Acid lab still in process.   Toe Pain        2 days ago patient woke up with redness, warmth, and a lot of pain in L foot 2nd digit, h/o, no family hx gout   Traveling to [place] in a week      1. Acute gouty arthritis M10.9 Uric acid       Basic metabolic panel  (Ca, Cl, CO2, Creat, Gluc, K, Na, BUN)       indomethacin (INDOCIN) 25 MG capsule       WBC with Diff                    giving low dose short term pred and cautioned to take withfood due to gastric irritation with concomitant use of indocin for probable gout  If his symptoms of gout recur frequently he willdiscuss use of allopurinol with his PCP  He is advised that ETOH triggers gout flares

## 2018-08-31 NOTE — TELEPHONE ENCOUNTER
It was intentional ; a very low dose of weaning prednisone for his asthma/wheezing and indocin for gout.  Pt was advised that it might cause GI upset and told to stop the indocin in that case until medrol completed

## 2018-08-31 NOTE — TELEPHONE ENCOUNTER
Triage ,  Please speak to patient   Find out what his concerns are  he was seen by Nikky on 8/30  Dr.Nasima Selene MD

## 2018-08-31 NOTE — TELEPHONE ENCOUNTER
Reason for Call:  Medication clarification    Do you use a [facility]?  Name of the pharmacy and phone number for the current request:         [id number] - [place] [address]    Name of the medication requested:     1.  indomethacin (INDOCIN) 25 MG capsule    2.  methylPREDNISolone (MEDROL DOSEPAK) 4 MG tablet    Other request: wondering if patient is supposed to take both of these medications at the same time?  Taking both of these medications can cause very upset stomach.    Patient was not sure what he is supposed to take.    Please call pharmacy to clarify.    [phone]    Call taken on 8/31/2018 at 8:59 AM by Mary Monaco  .

## 2018-09-07 ENCOUNTER — DOCUMENTATION ONLY (OUTPATIENT)
Dept: SLEEP MEDICINE | Facility: CLINIC | Age: 53
End: 2018-09-07

## 2018-09-07 DIAGNOSIS — I10 ESSENTIAL HYPERTENSION, BENIGN: ICD-10-CM

## 2018-09-07 DIAGNOSIS — G47.33 OSA (OBSTRUCTIVE SLEEP APNEA): ICD-10-CM

## 2018-09-07 RX ORDER — AMLODIPINE BESYLATE 5 MG/1
5 TABLET ORAL DAILY
Qty: 90 TABLET | Refills: 2 | Status: SHIPPED | OUTPATIENT
Start: 2018-09-07 | End: 2019-07-16

## 2018-09-07 NOTE — PROGRESS NOTES
Patient is no longer using therapy.  Data is no longer transmitting any usage or usage is less than 20 minutes during the last 30 days.    Patient still has machine: yes      Voicemail left requesting contact from patient.

## 2018-09-26 ENCOUNTER — DOCUMENTATION ONLY (OUTPATIENT)
Dept: SLEEP MEDICINE | Facility: CLINIC | Age: 53
End: 2018-09-26
Payer: COMMERCIAL

## 2018-09-26 DIAGNOSIS — G47.33 OSA (OBSTRUCTIVE SLEEP APNEA): ICD-10-CM

## 2018-09-26 NOTE — PROGRESS NOTES
6 month [facility] Recheck Visit     Diagnostic AHI: 76.1  PSG    Message left for patient to return call.     Assessment: Pt not meeting objective benchmarks for compliance.   Action plan: Waiting for patient to return call.   Pt to follow up per provider request (1-2 yrs).       Device type: Auto-CPAP  PAP settings: CPAP min 8.0 cm  H20     CPAP max 14.0 cm  H20  Objective measures: 14 day rolling measures     Objective measure goal  Compliance   Goal >70%  Leak   Goal < 24 lpm  AHI  Goal < 5  Usage  Goal >240

## 2018-10-02 ENCOUNTER — MYC MEDICAL ADVICE (OUTPATIENT)
Dept: OTHER | Age: 53
End: 2018-10-02

## 2018-11-07 DIAGNOSIS — E78.5 HYPERLIPIDEMIA WITH TARGET LDL LESS THAN 130: ICD-10-CM

## 2018-11-07 NOTE — TELEPHONE ENCOUNTER
"  rosuvastatin (CRESTOR) 5 MG tablet 90 tablet 0 7/17/2018       Last Written Prescription Date:  07/17/2018  Last Fill Quantity: 90,  # refills: 0   Last office visit: 8/30/2018 with prescribing provider:  Arvind   Future Office Visit: 11/27/2018  Next 5 appointments (look out 90 days)     Nov 27, 2018  9:00 AM CST   Pre-Op physical with Sandra Guardado PA-C   [facility]    [address] [id number] [phone]                 Requested Prescriptions   Pending Prescriptions Disp Refills     rosuvastatin (CRESTOR) 5 MG tablet [Pharmacy Med Name: Rosuvastatin Calcium Oral Tablet 5 MG] 90 tablet 0     Sig: TAKE ONE TABLET BY MOUTH ONE TIME DAILY    Statins Protocol Passed    11/7/2018 12:22 PM       Passed - LDL on file in past 12 months    Recent Labs   Lab Test  03/29/18   1032   LDL  164*            Passed - No abnormal creatine kinase in past 12 months    Recent Labs   Lab Test  07/26/17   0808   CKT  68               Passed - Recent (12 mo) or future (30 days) visit within the authorizing provider's specialty    Patient had office visit in the last 12 months or has a visit in the next 30 days with authorizing provider or within the authorizing provider's specialty.  See \"Patient Info\" tab in inbasket, or \"Choose Columns\" in Meds & Orders section of the refill encounter.             Passed - Patient is age 18 or older          "

## 2018-11-08 RX ORDER — ROSUVASTATIN CALCIUM 5 MG/1
TABLET, COATED ORAL
Qty: 90 TABLET | Refills: 0 | Status: SHIPPED | OUTPATIENT
Start: 2018-11-08 | End: 2018-11-27

## 2018-11-27 ENCOUNTER — OFFICE VISIT (OUTPATIENT)
Dept: FAMILY MEDICINE | Facility: CLINIC | Age: 53
End: 2018-11-27
Payer: COMMERCIAL

## 2018-11-27 VITALS
WEIGHT: 238 LBS | HEART RATE: 92 BPM | HEIGHT: 70 IN | DIASTOLIC BLOOD PRESSURE: 87 MMHG | BODY MASS INDEX: 34.07 KG/M2 | TEMPERATURE: 98.9 F | SYSTOLIC BLOOD PRESSURE: 130 MMHG | OXYGEN SATURATION: 96 %

## 2018-11-27 DIAGNOSIS — Z01.818 PREOP GENERAL PHYSICAL EXAM: Primary | ICD-10-CM

## 2018-11-27 DIAGNOSIS — I10 ESSENTIAL HYPERTENSION, BENIGN: ICD-10-CM

## 2018-11-27 DIAGNOSIS — Z11.4 SCREENING FOR HUMAN IMMUNODEFICIENCY VIRUS: ICD-10-CM

## 2018-11-27 DIAGNOSIS — J45.40 MODERATE PERSISTENT ASTHMA, UNCOMPLICATED: ICD-10-CM

## 2018-11-27 DIAGNOSIS — J33.9 NASAL POLYP: ICD-10-CM

## 2018-11-27 DIAGNOSIS — E78.5 HYPERLIPIDEMIA WITH TARGET LDL LESS THAN 130: ICD-10-CM

## 2018-11-27 DIAGNOSIS — R97.20 ELEVATED PROSTATE SPECIFIC ANTIGEN (PSA): ICD-10-CM

## 2018-11-27 LAB
HGB BLD-MCNC: 15.1 G/DL (ref 13.3–17.7)
HIV 1+2 AB+HIV1 P24 AG SERPL QL IA: NONREACTIVE
PSA SERPL-ACNC: 4.01 UG/L (ref 0–4)

## 2018-11-27 PROCEDURE — 87389 HIV-1 AG W/HIV-1&-2 AB AG IA: CPT | Performed by: PHYSICIAN ASSISTANT

## 2018-11-27 PROCEDURE — G0103 PSA SCREENING: HCPCS | Performed by: PHYSICIAN ASSISTANT

## 2018-11-27 PROCEDURE — 99214 OFFICE O/P EST MOD 30 MIN: CPT | Performed by: PHYSICIAN ASSISTANT

## 2018-11-27 PROCEDURE — 85018 HEMOGLOBIN: CPT | Performed by: PHYSICIAN ASSISTANT

## 2018-11-27 PROCEDURE — 36415 COLL VENOUS BLD VENIPUNCTURE: CPT | Performed by: PHYSICIAN ASSISTANT

## 2018-11-27 PROCEDURE — 93000 ELECTROCARDIOGRAM COMPLETE: CPT | Performed by: PHYSICIAN ASSISTANT

## 2018-11-27 RX ORDER — ROSUVASTATIN CALCIUM 10 MG/1
10 TABLET, COATED ORAL DAILY
Qty: 90 TABLET | Refills: 1 | Status: SHIPPED | OUTPATIENT
Start: 2018-11-27 | End: 2019-09-03

## 2018-11-27 ASSESSMENT — PATIENT HEALTH QUESTIONNAIRE - PHQ9: SUM OF ALL RESPONSES TO PHQ QUESTIONS 1-9: 1

## 2018-11-27 NOTE — MR AVS SNAPSHOT
After Visit Summary   11/27/2018    Romelia [place]    MRN: [id number]           Patient Information     Date Of Birth          1965        Visit Information        Provider Department      11/27/2018 9:00 AM Sandra Guardado PA-C [facility]        Today's Diagnoses     Preop general physical exam    -  1    Nasal polyp        Essential hypertension, benign        Moderate persistent asthma, uncomplicated        Screening for human immunodeficiency virus        Hyperlipidemia with target LDL less than 130        Elevated prostate specific antigen (PSA)          Care Instructions      Before Your Surgery      Call your surgeon if there is any change in your health. This includes signs of a cold or flu (such as a sore throat, runny nose, cough, rash or fever).    Do not smoke, drink alcohol or take over the counter medicine (unless your surgeon or primary care doctor tells you to) for the 24 hours before and after surgery.    If you take prescribed drugs: Follow your doctor s orders about which medicines to take and which to stop until after surgery.    Eating and drinking prior to surgery: follow the instructions from your surgeon    Take a shower or bath the night before surgery. Use the soap your surgeon gave you to gently clean your skin. If you do not have soap from your surgeon, use your regular soap. Do not shave or scrub the surgery site.  Wear clean pajamas and have clean sheets on your bed.           Follow-ups after your visit        Who to contact     If you have questions or need follow up information about today's clinic visit or your schedule please contact [facility] directly at [phone].  Normal or non-critical lab and imaging results will be communicated to you by MyChart, letter or phone within 4 business days after the clinic has received the results. If you do not hear from us within 7 days, please contact the clinic through MyChart or phone. If you  "have a critical or abnormal lab result, we will notify you by phone as soon as possible.  Submit refill requests through [employer] or call your pharmacy and they will forward the refill request to us. Please allow 3 business days for your refill to be completed.          Additional Information About Your Visit        [employer] Information     [employer] gives you secure access to your electronic health record. If you see a primary care provider, you can also send messages to your care team and make appointments. If you have questions, please call your primary care clinic.  If you do not have a primary care provider, please call [phone] and they will assist you.        Care EveryWhere ID     This is your Care EveryWhere ID. This could be used by other organizations to access your [facility] medical records  [id number]        Your Vitals Were     Pulse Temperature Height Pulse Oximetry BMI (Body Mass Index)       92 98.9  F (37.2  C) (Oral) 5' 10\" (1.778 m) 96% 34.15 kg/m2        Blood Pressure from Last 3 Encounters:   11/27/18 130/87   08/30/18 126/84   08/11/18 (!) 148/91    Weight from Last 3 Encounters:   11/27/18 238 lb (108 kg)   08/30/18 246 lb (111.6 kg)   08/11/18 237 lb (107.5 kg)              We Performed the Following     EKG 12-lead complete w/read - Clinics     Hemoglobin     HIV Antigen Antibody Combo     Prostate spec antigen screen          Today's Medication Changes          These changes are accurate as of 11/27/18  9:24 AM.  If you have any questions, ask your nurse or doctor.               These medicines have changed or have updated prescriptions.        Dose/Directions    * albuterol 108 (90 Base) MCG/ACT inhaler   Commonly known as:  PROAIR HFA/PROVENTIL HFA/VENTOLIN HFA   This may have changed:  Another medication with the same name was removed. Continue taking this medication, and follow the directions you see here.   Used for:  Mild persistent asthma, uncomplicated   Changed by:  Sandra Guardado " JERAD Man        Dose:  2 puff   Inhale 2 puffs into the lungs every 6 hours as needed   Quantity:  3 Inhaler   Refills:  0       * albuterol (2.5 MG/3ML) 0.083% neb solution   Commonly known as:  PROVENTIL   This may have changed:  Another medication with the same name was removed. Continue taking this medication, and follow the directions you see here.   Changed by:  Sandra Guardado PA-C        Dose:  5 mg   Take 2 vials (5 mg) by nebulization every 4 hours as needed for shortness of breath / dyspnea or wheezing   Quantity:  1 Box   Refills:  0       rosuvastatin 10 MG tablet   Commonly known as:  CRESTOR   This may have changed:    - medication strength  - See the new instructions.   Used for:  Hyperlipidemia with target LDL less than 130   Changed by:  Sandra Guardado PA-C        Dose:  10 mg   Take 1 tablet (10 mg) by mouth daily   Quantity:  90 tablet   Refills:  1       * Notice:  This list has 2 medication(s) that are the same as other medications prescribed for you. Read the directions carefully, and ask your doctor or other care provider to review them with you.      Stop taking these medicines if you haven't already. Please contact your care team if you have questions.     indomethacin 25 MG capsule   Commonly known as:  INDOCIN   Stopped by:  Sandra Guardado PA-C           loratadine 10 MG tablet   Commonly known as:  CLARITIN   Stopped by:  Sandra Guardado PA-C                Where to get your medicines      These medications were sent to [facility] #[id number] - [place] [address]  [address] [address]     Phone:  [phone]     rosuvastatin 10 MG tablet                Primary Care Provider Office Phone # Fax #    Sandra Guardado PA-C [phone] [address] [facility] [address]  [facility] [address]        Equal Access to Services     SANDEEP GRANADOS AH: Amanda santiago Sorobert, waaxda luqadaha, qaybta kaalherber ojeda  cecilia mounarobin laLatoyaaan ah. So [facility] [phone].    ATENCIÓN: Si debbie jain, tiene a salgado disposición servicios gratuitos de asistencia lingüística. Sita al [phone].    We comply with applicable federal civil rights laws and [place] laws. We do not discriminate on the basis of race, color, national origin, age, disability, sex, sexual orientation, or gender identity.            Thank you!     Thank you for choosing [facility]  for your care. Our goal is always to provide you with excellent care. Hearing back from our patients is one way we can continue to improve our services. Please take a few minutes to complete the written survey that you may receive in the mail after your visit with us. Thank you!             Your Updated Medication List - Protect others around you: Learn how to safely use, store and throw away your medicines at www.disposemymeds.org.          This list is accurate as of 11/27/18  9:24 AM.  Always use your most recent med list.                   Brand Name Dispense Instructions for use Diagnosis    * albuterol 108 (90 Base) MCG/ACT inhaler    PROAIR HFA/PROVENTIL HFA/VENTOLIN HFA    3 Inhaler    Inhale 2 puffs into the lungs every 6 hours as needed    Mild persistent asthma, uncomplicated       * albuterol (2.5 MG/3ML) 0.083% neb solution    PROVENTIL    1 Box    Take 2 vials (5 mg) by nebulization every 4 hours as needed for shortness of breath / dyspnea or wheezing        amLODIPine 5 MG tablet    NORVASC    90 tablet    Take 1 tablet (5 mg) by mouth daily    Essential hypertension, benign       BREO ELLIPTA 200-25 MCG/INH inhaler   Generic drug:  fluticasone-vilanterol      Inhale 1 puff into the lungs daily        buPROPion 300 MG 24 hr tablet    WELLBUTRIN XL    90 tablet    Take 1 tablet (300 mg) by mouth every morning    Mild recurrent major depression (H)       montelukast 10 MG tablet    SINGULAIR    30 tablet    Take 1 tablet (10 mg) by mouth At Bedtime    Moderate  persistent asthma with exacerbation       rosuvastatin 10 MG tablet    CRESTOR    90 tablet    Take 1 tablet (10 mg) by mouth daily    Hyperlipidemia with target LDL less than 130       venlafaxine 75 MG 24 hr capsule    EFFEXOR-XR     Take 150 mg by mouth daily        * Notice:  This list has 2 medication(s) that are the same as other medications prescribed for you. Read the directions carefully, and ask your doctor or other care provider to review them with you.

## 2018-11-27 NOTE — PROGRESS NOTES
[place] [address] [facility] [address]  [phone]  Dept: [phone]    PRE-OP EVALUATION:  Today's date: 2018    Romelia Crystal (: 1965) presents for pre-operative evaluation assessment as requested by Dr. Ortiz.  He requires evaluation and anesthesia risk assessment prior to undergoing surgery/procedure for treatment of Nasal Polyps .    Fax number for surgical facility: [phone]  Primary Physician: Sandra Guardado PA-C  Type of Anesthesia Anticipated: to be determined    Patient has a Health Care Directive or Living Will:  YES    Preop Questions 2018   Who is doing your surgery? Miguel Ortiz   What are you having done? Nasal polyps removed   Date of Surgery/Procedure: 18   Facility or Hospital where procedure/surgery will be performed: [facility]   1.  Do you have a history of Heart attack, stroke, stent, coronary bypass surgery, or other heart surgery? No   2.  Do you ever have any pain or discomfort in your chest? No   3.  Do you have a history of  Heart Failure? No   4.   Are you troubled by shortness of breath when:  walking on a level surface, or up a slight hill, or at night? No   5.  Do you currently have a cold, bronchitis or other respiratory infection? No   6.  Do you have a cough, shortness of breath, or wheezing? No   7.  Do you sometimes get pains in the calves of your legs when you walk? No   8. Do you or anyone in your family have previous history of blood clots? No   9.  Do you or does anyone in your family have a serious bleeding problem such as prolonged bleeding following surgeries or cuts? No   10. Have you ever had problems with anemia or been told to take iron pills? No   11. Have you had any abnormal blood loss such as black, tarry or bloody stools? No   12. Have you ever had a blood transfusion? No   13. Have you or any of your relatives ever had problems with anesthesia? No   14. Do you have sleep apnea, excessive  snoring or daytime drowsiness? YES - ADALGISA uses CPAP   15. Do you have any prosthetic heart valves? No   16. Do you have prosthetic joints? No         HPI:     HPI related to upcoming procedure: nasal obstruction and cannot use CPAP due to polyps      DEPRESSION - Patient has a long history of Depression of moderate severity requiring medication for control with recent symptoms being stable. Current symptoms of depression include none.                                                                                                                                                                                    .  HYPERTENSION - Patient has longstanding history of HTN , currently denies any symptoms referable to elevated blood pressure. Specifically denies chest pain, palpitations, dyspnea, orthopnea, PND or peripheral edema. Blood pressure readings have been in normal range. Current medication regimen is as listed below. Patient denies any side effects of medication.                                                                                                                                                                                          .    MEDICAL HISTORY:     Patient Active Problem List    Diagnosis Date Noted     Elevated prostate specific antigen (PSA) 03/29/2018     Priority: Medium     Morbid obesity (H) 03/29/2018     Priority: Medium     ADALGISA (obstructive sleep apnea) 03/09/2018     Priority: Medium     3/8/2018 [facility] Sleep Study (247.0 lbs) - AHI 76.1, RDI 81.5, Supine AHI -, REM AHI -, Low O2% 85.6%, Time Spent ?88% 1.7, Time Spent ?89% 4.7. Treatment was titrated to a pressure of CPAP 10 with an AHI 17.2. Time spent in REM supine at this pressure was - minutes.       Diverticulitis 12/20/2017     Priority: Medium     Moderate persistent asthma, uncomplicated 04/11/2016     Priority: Medium     Decreased libido 04/11/2016     Priority: Medium     Essential hypertension, benign  08/05/2014     Priority: Medium     Eczema      Priority: Medium     Hives 01/29/2013     Priority: Medium     BPPV (benign paroxysmal positional vertigo) 01/29/2013     Priority: Medium     Mild recurrent major depression (H) 06/12/2012     Priority: Medium     Hyperlipidemia with target LDL less than 130      Priority: Medium     Diagnosis updated by automated process. Provider to review and confirm.       Allergic rhinitis, mild      Priority: Medium     with cough        Past Medical History:   Diagnosis Date     Allergic rhinitis, mild     with cough     Eczema      HTN (hypertension), benign      Hyperlipidemia LDL goal < 130      Mild persistent asthma      Mild persistent asthma      Mild recurrent major depression (H) 6/12/2012    Followed by Rivera Din[facility] at [facility]     Past Surgical History:   Procedure Laterality Date     TONSILLECTOMY       wisdom teeth       Current Outpatient Prescriptions   Medication Sig Dispense Refill     albuterol (2.5 MG/3ML) 0.083% neb solution Take 2 vials (5 mg) by nebulization every 4 hours as needed for shortness of breath / dyspnea or wheezing 1 Box 0     albuterol (PROAIR HFA/PROVENTIL HFA/VENTOLIN HFA) 108 (90 Base) MCG/ACT Inhaler Inhale 2 puffs into the lungs every 6 hours as needed 3 Inhaler 0     amLODIPine (NORVASC) 5 MG tablet Take 1 tablet (5 mg) by mouth daily 90 tablet 2     BREO ELLIPTA 200-25 MCG/INH Inhaler Inhale 1 puff into the lungs daily       buPROPion (WELLBUTRIN XL) 300 MG 24 hr tablet Take 1 tablet (300 mg) by mouth every morning 90 tablet 1     montelukast (SINGULAIR) 10 MG tablet Take 1 tablet (10 mg) by mouth At Bedtime 30 tablet 1     rosuvastatin (CRESTOR) 10 MG tablet Take 1 tablet (10 mg) by mouth daily 90 tablet 1     venlafaxine (EFFEXOR-XR) 75 MG 24 hr capsule Take 150 mg by mouth daily        [DISCONTINUED] albuterol (2.5 MG/3ML) 0.083% neb solution Take 1 vial (2.5 mg) by nebulization every 6 hours as needed for shortness of breath / dyspnea  "or wheezing 360 mL 0     [DISCONTINUED] rosuvastatin (CRESTOR) 5 MG tablet TAKE ONE TABLET BY MOUTH ONE TIME DAILY  90 tablet 0     [DISCONTINUED] rosuvastatin (CRESTOR) 5 MG tablet TAKE ONE TABLET BY MOUTH ONE TIME DAILY  90 tablet 0     OTC products: None, except as noted above    Allergies   Allergen Reactions     Hmg-Coa-R Inhibitors Other (See Comments)     Some Statins Muscle aches      Lisinopril Cough      Latex Allergy: NO    Social History   Substance Use Topics     Smoking status: Never Smoker     Smokeless tobacco: Never Used     Alcohol use 0.0 oz/week     0 Standard drinks or equivalent per week      Comment: 2-3 beers on wkend     History   Drug Use No       REVIEW OF SYSTEMS:   Constitutional, neuro, ENT, endocrine, pulmonary, cardiac, gastrointestinal, genitourinary, musculoskeletal, integument and psychiatric systems are negative, except as otherwise noted.    EXAM:   /87 (BP Location: Right arm, Cuff Size: Adult Large)  Pulse 92  Temp 98.9  F (37.2  C) (Oral)  Ht 5' 10\" (1.778 m)  Wt 238 lb (108 kg)  SpO2 96%  BMI 34.15 kg/m2    GENERAL APPEARANCE: healthy, alert and no distress     EYES: EOMI,  PERRL     HENT: ear canals and TM's normal and nose and mouth without ulcers or lesions     NECK: no adenopathy, no asymmetry, masses, or scars and thyroid normal to palpation     RESP: lungs clear to auscultation - no rales, rhonchi or wheezes     CV: regular rates and rhythm, normal S1 S2, no S3 or S4 and no murmur, click or rub     ABDOMEN:  soft, nontender, no HSM or masses and bowel sounds normal     MS: extremities normal- no gross deformities noted, no evidence of inflammation in joints, FROM in all extremities.     SKIN: no suspicious lesions or rashes     NEURO: Normal strength and tone, sensory exam grossly normal, mentation intact and speech normal     PSYCH: mentation appears normal. and affect normal/bright     LYMPHATICS: No cervical adenopathy    DIAGNOSTICS:     Results for " orders placed or performed in visit on 11/27/18   Hemoglobin   Result Value Ref Range    Hemoglobin 15.1 13.3 - 17.7 g/dL     EKG: NSR    IMPRESSION:   Reason for surgery/procedure: nasal polyps  Diagnosis/reason for consult: nasal obstruction    The proposed surgical procedure is considered LOW risk.        ICD-10-CM    1. Preop general physical exam Z01.818 Hemoglobin     EKG 12-lead complete w/read - Clinics   2. Nasal polyp J33.9    3. Essential hypertension, benign I10    4. Moderate persistent asthma, uncomplicated J45.40    5. Screening for human immunodeficiency virus Z11.4 HIV Antigen Antibody Combo   6. Hyperlipidemia with target LDL less than 130 E78.5 rosuvastatin (CRESTOR) 10 MG tablet   7. Elevated prostate specific antigen (PSA) R97.20 Prostate spec antigen screen       RECOMMENDATIONS:     APPROVAL GIVEN to proceed with proposed procedure, without further diagnostic evaluation. Pt does not take ASA or NSAIDs.  He is advised to take his Breo, Norvasc, Wellbutrin and Effexor the morning of surgery with a sip of water.       Signed Electronically by: Sandra Guardado PA-C    Copy of this evaluation report is provided to requesting physician.    [facility] Preop Guidelines    Revised Cardiac Risk Index

## 2018-11-28 ASSESSMENT — ASTHMA QUESTIONNAIRES: ACT_TOTALSCORE: 25

## 2018-11-28 NOTE — PROGRESS NOTES
[place],    Your PSA is coming down and now 4.01 so I would be okay with just rechecking this in 6 months or you could see the urologist as we discussed.    Your HIV screening test was negative.  Your hemoglobin looks good at 15.1.    Sandra Guardado PA-C

## 2019-07-14 DIAGNOSIS — I10 ESSENTIAL HYPERTENSION, BENIGN: ICD-10-CM

## 2019-07-16 RX ORDER — AMLODIPINE BESYLATE 5 MG/1
5 TABLET ORAL DAILY
Qty: 90 TABLET | Refills: 0 | Status: SHIPPED | OUTPATIENT
Start: 2019-07-16 | End: 2019-07-22

## 2019-07-16 RX ORDER — AMLODIPINE BESYLATE 5 MG/1
TABLET ORAL
Qty: 90 TABLET | Refills: 0 | OUTPATIENT
Start: 2019-07-16

## 2019-07-21 DIAGNOSIS — I10 ESSENTIAL HYPERTENSION, BENIGN: ICD-10-CM

## 2019-07-22 RX ORDER — AMLODIPINE BESYLATE 5 MG/1
TABLET ORAL
Qty: 90 TABLET | Refills: 0 | OUTPATIENT
Start: 2019-07-22

## 2019-07-22 NOTE — TELEPHONE ENCOUNTER
"Requested Prescriptions   Pending Prescriptions Disp Refills     amLODIPine (NORVASC) 5 MG tablet [Pharmacy Med Name: AMLODIPINE BESYLATE 5MG TABLETS] 90 tablet 0     Sig: TAKE 1 TABLET(5 MG) BY MOUTH DAILY   Last Written Prescription Date:  7/16/2019  Last Fill Quantity: 90,  # refills: 0   Last office visit: 11/27/2018 with prescribing provider:  Debby   Future Office Visit:        Calcium Channel Blockers Protocol  Passed - 7/21/2019  1:40 PM        Passed - Blood pressure under 140/90 in past 12 months     BP Readings from Last 3 Encounters:   11/27/18 130/87   08/30/18 126/84   08/11/18 (!) 148/91                 Passed - Recent (12 mo) or future (30 days) visit within the authorizing provider's specialty     Patient had office visit in the last 12 months or has a visit in the next 30 days with authorizing provider or within the authorizing provider's specialty.  See \"Patient Info\" tab in inbasket, or \"Choose Columns\" in Meds & Orders section of the refill encounter.              Passed - Medication is active on med list        Passed - Patient is age 18 or older        Passed - Normal serum creatinine on file in past 12 months     Recent Labs   Lab Test 08/30/18  1618   CR 0.92             Refill request too soon.    ADRIA AndresN, RN  [employer] [facility]      "

## 2019-09-02 ENCOUNTER — APPOINTMENT (OUTPATIENT)
Dept: CT IMAGING | Facility: CLINIC | Age: 54
End: 2019-09-02
Attending: EMERGENCY MEDICINE
Payer: COMMERCIAL

## 2019-09-02 ENCOUNTER — HOSPITAL ENCOUNTER (EMERGENCY)
Facility: CLINIC | Age: 54
Discharge: HOME | End: 2019-09-02
Attending: EMERGENCY MEDICINE | Admitting: EMERGENCY MEDICINE
Payer: COMMERCIAL

## 2019-09-02 VITALS
SYSTOLIC BLOOD PRESSURE: 122 MMHG | HEART RATE: 73 BPM | DIASTOLIC BLOOD PRESSURE: 84 MMHG | WEIGHT: 285 LBS | TEMPERATURE: 98 F | OXYGEN SATURATION: 94 % | RESPIRATION RATE: 15 BRPM | HEIGHT: 70 IN | BODY MASS INDEX: 40.8 KG/M2

## 2019-09-02 DIAGNOSIS — H65.92 OME (OTITIS MEDIA WITH EFFUSION), LEFT: ICD-10-CM

## 2019-09-02 DIAGNOSIS — R51.9 ACUTE NONINTRACTABLE HEADACHE, UNSPECIFIED HEADACHE TYPE: ICD-10-CM

## 2019-09-02 LAB
ALBUMIN UR-MCNC: 10 MG/DL
ANION GAP SERPL CALCULATED.3IONS-SCNC: 6 MMOL/L (ref 3–14)
APPEARANCE CSF: CLEAR
APPEARANCE UR: CLEAR
BASOPHILS # BLD AUTO: 0.1 10E9/L (ref 0–0.2)
BASOPHILS NFR BLD AUTO: 0.9 %
BILIRUB UR QL STRIP: NEGATIVE
BUN SERPL-MCNC: 10 MG/DL (ref 7–30)
CALCIUM SERPL-MCNC: 8.9 MG/DL (ref 8.5–10.1)
CHLORIDE SERPL-SCNC: 102 MMOL/L (ref 94–109)
CO2 BLDCOV-SCNC: 26 MMOL/L (ref 21–28)
CO2 SERPL-SCNC: 27 MMOL/L (ref 20–32)
COLOR CSF: COLORLESS
COLOR UR AUTO: YELLOW
CREAT SERPL-MCNC: 0.99 MG/DL (ref 0.66–1.25)
DIFFERENTIAL METHOD BLD: ABNORMAL
EOSINOPHIL # BLD AUTO: 0.1 10E9/L (ref 0–0.7)
EOSINOPHIL NFR BLD AUTO: 1.9 %
ERYTHROCYTE [DISTWIDTH] IN BLOOD BY AUTOMATED COUNT: 13.1 % (ref 10–15)
GFR SERPL CREATININE-BSD FRML MDRD: 85 ML/MIN/{1.73_M2}
GLUCOSE CSF-MCNC: 71 MG/DL (ref 40–70)
GLUCOSE SERPL-MCNC: 153 MG/DL (ref 70–99)
GLUCOSE UR STRIP-MCNC: NEGATIVE MG/DL
GRAM STN SPEC: NORMAL
HCT VFR BLD AUTO: 43.9 % (ref 40–53)
HGB BLD-MCNC: 15.1 G/DL (ref 13.3–17.7)
HGB UR QL STRIP: NEGATIVE
IMM GRANULOCYTES # BLD: 0 10E9/L (ref 0–0.4)
IMM GRANULOCYTES NFR BLD: 0.2 %
INTERPRETATION ECG - MUSE: NORMAL
KETONES UR STRIP-MCNC: NEGATIVE MG/DL
LACTATE BLD-SCNC: 0.9 MMOL/L (ref 0.7–2.1)
LEUKOCYTE ESTERASE UR QL STRIP: NEGATIVE
LYMPHOCYTES # BLD AUTO: 0.7 10E9/L (ref 0.8–5.3)
LYMPHOCYTES NFR BLD AUTO: 12.6 %
MCH RBC QN AUTO: 29.8 PG (ref 26.5–33)
MCHC RBC AUTO-ENTMCNC: 34.4 G/DL (ref 31.5–36.5)
MCV RBC AUTO: 87 FL (ref 78–100)
MONOCYTES # BLD AUTO: 0.6 10E9/L (ref 0–1.3)
MONOCYTES NFR BLD AUTO: 9.7 %
MUCOUS THREADS #/AREA URNS LPF: PRESENT /LPF
NEUTROPHILS # BLD AUTO: 4.4 10E9/L (ref 1.6–8.3)
NEUTROPHILS NFR BLD AUTO: 74.7 %
NITRATE UR QL: NEGATIVE
NRBC # BLD AUTO: 0 10*3/UL
NRBC BLD AUTO-RTO: 0 /100
PCO2 BLDV: 41 MM HG (ref 40–50)
PH BLDV: 7.4 PH (ref 7.32–7.43)
PH UR STRIP: 6 PH (ref 5–7)
PLATELET # BLD AUTO: 170 10E9/L (ref 150–450)
PO2 BLDV: 39 MM HG (ref 25–47)
POTASSIUM SERPL-SCNC: 3.8 MMOL/L (ref 3.4–5.3)
PROT CSF-MCNC: 56 MG/DL (ref 15–60)
RBC # BLD AUTO: 5.07 10E12/L (ref 4.4–5.9)
RBC # CSF MANUAL: 20 /UL (ref 0–2)
RBC #/AREA URNS AUTO: <1 /HPF (ref 0–2)
SAO2 % BLDV FROM PO2: 74 %
SODIUM SERPL-SCNC: 135 MMOL/L (ref 133–144)
SOURCE: ABNORMAL
SP GR UR STRIP: 1.02 (ref 1–1.03)
SPECIMEN SOURCE: NORMAL
TUBE # CSF: 4 #
UROBILINOGEN UR STRIP-MCNC: NORMAL MG/DL (ref 0–2)
WBC # BLD AUTO: 5.9 10E9/L (ref 4–11)
WBC # CSF MANUAL: 1 /UL (ref 0–5)
WBC #/AREA URNS AUTO: <1 /HPF (ref 0–5)

## 2019-09-02 PROCEDURE — 83605 ASSAY OF LACTIC ACID: CPT

## 2019-09-02 PROCEDURE — 84157 ASSAY OF PROTEIN OTHER: CPT | Performed by: EMERGENCY MEDICINE

## 2019-09-02 PROCEDURE — 25000128 H RX IP 250 OP 636: Performed by: EMERGENCY MEDICINE

## 2019-09-02 PROCEDURE — 93005 ELECTROCARDIOGRAM TRACING: CPT

## 2019-09-02 PROCEDURE — 87086 URINE CULTURE/COLONY COUNT: CPT | Performed by: EMERGENCY MEDICINE

## 2019-09-02 PROCEDURE — 82945 GLUCOSE OTHER FLUID: CPT | Performed by: EMERGENCY MEDICINE

## 2019-09-02 PROCEDURE — 81001 URINALYSIS AUTO W/SCOPE: CPT | Performed by: EMERGENCY MEDICINE

## 2019-09-02 PROCEDURE — 87205 SMEAR GRAM STAIN: CPT | Performed by: EMERGENCY MEDICINE

## 2019-09-02 PROCEDURE — 96365 THER/PROPH/DIAG IV INF INIT: CPT

## 2019-09-02 PROCEDURE — 96375 TX/PRO/DX INJ NEW DRUG ADDON: CPT

## 2019-09-02 PROCEDURE — 96361 HYDRATE IV INFUSION ADD-ON: CPT

## 2019-09-02 PROCEDURE — 99285 EMERGENCY DEPT VISIT HI MDM: CPT | Mod: 25

## 2019-09-02 PROCEDURE — 70450 CT HEAD/BRAIN W/O DYE: CPT

## 2019-09-02 PROCEDURE — 25800030 ZZH RX IP 258 OP 636: Performed by: EMERGENCY MEDICINE

## 2019-09-02 PROCEDURE — 80048 BASIC METABOLIC PNL TOTAL CA: CPT | Performed by: EMERGENCY MEDICINE

## 2019-09-02 PROCEDURE — 82803 BLOOD GASES ANY COMBINATION: CPT

## 2019-09-02 PROCEDURE — 87070 CULTURE OTHR SPECIMN AEROBIC: CPT | Performed by: EMERGENCY MEDICINE

## 2019-09-02 PROCEDURE — 87040 BLOOD CULTURE FOR BACTERIA: CPT | Performed by: EMERGENCY MEDICINE

## 2019-09-02 PROCEDURE — 62270 DX LMBR SPI PNXR: CPT

## 2019-09-02 PROCEDURE — 25000132 ZZH RX MED GY IP 250 OP 250 PS 637: Performed by: EMERGENCY MEDICINE

## 2019-09-02 PROCEDURE — 89050 BODY FLUID CELL COUNT: CPT | Performed by: EMERGENCY MEDICINE

## 2019-09-02 PROCEDURE — 85025 COMPLETE CBC W/AUTO DIFF WBC: CPT | Performed by: EMERGENCY MEDICINE

## 2019-09-02 PROCEDURE — 87015 SPECIMEN INFECT AGNT CONCNTJ: CPT | Performed by: EMERGENCY MEDICINE

## 2019-09-02 RX ORDER — KETOROLAC TROMETHAMINE 15 MG/ML
15 INJECTION, SOLUTION INTRAMUSCULAR; INTRAVENOUS ONCE
Status: COMPLETED | OUTPATIENT
Start: 2019-09-02 | End: 2019-09-02

## 2019-09-02 RX ORDER — HYDROMORPHONE HYDROCHLORIDE 1 MG/ML
0.5 INJECTION, SOLUTION INTRAMUSCULAR; INTRAVENOUS; SUBCUTANEOUS
Status: COMPLETED | OUTPATIENT
Start: 2019-09-02 | End: 2019-09-02

## 2019-09-02 RX ORDER — SODIUM CHLORIDE 9 MG/ML
1000 INJECTION, SOLUTION INTRAVENOUS CONTINUOUS
Status: DISCONTINUED | OUTPATIENT
Start: 2019-09-02 | End: 2019-09-02 | Stop reason: HOSPADM

## 2019-09-02 RX ORDER — CEFTRIAXONE 2 G/1
2 INJECTION, POWDER, FOR SOLUTION INTRAMUSCULAR; INTRAVENOUS ONCE
Status: COMPLETED | OUTPATIENT
Start: 2019-09-02 | End: 2019-09-02

## 2019-09-02 RX ORDER — ACETAMINOPHEN 500 MG
500 TABLET ORAL ONCE
Status: COMPLETED | OUTPATIENT
Start: 2019-09-02 | End: 2019-09-02

## 2019-09-02 RX ADMIN — ACETAMINOPHEN 500 MG: 500 TABLET, FILM COATED ORAL at 09:06

## 2019-09-02 RX ADMIN — CEFTRIAXONE SODIUM 2 G: 2 INJECTION, POWDER, FOR SOLUTION INTRAMUSCULAR; INTRAVENOUS at 06:53

## 2019-09-02 RX ADMIN — SODIUM CHLORIDE 1000 ML: 9 INJECTION, SOLUTION INTRAVENOUS at 08:56

## 2019-09-02 RX ADMIN — KETOROLAC TROMETHAMINE 15 MG: 15 INJECTION, SOLUTION INTRAMUSCULAR; INTRAVENOUS at 08:59

## 2019-09-02 RX ADMIN — SODIUM CHLORIDE 1000 ML: 9 INJECTION, SOLUTION INTRAVENOUS at 06:16

## 2019-09-02 RX ADMIN — HYDROMORPHONE HYDROCHLORIDE 0.5 MG: 1 INJECTION, SOLUTION INTRAMUSCULAR; INTRAVENOUS; SUBCUTANEOUS at 06:20

## 2019-09-02 ASSESSMENT — MIFFLIN-ST. JEOR: SCORE: 2129

## 2019-09-02 NOTE — DISCHARGE INSTRUCTIONS
Discharge Instructions  Headache    You were seen today for a headache. Headaches may be caused by many different things such as muscle tension, sinus inflammation, anxiety and stress, having too little sleep, too much alcohol, some medical conditions or injury. You may have a migraine, which is caused by changes in the blood vessels in your head.  At this time your provider does not find that your headache is a sign of anything dangerous or life-threatening.  However, sometimes the signs of serious illness do not show up right away.      Generally, every Emergency Department visit should have a follow-up clinic visit with either a primary or a specialty clinic/provider. Please follow-up as instructed by your emergency provider today.    Return to the Emergency Department if:  You get a new fever of 100.4 F or higher.  Your headache gets much worse.  You get a stiff neck with your headache.  You get a new headache that is significantly different or worse than headaches you have had before.  You are vomiting (throwing up) and cannot keep food or water down.  You have blurry or double vision or other problems with your eyes.  You have a new weakness on one side of your body.  You have difficulty with balance which is new.  You or your family thinks you are confused.  You have a seizure.    What can I do to help myself?  Pain medications - You may take a pain medication such as Tylenol  (acetaminophen), Advil , Motrin  (ibuprofen) or Aleve  (naproxen).  Take a pain reliever as soon as you notice symptoms.  Starting medications as soon as you start to have symptoms may lessen the amount of pain you have.  Relaxing in a quiet, dark room may help.  Get enough sleep and eat meals regularly.  You may need to watch for certain foods or other things which may trigger your headaches.  Keeping a journal of your headaches and possible triggers may help you and your primary provider to identify things which you should avoid which  "may be causing your headaches.  If you were given a prescription for medicine here today, be sure to read all of the information (including the package insert) that comes with your prescription.  This will include important information about the medicine, its side effects, and any warnings that you need to know about.  The pharmacist who fills the prescription can provide more information and answer questions you may have about the medicine.  If you have questions or concerns that the pharmacist cannot address, please call or return to the Emergency Department.   Remember that you can always come back to the Emergency Department if you are not able to see your regular provider in the amount of time listed above, if you get any new symptoms, or if there is anything that worries you.  Discharge Instructions  Otitis Media  You or your child have an ear infection known as otitis media or middle ear infection (otitis = ear, media = middle). These infections often develop after a viral infection, such as a cold. The cold causes swelling around the pressure-equalizing tube of the ear, which allows fluid to build up in the space behind the eardrum (the middle ear). This fluid build-up can trap bacteria and viruses and increase pressure on the eardrum causing pain. Symptoms of an ear infection can include earache/pain and decreased hearing loss. These symptoms often come on suddenly. For children, symptoms may include fever (temperature >100.4 F), pulling on the ear, fussiness, and decreased activity/appetite.  Generally, every Emergency Department visit should have a follow-up clinic visit with either a primary or a specialty clinic/provider. Please follow-up as instructed by your emergency provider today.    Return to the Emergency Department if:  Your child becomes very fussy or weak.  The symptoms get worse, or if you develop a severe headache, stiff neck, or new symptoms.    Treatment:  The \"best\" treatment depends on " your age, history of previous infections, and any underlying medical problems.  Antibiotics are not given to every patient with an ear infection because studies show that many people with ear infections will improve without using antibiotics. Because antibiotics can have side effects such as diarrhea and stomach upset and can also cause severe allergic reactions, providers are trying to avoid using antibiotics if it is safe for the patient to do so.   In these cases, a prescription for antibiotics may be given to be filled in 24 -48 hours if symptoms are getting worse or not improving (this is often called  wait and see  treatment). If the symptoms are improving, the antibiotic does not need to be taken.   Remember, antibiotics do not treat pain.    Pain medications. You may take a pain medication such as Tylenol  (acetaminophen), Advil  (ibuprofen), Nuprin  (ibuprofen) or Aleve  (naproxen).    Complications:    Tympanic membrane rupture - One possible complication of an ear infection is rupture of the tympanic membrane, or ear drum. This happens because of pressure on the tympanic membrane from the infected fluid. When the tympanic membrane ruptures, you may have pus or blood drain from the ear. It does not hurt when the membrane ruptures, and many people actually feel better because pressure is released. Fortunately, the tympanic membrane usually heals quickly after rupturing, within hours to days. You should keep water out of the ear until you re-check with your provider to be sure the ear drum has healed.     Mastoiditis - Rarely, the area behind the ear can become infected, this area is called the mastoid.  If you notice redness and swelling behind your ear, see your provider or return to the Emergency Department immediately.      Hearing loss - The fluid that collects behind the eardrum (called an effusion) can persist for weeks to months after the pain of an ear infection resolves. An effusion causes trouble  hearing, which is usually temporary. If the fluid persists, however, it can interfere with the process of learning to speak.   For this reason, children under 2 need to be seen by their pediatrician WITHIN 3 MONTHS to ensure that the fluid has resolved.  If you were given a prescription for medicine here today, be sure to read all of the information (including the package insert) that comes with your prescription.  This will include important information about the medicine, its side effects, and any warnings that you need to know about.  The pharmacist who fills the prescription can provide more information and answer questions you may have about the medicine.  If you have questions or concerns that the pharmacist cannot address, please call or return to the Emergency Department.   Remember that you can always come back to the Emergency Department if you are not able to see your regular provider in the amount of time listed above, if you get any new symptoms, or if there is anything that worries you.

## 2019-09-02 NOTE — ED PROVIDER NOTES
"  History     Chief Complaint:  Headache    The history is provided by the patient.      Romelia Crystal is a 54 year old male who presents with his wife for a headache. Three days ago (8/30/2019), patient had an onset of a headache that radiates to his eyes and the posterior of his neck. The next day, patient had an onset of a fever that increased to 101.5F. Since then, patient's headache has worsened and his fever has been persistent until today, prompting him to the ED. Here, patient states he is also nauseated but denies vomiting and any immunocompromising diseases.      Allergies:  Hmg-Coa-R Inhibitors  Lisinopril     Medications:    Albuterol neb solution   Albuterol inhaler  Norvasc   Breo ellipta   Wellbutrin  Crestor   Effexor      Past Medical History:   Elevated prostate specific antigen   Morbid obesity  ADALGISA  Diverticulitis  Moderate persistent asthma   Decreased libido   HTN   Eczema   Hives   BPPV   Major depression   HLD   Allergic rhinitis      Past Surgical History:    Tonsillectomy  [place] teeth extraction     Family History:    HLD - father   Lymphoma   Diverticulitis     Social History:  Negative for tobacco use.  Positive for alcohol use.   Negative for drug use.  Marital Status:  .     10 point review of systems performed and is negative except as above and in HPI.    Physical Exam     Patient Vitals for the past 24 hrs:   BP Temp Temp src Pulse Heart Rate Resp SpO2 Height Weight   09/02/19 1007 122/84 -- -- 73 75 15 94 % -- --   09/02/19 0829 -- 98  F (36.7  C) Oral -- -- -- -- -- --   09/02/19 0807 -- -- -- -- 87 11 95 % -- --   09/02/19 0806 (!) 138/91 -- -- 82 81 12 93 % -- --   09/02/19 0743 111/86 -- -- 89 88 15 95 % -- --   09/02/19 0741 106/77 -- -- 85 87 19 95 % -- --   09/02/19 0635 (!) 136/96 -- -- 93 93 23 93 % -- --   09/02/19 0559 (!) 150/101 100.1  F (37.8  C) Oral -- 104 18 96 % 1.778 m (5' 10\") 129.3 kg (285 lb)     Physical Exam  General: Resting on the gurney, appears " uncomfortable  Head:  The scalp, face, and head appear normal. No evidence of injury  Mouth/Throat: Mucus membranes are moist  Neck:  Nuchal rigidity present  CV:  Regular rate    Normal S1 and S2  No pathological murmur   Resp:  Breath sounds clear and equal bilaterally    Non-labored, no retractions or accessory muscle use    No coarseness    No wheezing   GI:  Abdomen is soft, no rigidity    No tenderness to palpation  MS:  Normal motor assessment of all extremities.    Good capillary refill noted.  Skin:   No rash or lesions noted.  Neuro:  Speech is normal and fluent. No apparent deficit. CN II-XII intact by examination. Sensation and strength intact x4  Psych: Awake. Alert.  Normal affect.      Appropriate interactions.    Emergency Department Course   ECG:  Indication: Headache  Time: 0607  Vent. Rate 97 bpm. OH interval 188. QRS duration 94. QT/QTc 358/454. P-R-T axis 43 32 30. Normal sinus rhythm. Incomplete right bundle branch block. Borderline ECG. Read time: 0608.    Imaging:  Radiographic findings were communicated with the patient who voiced understanding of the findings.    CT Head w/o Contrast   Final Result   IMPRESSION:   1.  Normal for age.   2.  No CT finding of a mass, infarct or hemorrhage.     As per radiology.      Laboratory:  CBC: WBC: 5.9, HGB: 15.1, PLT: 170  BMP: Glucose 153 (H), o/w WNL (Creatinine: 0.99)  0616 ISTAT VBG: pH 7.40 / PCO2 40 / PO2 39 / Bicarb 26 / Lactic acid 0.9  Blood culture x2: in process.   CSF culture: in process.   Gram stain: in process.    Glucose CSF: 71 (H)   Cell count with differential CSF: RBC 20 (H), o/w WNL.   Protein total CSF: 56    UA with micro: yellow, clear, protein albumin 10, mucous present, o/w negative  Urine culture: in process.     Procedures:    Lumbar Puncture      Indication:  headache and fever       Consent:  Risks (including but not limited to; infection, bleeding, spinal headache with possibility of spinal patch and temporary or  permanent neurologic injury), benefits and alternatives were discussed with patient and consent for procedure was obtained.     Timeout:  Universal protocol was followed. TIME OUT conducted just prior to starting procedure confirmed patient identity, site/side, procedure, patient position, and availability of correct equipment and implants? Yes     Medication:  Lidocaine: Local infiltration     Procedure Note:  Patient was placed in a sitting, supported by bedside stand position.  The low back was prepped with Betadine.  The patient was medicated as above.  A spinal needle was used to gain access to the subarachnoid space with stylet in place. Opening pressure was not checked.  The fluid was clear.  Stylet was replaced and needle withdrawn.     Patient Status:  Patient tolerated the procedure well.  There were no complications.    Interventions:  0616 NS 1L IV  0620 Dilaudid 0.5 mg IV   0653 Rocephin 2 g IV  0859 Toradol 15 mg IV  0906 Tylenol tablet 500 mg PO     Emergency Department Course:  0608 Nursing notes and vitals reviewed. I performed an exam of the patient as documented above.     EKG obtained in the ED, see results above.     IV inserted. Medicine administered as documented above. Blood drawn. This was sent to the lab for further testing, results above. The patient provided a urine sample here in the emergency department. This was sent for laboratory testing, findings above.     The patient was sent for a head CT while in the emergency department, findings above.     0645 I performed a lumbar puncture on the patient per procedure note above.     1023 I rechecked the patient and discussed the results of his workup thus far.     Findings and plan explained to the Patient. Patient discharged home with instructions regarding supportive care, medications, and reasons to return. The importance of close follow-up was reviewed. The patient was prescribed Augmentin.    I personally reviewed the laboratory results  with the Patient and answered all related questions prior to discharge.     Impression & Plan    Medical Decision Making:  Romelia Crystal is a 54 year old male who presents to the emergency department with headache and fever. I was concerned for meningitis given his signs of meningeal irritation therefore head CT was obtained in anticipation of LP. Antibiotics were started within one hour of arrival and LP was performed. LP was negative for acute meningitis.     Normal LP findings were discussed with patient who is very reassured. They are comfortable with further management as an outpatient for probable viral syndrome. They will treat his headache with Ibuprofen and Tylenol. They were made aware for potential for post LP headache and will return for any worsening or worrisome symptoms. Patient is discharged in good condition and will follow up as an outpatient.     Critical Care time:  none    Diagnosis:    ICD-10-CM    1. Acute nonintractable headache, unspecified headache type R51 UA with Microscopic     Urine Culture     Gram stain   2. OME (otitis media with effusion), left H65.92      Disposition:  discharged to home with wife.     Discharge Medications:  New Prescriptions    AMOXICILLIN-CLAVULANATE (AUGMENTIN) 875-125 MG TABLET    Take 1 tablet by mouth 2 times daily for 10 days     Scribe Disposition  I, Becka Fields, am serving as a scribe on 9/2/2019 at 6:14 AM to personally document services performed by Ale Snyder MD based on my observations and the provider's statements to me.     Becka Fields  9/2/2019    EMERGENCY DEPARTMENT       Ale Snyder MD  09/06/19 6375

## 2019-09-02 NOTE — ED AVS SNAPSHOT
Emergency Department  [address] [facility] [address]  Phone:  [phone]  Fax:  [phone]                                    Romelia Crystal   MRN: [id number]    Department:   Emergency Department   Date of Visit:  9/2/2019           After Visit Summary Signature Page    I have received my discharge instructions, and my questions have been answered. I have discussed any challenges I see with this plan with the nurse or doctor.    ..........................................................................................................................................  Patient/Patient Representative Signature      ..........................................................................................................................................  Patient Representative Print Name and Relationship to Patient    ..................................................               ................................................  Date                                   Time    ..........................................................................................................................................  Reviewed by Signature/Title    ...................................................              ..............................................  Date                                               Time          22EPIC Rev 08/18

## 2019-09-03 ENCOUNTER — OFFICE VISIT (OUTPATIENT)
Dept: FAMILY MEDICINE | Facility: CLINIC | Age: 54
End: 2019-09-03
Payer: COMMERCIAL

## 2019-09-03 VITALS
OXYGEN SATURATION: 96 % | BODY MASS INDEX: 35.68 KG/M2 | TEMPERATURE: 98.3 F | HEIGHT: 70 IN | HEART RATE: 98 BPM | WEIGHT: 249.2 LBS | SYSTOLIC BLOOD PRESSURE: 117 MMHG | DIASTOLIC BLOOD PRESSURE: 86 MMHG

## 2019-09-03 DIAGNOSIS — B34.9 VIRAL ILLNESS: Primary | ICD-10-CM

## 2019-09-03 DIAGNOSIS — R51.9 NONINTRACTABLE HEADACHE, UNSPECIFIED CHRONICITY PATTERN, UNSPECIFIED HEADACHE TYPE: ICD-10-CM

## 2019-09-03 DIAGNOSIS — E78.5 HYPERLIPIDEMIA WITH TARGET LDL LESS THAN 130: ICD-10-CM

## 2019-09-03 LAB
BACTERIA SPEC CULT: NORMAL
BASOPHILS # BLD AUTO: 0 10E9/L (ref 0–0.2)
BASOPHILS NFR BLD AUTO: 0.7 %
DIFFERENTIAL METHOD BLD: NORMAL
EOSINOPHIL # BLD AUTO: 0.2 10E9/L (ref 0–0.7)
EOSINOPHIL NFR BLD AUTO: 4.5 %
ERYTHROCYTE [DISTWIDTH] IN BLOOD BY AUTOMATED COUNT: 13.4 % (ref 10–15)
HCT VFR BLD AUTO: 42.5 % (ref 40–53)
HGB BLD-MCNC: 14.6 G/DL (ref 13.3–17.7)
LYMPHOCYTES # BLD AUTO: 1.1 10E9/L (ref 0.8–5.3)
LYMPHOCYTES NFR BLD AUTO: 24.2 %
Lab: NORMAL
MCH RBC QN AUTO: 30.5 PG (ref 26.5–33)
MCHC RBC AUTO-ENTMCNC: 34.4 G/DL (ref 31.5–36.5)
MCV RBC AUTO: 89 FL (ref 78–100)
MONOCYTES # BLD AUTO: 0.7 10E9/L (ref 0–1.3)
MONOCYTES NFR BLD AUTO: 14.8 %
NEUTROPHILS # BLD AUTO: 2.5 10E9/L (ref 1.6–8.3)
NEUTROPHILS NFR BLD AUTO: 55.8 %
PLATELET # BLD AUTO: 191 10E9/L (ref 150–450)
RBC # BLD AUTO: 4.78 10E12/L (ref 4.4–5.9)
SPECIMEN SOURCE: NORMAL
WBC # BLD AUTO: 4.5 10E9/L (ref 4–11)

## 2019-09-03 PROCEDURE — 85025 COMPLETE CBC W/AUTO DIFF WBC: CPT | Performed by: INTERNAL MEDICINE

## 2019-09-03 PROCEDURE — 86789 WEST NILE VIRUS ANTIBODY: CPT | Mod: 90 | Performed by: INTERNAL MEDICINE

## 2019-09-03 PROCEDURE — 86618 LYME DISEASE ANTIBODY: CPT | Performed by: INTERNAL MEDICINE

## 2019-09-03 PROCEDURE — 99214 OFFICE O/P EST MOD 30 MIN: CPT | Performed by: INTERNAL MEDICINE

## 2019-09-03 PROCEDURE — 80061 LIPID PANEL: CPT | Performed by: INTERNAL MEDICINE

## 2019-09-03 PROCEDURE — 36415 COLL VENOUS BLD VENIPUNCTURE: CPT | Performed by: INTERNAL MEDICINE

## 2019-09-03 PROCEDURE — 99000 SPECIMEN HANDLING OFFICE-LAB: CPT | Performed by: INTERNAL MEDICINE

## 2019-09-03 RX ORDER — ROSUVASTATIN CALCIUM 10 MG/1
10 TABLET, COATED ORAL DAILY
Qty: 90 TABLET | Refills: 3 | Status: SHIPPED | OUTPATIENT
Start: 2019-09-03 | End: 2020-10-08

## 2019-09-03 ASSESSMENT — PATIENT HEALTH QUESTIONNAIRE - PHQ9: SUM OF ALL RESPONSES TO PHQ QUESTIONS 1-9: 5

## 2019-09-03 ASSESSMENT — MIFFLIN-ST. JEOR: SCORE: 1966.61

## 2019-09-03 NOTE — PATIENT INSTRUCTIONS
(B34.9) Viral illness  (primary encounter diagnosis)  Comment: We will refer to infectious disease and check CBC as well as west [place]ile viral antibody titers today  Plan: CBC with platelets and differential, [place]         Virus Antibody IgG, INFECTIOUS DISEASE REFERRAL            (E78.5) Hyperlipidemia with target LDL less than 130  Comment: Continue statin and check fasting lipid panel when you are able  Plan: rosuvastatin (CRESTOR) 10 MG tablet, Lipid         panel reflex to direct LDL Fasting            (R51) Nonintractable headache, unspecified chronicity pattern, unspecified headache type  Comment: as above - Continue to use acetaminophen   Plan: CBC with platelets and differential, [place]         Virus Antibody IgG, INFECTIOUS DISEASE REFERRAL

## 2019-09-03 NOTE — PROGRESS NOTES
"Subjective     Romelia Crystal is a 54 year old male who presents to clinic today for the following health issues:    HPI   ED/UC Followup:    Facility:   ED  Date of visit: 9/2/2019  Reason for visit: Acute nonintractable headache, OME (otitis media with effusion), left   Current Status: Patient report that he is still experiencing headaches and fever.     [facility]  CLINIC PROGRESS NOTE    Subjective:  Headache   Romelia Crystal was recently seen in the emergency department yesterday for non-intractable headache with nausea and fever.  Labs and imaging performed showed normal head CT without hemorrhage or mass and normal appearing in the sinus.   He did have a lumbar puncture and normal white blood cell count and normal hemoglobin.  He has been bitten by many mosquitos and maybe some ticks.  He was started on Augmentin for possible otitis media.      Past medical history, medications, allergies, social history, family history reviewed and updated in [facility] as of 9/3/2019 .    ROS  CONSTITUTIONAL: no fatigue, no unexpected change in weight  SKIN: no worrisome rashes, no worrisome moles, no worrisome lesions  EYES: no acute vision problems or changes  ENT: no ear problems, no mouth problems, no throat problems  RESP: no significant cough, no shortness of breath  CV: no chest pain, no palpitations, no new or worsening peripheral edema  GI: no nausea, no vomiting, no constipation, no diarrhea  : following in urology  MS: as above   PSYCHIATRIC: no changes in mood or affect      Objective:  Vitals  /86 (BP Location: Left arm, Patient Position: Sitting, Cuff Size: Adult Regular)   Pulse 98   Temp 98.3  F (36.8  C) (Tympanic)   Ht 1.778 m (5' 10\")   Wt 113 kg (249 lb 3.2 oz)   SpO2 96%   BMI 35.76 kg/m    GEN: Alert Oriented x3 NAD  HEENT: Atraumatic, normocephalic, neck supple, no thyromegaly, negative cervical adenopathy  TM: TM bilaterally pearly and grey with normal light reflex  CV: RRR no " murmurs or rubs  PULM: CTA no wheezes or crackles  ABD: Soft, nontender nondistended, no hepatosplenomegally  SKIN: No visible skin lesion or ulcerations  EXT: 2+ dorsal pedis pulses, no edema bilateral lower extremities  NEURO: Gait and station deferred, No focal neurologic deficits  PSYCH: Mood good, affect mood congruent    No images are attached to the encounter.    No results found for this or any previous visit (from the past 24 hour(s)).    Assessment/Plan:  Patient Instructions   (B34.9) Viral illness  (primary encounter diagnosis)  Comment: We will refer to infectious disease and check CBC as well as [place] viral antibody titers today  Plan: CBC with platelets and differential, [place]         Virus Antibody IgG, INFECTIOUS DISEASE REFERRAL            (E78.5) Hyperlipidemia with target LDL less than 130  Comment: Continue statin and check fasting lipid panel when you are able  Plan: rosuvastatin (CRESTOR) 10 MG tablet, Lipid         panel reflex to direct LDL Fasting            (R51) Nonintractable headache, unspecified chronicity pattern, unspecified headache type  Comment: as above - Continue to use acetaminophen   Plan: CBC with platelets and differential, [place]         Virus Antibody IgG, INFECTIOUS DISEASE REFERRAL               Follow up in 1-2 weeks    Disclaimer: This note consists of symbols derived from keyboarding, dictation and/or voice recognition software. As a result, there may be errors in the script that have gone undetected. Please consider this when interpreting information found in this chart.    Cullen Jett MD  [phone]

## 2019-09-03 NOTE — RESULT ENCOUNTER NOTE
Final urine culture report is NEGATIVE per [place] ED Lab Result protocol.    If NEGATIVE result, no change in treatment, per [place] ED Lab Result protocol.

## 2019-09-04 LAB — B BURGDOR IGG+IGM SER QL: 0.03 (ref 0–0.89)

## 2019-09-04 ASSESSMENT — ASTHMA QUESTIONNAIRES: ACT_TOTALSCORE: 25

## 2019-09-05 ENCOUNTER — DOCUMENTATION ONLY (OUTPATIENT)
Dept: FAMILY MEDICINE | Facility: CLINIC | Age: 54
End: 2019-09-05

## 2019-09-05 NOTE — PROGRESS NOTES
Patient came to lab 9/3/2019 and extra tubes where drawn. The tubes drawn where: Green Gel. Please contact the lab if you add tests to these tubes. Tubes will be disposed of on 9/9/2019.     There is a future-ed lipid panel placed on the day of appointment. Please contact lab if this was meant to be a same day order and not a future lab.

## 2019-09-06 LAB
CHOLEST SERPL-MCNC: 179 MG/DL
HDLC SERPL-MCNC: 43 MG/DL
LDLC SERPL CALC-MCNC: 83 MG/DL
NONHDLC SERPL-MCNC: 136 MG/DL
TRIGL SERPL-MCNC: 263 MG/DL
WNV IGG SER-ACNC: 0.17 IV

## 2019-09-07 LAB
BACTERIA SPEC CULT: NO GROWTH
Lab: NORMAL
SPECIMEN SOURCE: NORMAL

## 2019-09-07 NOTE — RESULT ENCOUNTER NOTE
Martinez León,    I had the opportunity to review your recent labs and a summary of your labs reads as follows:    Your complete blood counts show no sign of anemia, normal white blood cell count and platelets.  Your fasting lipid panel show  - normal HDL (good) cholesterol -as your goal is greater than 40  - low LDL (bad) cholesterol as your goal is less than 130  - stable triglyceride levels  Your [place] Nile and Lyme titers both returned negative      Sincerely,  Cullen Jett MD

## 2019-09-08 LAB
BACTERIA SPEC CULT: NO GROWTH
BACTERIA SPEC CULT: NO GROWTH
Lab: NORMAL
Lab: NORMAL
SPECIMEN SOURCE: NORMAL
SPECIMEN SOURCE: NORMAL

## 2019-12-09 ENCOUNTER — HEALTH MAINTENANCE LETTER (OUTPATIENT)
Age: 54
End: 2019-12-09

## 2020-03-03 DIAGNOSIS — I10 ESSENTIAL HYPERTENSION, BENIGN: ICD-10-CM

## 2020-03-04 RX ORDER — AMLODIPINE BESYLATE 5 MG/1
TABLET ORAL
Qty: 30 TABLET | Refills: 0 | Status: SHIPPED | OUTPATIENT
Start: 2020-03-04 | End: 2020-04-01

## 2020-03-04 NOTE — TELEPHONE ENCOUNTER
Medication is being filled for 1 time refill only due to:  Patient needs to be seen because due for physical/appt with PCP.   Elham WALTERS RN

## 2020-03-04 NOTE — TELEPHONE ENCOUNTER
"Last Written Prescription Date:  7/24/19  Last Fill Quantity: 90,  # refills: 0   Last office visit: 9/3/2019 with prescribing provider:     Future Office Visit:    Requested Prescriptions   Pending Prescriptions Disp Refills     amLODIPine (NORVASC) 5 MG tablet [Pharmacy Med Name: AMLODIPINE BESYLATE 5MG TABLETS] 90 tablet 0     Sig: TAKE 1 TABLET BY MOUTH EVERY DAY       Calcium Channel Blockers Protocol  Passed - 3/3/2020  8:38 PM        Passed - Blood pressure under 140/90 in past 12 months     BP Readings from Last 3 Encounters:   09/03/19 117/86   09/02/19 122/84   11/27/18 130/87                 Passed - Recent (12 mo) or future (30 days) visit within the authorizing provider's specialty     Patient has had an office visit with the authorizing provider or a provider within the authorizing providers department within the previous 12 mos or has a future within next 30 days. See \"Patient Info\" tab in inbasket, or \"Choose Columns\" in Meds & Orders section of the refill encounter.              Passed - Medication is active on med list        Passed - Patient is age 18 or older        Passed - Normal serum creatinine on file in past 12 months     Recent Labs   Lab Test 09/02/19  0618   CR 0.99               "

## 2020-03-31 DIAGNOSIS — I10 ESSENTIAL HYPERTENSION, BENIGN: ICD-10-CM

## 2020-03-31 NOTE — TELEPHONE ENCOUNTER
"Last Written Prescription Date:  3/04/20  Last Fill Quantity: 30 tablet,  # refills: 0   Last office visit: 11/27/2018 with prescribing provider:  Debby   Future Office Visit:        Requested Prescriptions   Pending Prescriptions Disp Refills     amLODIPine (NORVASC) 5 MG tablet [Pharmacy Med Name: AMLODIPINE BESYLATE 5MG TABLETS] 30 tablet 0     Sig: TAKE 1 TABLET BY MOUTH EVERY DAY       Calcium Channel Blockers Protocol  Passed - 3/31/2020  3:53 PM        Passed - Blood pressure under 140/90 in past 12 months     BP Readings from Last 3 Encounters:   09/03/19 117/86   09/02/19 122/84   11/27/18 130/87                 Passed - Recent (12 mo) or future (30 days) visit within the authorizing provider's specialty     Patient has had an office visit with the authorizing provider or a provider within the authorizing providers department within the previous 12 mos or has a future within next 30 days. See \"Patient Info\" tab in inbasket, or \"Choose Columns\" in Meds & Orders section of the refill encounter.              Passed - Medication is active on med list        Passed - Patient is age 18 or older        Passed - Normal serum creatinine on file in past 12 months     Recent Labs   Lab Test 09/02/19  0618   CR 0.99       Ok to refill medication if creatinine is low               "

## 2020-04-01 RX ORDER — AMLODIPINE BESYLATE 5 MG/1
TABLET ORAL
Qty: 90 TABLET | Refills: 0 | Status: SHIPPED | OUTPATIENT
Start: 2020-04-01 | End: 2020-07-15

## 2020-07-13 DIAGNOSIS — I10 ESSENTIAL HYPERTENSION, BENIGN: ICD-10-CM

## 2020-07-15 RX ORDER — AMLODIPINE BESYLATE 5 MG/1
TABLET ORAL
Qty: 90 TABLET | Refills: 0 | Status: SHIPPED | OUTPATIENT
Start: 2020-07-15 | End: 2020-10-08

## 2020-10-08 DIAGNOSIS — I10 ESSENTIAL HYPERTENSION, BENIGN: ICD-10-CM

## 2020-10-08 DIAGNOSIS — E78.5 HYPERLIPIDEMIA WITH TARGET LDL LESS THAN 130: ICD-10-CM

## 2020-10-08 RX ORDER — ROSUVASTATIN CALCIUM 10 MG/1
10 TABLET, COATED ORAL DAILY
Qty: 30 TABLET | Refills: 0 | Status: SHIPPED | OUTPATIENT
Start: 2020-10-08 | End: 2020-11-16

## 2020-10-08 RX ORDER — AMLODIPINE BESYLATE 5 MG/1
TABLET ORAL
Qty: 30 TABLET | Refills: 0 | Status: SHIPPED | OUTPATIENT
Start: 2020-10-08 | End: 2020-11-20

## 2020-10-08 NOTE — TELEPHONE ENCOUNTER
Routing refill request to provider for review/approval because:  Labs not current:  Creat, BP.  Due for apt- added in pharm comments.  Please authorize if appropriate.  Thanks,  Angy Flores RN

## 2020-10-08 NOTE — TELEPHONE ENCOUNTER
Rosuvastatin:    Last Written Prescription Date:  9/3/2019  Last Fill Quantity: 90,  # refills: 3   Last office visit: 9/3/2019 with prescribing provider:  Yes, Dr. Jett  Future Office Visit:      Medication is being filled for 1 time refill only due to:  Patient needs to be seen because it has been more than one year since last visit.     Due for physical and fasting labs.  Brina De La Fuente RN

## 2020-11-16 DIAGNOSIS — E78.5 HYPERLIPIDEMIA WITH TARGET LDL LESS THAN 130: ICD-10-CM

## 2020-11-16 NOTE — TELEPHONE ENCOUNTER
LOV 9-3-2019 BI, 11- LL  No future OV scheduled    SIG & Pharm note given  Will send MyC    30 day jayda already given    Nicole Wayne RT (R)

## 2020-11-17 RX ORDER — ROSUVASTATIN CALCIUM 10 MG/1
10 TABLET, COATED ORAL DAILY
Qty: 30 TABLET | Refills: 0 | Status: SHIPPED | OUTPATIENT
Start: 2020-11-17 | End: 2020-12-18

## 2020-11-20 DIAGNOSIS — I10 ESSENTIAL HYPERTENSION, BENIGN: ICD-10-CM

## 2020-11-23 RX ORDER — AMLODIPINE BESYLATE 5 MG/1
5 TABLET ORAL DAILY
Qty: 90 TABLET | Refills: 0 | Status: SHIPPED | OUTPATIENT
Start: 2020-11-23 | End: 2021-01-12

## 2020-12-17 DIAGNOSIS — E78.5 HYPERLIPIDEMIA WITH TARGET LDL LESS THAN 130: ICD-10-CM

## 2020-12-18 RX ORDER — ROSUVASTATIN CALCIUM 10 MG/1
10 TABLET, COATED ORAL DAILY
Qty: 30 TABLET | Refills: 0 | Status: SHIPPED | OUTPATIENT
Start: 2020-12-18 | End: 2021-01-12

## 2020-12-18 NOTE — TELEPHONE ENCOUNTER
A 30 day supply is given, patient is due for an office visit.  Patient has appointment with PCP on 1/12/2021.    MO Andres, RN  [facility]

## 2021-01-12 ENCOUNTER — VIRTUAL VISIT (OUTPATIENT)
Dept: FAMILY MEDICINE | Facility: CLINIC | Age: 56
End: 2021-01-12
Payer: COMMERCIAL

## 2021-01-12 DIAGNOSIS — I10 ESSENTIAL HYPERTENSION, BENIGN: Primary | ICD-10-CM

## 2021-01-12 DIAGNOSIS — E78.5 HYPERLIPIDEMIA WITH TARGET LDL LESS THAN 130: ICD-10-CM

## 2021-01-12 DIAGNOSIS — N52.9 ERECTILE DYSFUNCTION, UNSPECIFIED ERECTILE DYSFUNCTION TYPE: ICD-10-CM

## 2021-01-12 DIAGNOSIS — J45.40 MODERATE PERSISTENT ASTHMA, UNCOMPLICATED: ICD-10-CM

## 2021-01-12 DIAGNOSIS — F33.0 MILD RECURRENT MAJOR DEPRESSION (H): ICD-10-CM

## 2021-01-12 DIAGNOSIS — Z12.5 SCREENING FOR PROSTATE CANCER: ICD-10-CM

## 2021-01-12 DIAGNOSIS — E66.01 MORBID OBESITY (H): ICD-10-CM

## 2021-01-12 PROCEDURE — 99214 OFFICE O/P EST MOD 30 MIN: CPT | Mod: 95 | Performed by: PHYSICIAN ASSISTANT

## 2021-01-12 RX ORDER — ROSUVASTATIN CALCIUM 10 MG/1
10 TABLET, COATED ORAL DAILY
Qty: 90 TABLET | Refills: 3 | Status: SHIPPED | OUTPATIENT
Start: 2021-01-12 | End: 2022-01-25

## 2021-01-12 RX ORDER — SILDENAFIL CITRATE 20 MG/1
TABLET ORAL
Qty: 20 TABLET | Refills: 1 | Status: SHIPPED | OUTPATIENT
Start: 2021-01-12

## 2021-01-12 RX ORDER — AMLODIPINE BESYLATE 5 MG/1
5 TABLET ORAL DAILY
Qty: 90 TABLET | Refills: 0 | Status: SHIPPED | OUTPATIENT
Start: 2021-01-12 | End: 2021-06-04

## 2021-01-12 NOTE — PROGRESS NOTES
Romelia is a 55 year old who is being evaluated via a billable video visit.      How would you like to obtain your AVS? Kontest  If the video visit is dropped, the invitation should be resent by: Text to cell phone: [phone]  Will anyone else be joining your video visit? No      Video Start Time: 12:29 PM  There is bad feedback so transitioned to tele visit      Subjective     Romelia is a 55 year old who presents to clinic today for the following health issues:     Hyperlipidemia: tolerates crestor without SE.     HTN: Pt sent BP readings via [employer] and these show BP well controlled on norvasc alone.    Asthma: followed by his allergist.  Asthma with Breo and Singulair well controlled and rarely using albuterol. Had nasal polyps removed which helped a lot and he can breath through nose so much better now.    Depression: Pt on wellbutrin and effexor prescribed by Dr. Rivera Dinero    ED: Pt have trouble with erections for the past 1.5 years and this worse since the lockdown    * he did get his flu shot in Oct.    Recent Labs   Lab Test 09/03/19  1053 03/29/18  1032 02/16/15  0832 02/16/15  0832 09/11/14  1155   CHOL 179 253*   < > 210* 296*   HDL 43 48   < > 42 53   LDL 83 164*   < > 132* 219*   TRIG 263* 204*   < > 182* 122   CHOLHDLRATIO  --   --   --  5.0 5.6*    < > = values in this interval not displayed.         He lost his job due to COVID in June so looking for another job.  Wife working from home and they have been staying home during this time.    O: No VS since tele visit    Assessment and Plan:     (I10) Essential hypertension, benign  (primary encounter diagnosis)  Comment: well controlled  Plan: amLODIPine (NORVASC) 5 MG tablet        refilled    (E78.5) Hyperlipidemia with target LDL less than 130  Comment:   Plan: rosuvastatin (CRESTOR) 10 MG tablet            (F33.0) Mild recurrent major depression (H)  Comment:   Plan: managed by psychiatry    (E66.01) Morbid obesity (H)  Comment:   Plan: Current  weight is 247lbs    (J45.40) Moderate persistent asthma, uncomplicated  Comment:   Plan: followed by allergist    (Z12.5) Screening for prostate ca  Comment: pt has hx of elevated PSA and overdue for recheck of PSA; see notes from 2016. Had MRI in 2017 that showed BPH  Plan: come in for PSA    Total time on call 24 minutes      Sandra Guardado PA-C

## 2021-01-14 ENCOUNTER — HEALTH MAINTENANCE LETTER (OUTPATIENT)
Age: 56
End: 2021-01-14

## 2021-01-29 DIAGNOSIS — N52.9 ERECTILE DYSFUNCTION, UNSPECIFIED ERECTILE DYSFUNCTION TYPE: ICD-10-CM

## 2021-01-29 DIAGNOSIS — I10 ESSENTIAL HYPERTENSION, BENIGN: ICD-10-CM

## 2021-01-29 DIAGNOSIS — E78.5 HYPERLIPIDEMIA WITH TARGET LDL LESS THAN 130: ICD-10-CM

## 2021-01-29 DIAGNOSIS — Z12.5 SCREENING FOR PROSTATE CANCER: ICD-10-CM

## 2021-01-29 LAB
ALBUMIN SERPL-MCNC: 3.5 G/DL (ref 3.4–5)
ALP SERPL-CCNC: 73 U/L (ref 40–150)
ALT SERPL W P-5'-P-CCNC: 48 U/L (ref 0–70)
ANION GAP SERPL CALCULATED.3IONS-SCNC: 5 MMOL/L (ref 3–14)
AST SERPL W P-5'-P-CCNC: 22 U/L (ref 0–45)
BILIRUB SERPL-MCNC: 0.3 MG/DL (ref 0.2–1.3)
BUN SERPL-MCNC: 10 MG/DL (ref 7–30)
CALCIUM SERPL-MCNC: 9.5 MG/DL (ref 8.5–10.1)
CHLORIDE SERPL-SCNC: 110 MMOL/L (ref 94–109)
CHOLEST SERPL-MCNC: 194 MG/DL
CO2 SERPL-SCNC: 27 MMOL/L (ref 20–32)
CREAT SERPL-MCNC: 0.87 MG/DL (ref 0.66–1.25)
GFR SERPL CREATININE-BSD FRML MDRD: >90 ML/MIN/{1.73_M2}
GLUCOSE SERPL-MCNC: 92 MG/DL (ref 70–99)
HDLC SERPL-MCNC: 46 MG/DL
LDLC SERPL CALC-MCNC: 103 MG/DL
NONHDLC SERPL-MCNC: 148 MG/DL
POTASSIUM SERPL-SCNC: 3.7 MMOL/L (ref 3.4–5.3)
PROT SERPL-MCNC: 6.6 G/DL (ref 6.8–8.8)
PSA SERPL-ACNC: 3.95 UG/L (ref 0–4)
SODIUM SERPL-SCNC: 142 MMOL/L (ref 133–144)
TRIGL SERPL-MCNC: 223 MG/DL

## 2021-01-29 PROCEDURE — 84403 ASSAY OF TOTAL TESTOSTERONE: CPT | Mod: 90 | Performed by: PHYSICIAN ASSISTANT

## 2021-01-29 PROCEDURE — 80053 COMPREHEN METABOLIC PANEL: CPT | Performed by: PHYSICIAN ASSISTANT

## 2021-01-29 PROCEDURE — 99000 SPECIMEN HANDLING OFFICE-LAB: CPT | Performed by: PHYSICIAN ASSISTANT

## 2021-01-29 PROCEDURE — 80061 LIPID PANEL: CPT | Performed by: PHYSICIAN ASSISTANT

## 2021-01-29 PROCEDURE — 36415 COLL VENOUS BLD VENIPUNCTURE: CPT | Performed by: PHYSICIAN ASSISTANT

## 2021-01-29 PROCEDURE — G0103 PSA SCREENING: HCPCS | Performed by: PHYSICIAN ASSISTANT

## 2021-01-29 PROCEDURE — 84270 ASSAY OF SEX HORMONE GLOBUL: CPT | Performed by: PHYSICIAN ASSISTANT

## 2021-02-01 LAB
SHBG SERPL-SCNC: 45 NMOL/L (ref 11–80)
TESTOST FREE SERPL-MCNC: 5.86 NG/DL (ref 4.7–24.4)
TESTOST SERPL-MCNC: 357 NG/DL (ref 240–950)

## 2021-02-02 NOTE — RESULT ENCOUNTER NOTE
Douds,    Your testosterone level is normal.  Your PSA is normal.  Your blood sugar, kidney function and liver function tests are normal.    Your cholesterol profile looks good on crestor so please continue that medication.    Let me know if you have any questions.    Sandra Guardado PA-C

## 2021-06-04 DIAGNOSIS — I10 ESSENTIAL HYPERTENSION, BENIGN: ICD-10-CM

## 2021-06-07 RX ORDER — AMLODIPINE BESYLATE 5 MG/1
5 TABLET ORAL DAILY
Qty: 90 TABLET | Refills: 2 | Status: SHIPPED | OUTPATIENT
Start: 2021-06-07 | End: 2022-03-24

## 2021-06-07 NOTE — TELEPHONE ENCOUNTER
BP Readings from Last 3 Encounters:   09/03/19 117/86   09/02/19 122/84   11/27/18 130/87     BP out of date.  Virtual visit 1/12/21.    Please refill as appropriate.    Sherrell Cassidy RN  [facility]

## 2021-10-24 ENCOUNTER — HEALTH MAINTENANCE LETTER (OUTPATIENT)
Age: 56
End: 2021-10-24

## 2022-02-13 ENCOUNTER — HEALTH MAINTENANCE LETTER (OUTPATIENT)
Age: 57
End: 2022-02-13

## 2022-03-20 DIAGNOSIS — I10 ESSENTIAL HYPERTENSION, BENIGN: ICD-10-CM

## 2022-03-24 RX ORDER — AMLODIPINE BESYLATE 5 MG/1
TABLET ORAL
Qty: 90 TABLET | Refills: 2 | Status: SHIPPED | OUTPATIENT
Start: 2022-03-24

## 2022-03-24 NOTE — TELEPHONE ENCOUNTER
Routing refill request to provider for review/approval because:  BP Readings from Last 3 Encounters:   09/03/19 117/86   09/02/19 122/84   11/27/18 130/87     Creatinine   Date Value Ref Range Status   01/29/2021 0.87 0.66 - 1.25 mg/dL Final         Ramirez Vincent, RN  [facility] Triage Nurse

## 2022-04-24 DIAGNOSIS — E78.5 HYPERLIPIDEMIA WITH TARGET LDL LESS THAN 130: ICD-10-CM

## 2022-04-26 RX ORDER — ROSUVASTATIN CALCIUM 10 MG/1
TABLET, COATED ORAL
Qty: 90 TABLET | Refills: 0 | Status: SHIPPED | OUTPATIENT
Start: 2022-04-26

## 2022-10-15 ENCOUNTER — HEALTH MAINTENANCE LETTER (OUTPATIENT)
Age: 57
End: 2022-10-15

## 2022-11-12 ENCOUNTER — HOSPITAL ENCOUNTER (EMERGENCY)
Facility: CLINIC | Age: 57
Discharge: HOME | End: 2022-11-12
Attending: EMERGENCY MEDICINE | Admitting: EMERGENCY MEDICINE
Payer: COMMERCIAL

## 2022-11-12 ENCOUNTER — APPOINTMENT (OUTPATIENT)
Dept: GENERAL RADIOLOGY | Facility: CLINIC | Age: 57
End: 2022-11-12
Attending: EMERGENCY MEDICINE
Payer: COMMERCIAL

## 2022-11-12 ENCOUNTER — APPOINTMENT (OUTPATIENT)
Dept: ULTRASOUND IMAGING | Facility: CLINIC | Age: 57
End: 2022-11-12
Attending: EMERGENCY MEDICINE
Payer: COMMERCIAL

## 2022-11-12 VITALS
SYSTOLIC BLOOD PRESSURE: 141 MMHG | RESPIRATION RATE: 10 BRPM | TEMPERATURE: 97 F | HEART RATE: 74 BPM | OXYGEN SATURATION: 98 % | DIASTOLIC BLOOD PRESSURE: 98 MMHG

## 2022-11-12 DIAGNOSIS — K29.00 ACUTE GASTRITIS, PRESENCE OF BLEEDING UNSPECIFIED, UNSPECIFIED GASTRITIS TYPE: ICD-10-CM

## 2022-11-12 DIAGNOSIS — R07.9 CHEST PAIN, UNSPECIFIED TYPE: ICD-10-CM

## 2022-11-12 DIAGNOSIS — R10.13 EPIGASTRIC PAIN: ICD-10-CM

## 2022-11-12 LAB
ALBUMIN SERPL-MCNC: 3.7 G/DL (ref 3.4–5)
ALP SERPL-CCNC: 79 U/L (ref 40–150)
ALT SERPL W P-5'-P-CCNC: 45 U/L (ref 0–70)
ANION GAP SERPL CALCULATED.3IONS-SCNC: 3 MMOL/L (ref 3–14)
AST SERPL W P-5'-P-CCNC: 23 U/L (ref 0–45)
BASOPHILS # BLD AUTO: 0.1 10E3/UL (ref 0–0.2)
BASOPHILS NFR BLD AUTO: 1 %
BILIRUB SERPL-MCNC: 0.4 MG/DL (ref 0.2–1.3)
BUN SERPL-MCNC: 13 MG/DL (ref 7–30)
CALCIUM SERPL-MCNC: 9.5 MG/DL (ref 8.5–10.1)
CHLORIDE BLD-SCNC: 107 MMOL/L (ref 94–109)
CO2 SERPL-SCNC: 30 MMOL/L (ref 20–32)
CREAT SERPL-MCNC: 0.84 MG/DL (ref 0.66–1.25)
EOSINOPHIL # BLD AUTO: 0.2 10E3/UL (ref 0–0.7)
EOSINOPHIL NFR BLD AUTO: 3 %
ERYTHROCYTE [DISTWIDTH] IN BLOOD BY AUTOMATED COUNT: 13 % (ref 10–15)
GFR SERPL CREATININE-BSD FRML MDRD: >90 ML/MIN/1.73M2
GLUCOSE BLD-MCNC: 101 MG/DL (ref 70–99)
HCT VFR BLD AUTO: 47.7 % (ref 40–53)
HGB BLD-MCNC: 15.9 G/DL (ref 13.3–17.7)
HOLD SPECIMEN: NORMAL
IMM GRANULOCYTES # BLD: 0 10E3/UL
IMM GRANULOCYTES NFR BLD: 0 %
LIPASE SERPL-CCNC: 120 U/L (ref 73–393)
LYMPHOCYTES # BLD AUTO: 1.7 10E3/UL (ref 0.8–5.3)
LYMPHOCYTES NFR BLD AUTO: 26 %
MCH RBC QN AUTO: 29.4 PG (ref 26.5–33)
MCHC RBC AUTO-ENTMCNC: 33.3 G/DL (ref 31.5–36.5)
MCV RBC AUTO: 88 FL (ref 78–100)
MONOCYTES # BLD AUTO: 0.6 10E3/UL (ref 0–1.3)
MONOCYTES NFR BLD AUTO: 9 %
NEUTROPHILS # BLD AUTO: 4.1 10E3/UL (ref 1.6–8.3)
NEUTROPHILS NFR BLD AUTO: 61 %
NRBC # BLD AUTO: 0 10E3/UL
NRBC BLD AUTO-RTO: 0 /100
PLATELET # BLD AUTO: 264 10E3/UL (ref 150–450)
POTASSIUM BLD-SCNC: 3.7 MMOL/L (ref 3.4–5.3)
PROT SERPL-MCNC: 7.1 G/DL (ref 6.8–8.8)
RBC # BLD AUTO: 5.4 10E6/UL (ref 4.4–5.9)
SODIUM SERPL-SCNC: 140 MMOL/L (ref 133–144)
TROPONIN I SERPL HS-MCNC: 7 NG/L
WBC # BLD AUTO: 6.6 10E3/UL (ref 4–11)

## 2022-11-12 PROCEDURE — 84484 ASSAY OF TROPONIN QUANT: CPT | Performed by: EMERGENCY MEDICINE

## 2022-11-12 PROCEDURE — 99285 EMERGENCY DEPT VISIT HI MDM: CPT | Mod: 25

## 2022-11-12 PROCEDURE — 85025 COMPLETE CBC W/AUTO DIFF WBC: CPT | Performed by: EMERGENCY MEDICINE

## 2022-11-12 PROCEDURE — 71046 X-RAY EXAM CHEST 2 VIEWS: CPT

## 2022-11-12 PROCEDURE — 36415 COLL VENOUS BLD VENIPUNCTURE: CPT | Performed by: EMERGENCY MEDICINE

## 2022-11-12 PROCEDURE — 250N000009 HC RX 250: Performed by: EMERGENCY MEDICINE

## 2022-11-12 PROCEDURE — 250N000013 HC RX MED GY IP 250 OP 250 PS 637: Performed by: EMERGENCY MEDICINE

## 2022-11-12 PROCEDURE — 76705 ECHO EXAM OF ABDOMEN: CPT

## 2022-11-12 PROCEDURE — 80053 COMPREHEN METABOLIC PANEL: CPT | Performed by: EMERGENCY MEDICINE

## 2022-11-12 PROCEDURE — 83690 ASSAY OF LIPASE: CPT | Performed by: EMERGENCY MEDICINE

## 2022-11-12 RX ADMIN — LIDOCAINE HYDROCHLORIDE 30 ML: 20 SOLUTION ORAL; TOPICAL at 18:23

## 2022-11-12 ASSESSMENT — ENCOUNTER SYMPTOMS
DIARRHEA: 0
COUGH: 0
PALPITATIONS: 0
NAUSEA: 0
SHORTNESS OF BREATH: 0
VOMITING: 0
FEVER: 0
ABDOMINAL PAIN: 1

## 2022-11-12 ASSESSMENT — ACTIVITIES OF DAILY LIVING (ADL)
ADLS_ACUITY_SCORE: 37
ADLS_ACUITY_SCORE: 37

## 2022-11-12 NOTE — ED PROVIDER NOTES
History   Chief Complaint:  Chest Pain    HPI   Romelia Crystal is a 57 year old male with history of high blood pressure and high cholesterol who presents with concern of epigastric pain.  Reports that this pain woke him up early this morning just before 6 AM.  He felt hungry.  He sat down and had a bowl of Cheerios and felt better.  He went back to sleep.  He has continued to have some epigastric discomfort since then.  He originally said chest pain, but he points to his epigastric region.  The pain does not radiate.  He is not short of breath.  No recent cough.  He has asthma but has not needed a rescue inhaler recently.  No history of DVT or pulmonary embolism.  No recent surgery. No hemoptysis.  Not on hormones.  No calf pain or swelling.  No history of diabetes.  Non-smoker.  No prior cardiac disease.  The patient is suffering from heartburn in the past, this felt similar but different as well.  No hematuria.  No fevers.  No cough or URI symptoms.  Did not take anything for pain specifically did not take Tums, Mylanta, etc. no blood in stool.  No dark black stools.    Review of Systems   Constitutional: Negative for fever.   Respiratory: Negative for cough and shortness of breath.    Cardiovascular: Positive for chest pain. Negative for palpitations.   Gastrointestinal: Positive for abdominal pain. Negative for diarrhea, nausea and vomiting.   All other systems reviewed and are negative.    Allergies:  Hmg-Coa-R Inhibitors  Lisinopril     Medications:  Albuterol   Norvasc   Wellbutrin   Crestor   Revatio   Effexor   Xanax     Past Medical History:     Hyperlipidemia   Depression   Vertigo   Anxiety   Hypertension   Asthma    ADALGISA  Diverticulitis   Obesity      Past Surgical History:    Tonsillectomy   [place] teeth       Family History:    Cancer   Lipids     Social History:  The patient presents to the ED with wife, Azul  PCP: Sandra Guardado     Physical Exam     Patient Vitals for the past 24 hrs:   BP Temp  Temp src Pulse Resp SpO2   11/12/22 1832 (!) 141/98 -- -- 74 -- 98 %   11/12/22 1820 (!) 140/92 -- -- 75 -- --   11/12/22 1725 (!) 135/91 -- -- 73 10 97 %   11/12/22 1639 (!) 151/105 97  F (36.1  C) Temporal 79 18 98 %       Physical Exam  Physical Exam   General:  Sitting on bed with wife, Azul, at bedside.   HENT:  No obvious trauma to head  Right Ear:  External ear normal.   Left Ear:  External ear normal.   Nose:  Nose normal.   Eyes:  Conjunctivae and EOM are normal. Pupils are equal, round, and reactive.   Neck: Normal range of motion. Neck supple. No tracheal deviation present.   CV:  Normal heart sounds. No murmur heard.  Pulm/Chest: Effort normal and breath sounds normal.   Abd: Soft. No distension. There is slight epigastric tenderness, but no tenderness elsewhere.  Negative Levi's and negative McBurney's.  No CVA tenderness. There is no rigidity, no rebound and no guarding.   M/S: Normal range of motion.   Neuro: Alert. GCS 15.  Skin: Skin is warm and dry. No rash noted. Not diaphoretic.   Psych: Normal mood and affect. Behavior is normal.     Emergency Department Course     ECG  ECG taken at 1641, ECG read at 1700  Normal sinus rhythm   Normal ECG    No significant change as compared to prior, dated 9/2/19.  Rate 82 bpm. TN interval 190 ms. QRS duration 94 ms. QT/QTc 388/453 ms. P-R-T axes 30 12 33.     Imaging:  Abdomen US, limited (RUQ only)   Final Result   IMPRESSION:   1.  Normal limited abdominal ultrasound.            XR Chest 2 Views   Final Result   IMPRESSION: Negative chest.        Report per radiology    Laboratory:  Labs Ordered and Resulted from Time of ED Arrival to Time of ED Departure   COMPREHENSIVE METABOLIC PANEL - Abnormal       Result Value    Sodium 140      Potassium 3.7      Chloride 107      Carbon Dioxide (CO2) 30      Anion Gap 3      Urea Nitrogen 13      Creatinine 0.84      Calcium 9.5      Glucose 101 (*)     Alkaline Phosphatase 79      AST 23      ALT 45      Protein  Total 7.1      Albumin 3.7      Bilirubin Total 0.4      GFR Estimate >90     TROPONIN I - Normal    Troponin I High Sensitivity 7     LIPASE - Normal    Lipase 120     CBC WITH PLATELETS AND DIFFERENTIAL    WBC Count 6.6      RBC Count 5.40      Hemoglobin 15.9      Hematocrit 47.7      MCV 88      MCH 29.4      MCHC 33.3      RDW 13.0      Platelet Count 264      % Neutrophils 61      % Lymphocytes 26      % Monocytes 9      % Eosinophils 3      % Basophils 1      % Immature Granulocytes 0      NRBCs per 100 WBC 0      Absolute Neutrophils 4.1      Absolute Lymphocytes 1.7      Absolute Monocytes 0.6      Absolute Eosinophils 0.2      Absolute Basophils 0.1      Absolute Immature Granulocytes 0.0      Absolute NRBCs 0.0       Emergency Department Course:  Reviewed:  I reviewed nursing notes, vitals, past medical history and Care Everywhere.    Assessments:  1715 I obtained history and examined the patient as noted above.   1850 Patient received GI cocktail with improved symptoms.   1936 I rechecked patient and discussed discharge.  Patient continues to be pain-free    Interventions:  Medications   lidocaine (viscous) (XYLOCAINE) 2 % 15 mL, alum & mag hydroxide-simethicone (MAALOX) 15 mL GI Cocktail (30 mLs Oral Given 11/12/22 1823)       Disposition:  The patient was discharged to home.     Impression & Plan   Medical Decision Making:  Romelia Crystal is a very pleasant 57 year old year old patient who presents to the emergency department with concern of what he initially said his chest pain, but points to the epigastric region.  Screening EKG and troponin are negative.  This does not sound cardiac. The chest xray was reviewed and shows no evidence of pneumothorax, infiltrate to suggest pneumonia, widened mediastinum to suggest aortic dissection, obvious rib fracture or free air under the diaphragm to suggest perforated viscous ulcer.  He is not short of breath or hypoxic to suggest pulmonary embolism nor has any  risk factors other than age.  No indication for d-dimer at this time.  Liver enzymes and lipase are negative.  Doubt pancreatitis.  Ultrasound shows no evidence of cholelithiasis so doubt biliary colic.  He received a GI cocktail and had complete relief of his symptoms.  Suspect this is underlying gastritis.  Recommended a brat diet.  Recommended close follow-up with the PCP.  If symptoms persist outpatient EGD could be considered.  He will return with any blood in the stool, fever, return of pain, etc. No lower abdominal pain to suggest appendicitis or diverticulitis.  No tenderness to suggest bowel obstruction.    The treatment plan was discussed with the patient and they expressed understanding of this plan and consented to the plan.  In addition, the patient will return to the emergency department if their symptoms persist, worsen, if new symptoms arise or if there is any concern as other pathology may be present that is not evident at this time. They also understand the importance of close follow up in the clinic and if unable to do so will return to the emergency department for a reevaluation. All questions were answered.    Diagnosis:    ICD-10-CM    1. Chest pain, unspecified type  R07.9       2. Epigastric pain  R10.13       3. Acute gastritis, presence of bleeding unspecified, unspecified gastritis type  K29.00           Discharge Medications:  New Prescriptions    No medications on file       Scribe Disclosure:  He SIDHU, am serving as a scribe at 5:14 PM on 11/12/2022 to document services personally performed by Mahamed Murphy DO based on my observations and the provider's statements to me.      Mahamed Murphy DO  11/12/22 1950

## 2022-11-12 NOTE — ED TRIAGE NOTES
Patient comes in with left sided chest pain that radiates into his left arm.  He denies any nausea.  States he has dizziness with this pain as well.  Pain woke him from a sleep at 6am this morning.

## 2023-03-26 ENCOUNTER — HEALTH MAINTENANCE LETTER (OUTPATIENT)
Age: 58
End: 2023-03-26

## 2024-05-26 ENCOUNTER — HEALTH MAINTENANCE LETTER (OUTPATIENT)
Age: 59
End: 2024-05-26

## 2025-06-14 ENCOUNTER — HEALTH MAINTENANCE LETTER (OUTPATIENT)
Age: 60
End: 2025-06-14

## 2025-08-17 ENCOUNTER — HOSPITAL ENCOUNTER (EMERGENCY)
Facility: CLINIC | Age: 60
Discharge: HOME | End: 2025-08-17
Attending: EMERGENCY MEDICINE | Admitting: EMERGENCY MEDICINE
Payer: COMMERCIAL

## 2025-08-17 ENCOUNTER — APPOINTMENT (OUTPATIENT)
Dept: CT IMAGING | Facility: CLINIC | Age: 60
End: 2025-08-17
Attending: EMERGENCY MEDICINE
Payer: COMMERCIAL

## 2025-08-17 VITALS
HEART RATE: 51 BPM | SYSTOLIC BLOOD PRESSURE: 137 MMHG | TEMPERATURE: 98.1 F | HEIGHT: 70 IN | WEIGHT: 202 LBS | OXYGEN SATURATION: 99 % | BODY MASS INDEX: 28.92 KG/M2 | RESPIRATION RATE: 16 BRPM | DIASTOLIC BLOOD PRESSURE: 94 MMHG

## 2025-08-17 DIAGNOSIS — R51.9 NONINTRACTABLE EPISODIC HEADACHE, UNSPECIFIED HEADACHE TYPE: ICD-10-CM

## 2025-08-17 DIAGNOSIS — R93.0 ABNORMAL ANGIOGRAM OF HEAD: ICD-10-CM

## 2025-08-17 DIAGNOSIS — R42 LIGHTHEADEDNESS: Primary | ICD-10-CM

## 2025-08-17 LAB
ANION GAP SERPL CALCULATED.3IONS-SCNC: 9 MMOL/L (ref 7–15)
ATRIAL RATE - MUSE: 51 BPM
BASOPHILS # BLD AUTO: 0.05 10E3/UL (ref 0–0.2)
BASOPHILS NFR BLD AUTO: 1.1 %
BUN SERPL-MCNC: 11.1 MG/DL (ref 8–23)
CALCIUM SERPL-MCNC: 9.2 MG/DL (ref 8.8–10.4)
CHLORIDE SERPL-SCNC: 102 MMOL/L (ref 98–107)
CREAT SERPL-MCNC: 0.96 MG/DL (ref 0.67–1.17)
CRP SERPL-MCNC: <3 MG/L
DIASTOLIC BLOOD PRESSURE - MUSE: NORMAL MMHG
EGFRCR SERPLBLD CKD-EPI 2021: 89 ML/MIN/1.73M2
EOSINOPHIL # BLD AUTO: 0.17 10E3/UL (ref 0–0.7)
EOSINOPHIL NFR BLD AUTO: 3.7 %
ERYTHROCYTE [DISTWIDTH] IN BLOOD BY AUTOMATED COUNT: 13.3 % (ref 10–15)
ERYTHROCYTE [SEDIMENTATION RATE] IN BLOOD BY WESTERGREN METHOD: 6 MM/HR (ref 0–20)
GLUCOSE SERPL-MCNC: 124 MG/DL (ref 70–99)
HCO3 SERPL-SCNC: 27 MMOL/L (ref 22–29)
HCT VFR BLD AUTO: 37.9 % (ref 40–53)
HGB BLD-MCNC: 13.2 G/DL (ref 13.3–17.7)
IMM GRANULOCYTES # BLD: <0.03 10E3/UL
IMM GRANULOCYTES NFR BLD: 0.2 %
INTERPRETATION ECG - MUSE: NORMAL
LYMPHOCYTES # BLD AUTO: 1.18 10E3/UL (ref 0.8–5.3)
LYMPHOCYTES NFR BLD AUTO: 26 %
MCH RBC QN AUTO: 30.6 PG (ref 26.5–33)
MCHC RBC AUTO-ENTMCNC: 34.8 G/DL (ref 31.5–36.5)
MCV RBC AUTO: 87.7 FL (ref 78–100)
MONOCYTES # BLD AUTO: 0.4 10E3/UL (ref 0–1.3)
MONOCYTES NFR BLD AUTO: 8.8 %
NEUTROPHILS # BLD AUTO: 2.73 10E3/UL (ref 1.6–8.3)
NEUTROPHILS NFR BLD AUTO: 60.2 %
NRBC # BLD AUTO: <0.03 10E3/UL
NRBC BLD AUTO-RTO: 0 /100
P AXIS - MUSE: 43 DEGREES
PLATELET # BLD AUTO: 180 10E3/UL (ref 150–450)
POTASSIUM SERPL-SCNC: 4 MMOL/L (ref 3.4–5.3)
PR INTERVAL - MUSE: 232 MS
QRS DURATION - MUSE: 100 MS
QT - MUSE: 484 MS
QTC - MUSE: 446 MS
R AXIS - MUSE: 15 DEGREES
RBC # BLD AUTO: 4.32 10E6/UL (ref 4.4–5.9)
SODIUM SERPL-SCNC: 138 MMOL/L (ref 135–145)
SYSTOLIC BLOOD PRESSURE - MUSE: NORMAL MMHG
T AXIS - MUSE: 26 DEGREES
VENTRICULAR RATE- MUSE: 51 BPM
WBC # BLD AUTO: 4.54 10E3/UL (ref 4–11)

## 2025-08-17 PROCEDURE — 80048 BASIC METABOLIC PNL TOTAL CA: CPT | Performed by: EMERGENCY MEDICINE

## 2025-08-17 PROCEDURE — 250N000009 HC RX 250: Performed by: EMERGENCY MEDICINE

## 2025-08-17 PROCEDURE — 258N000003 HC RX IP 258 OP 636: Performed by: EMERGENCY MEDICINE

## 2025-08-17 PROCEDURE — 99285 EMERGENCY DEPT VISIT HI MDM: CPT | Mod: 25 | Performed by: EMERGENCY MEDICINE

## 2025-08-17 PROCEDURE — 93005 ELECTROCARDIOGRAM TRACING: CPT

## 2025-08-17 PROCEDURE — 250N000011 HC RX IP 250 OP 636: Performed by: EMERGENCY MEDICINE

## 2025-08-17 PROCEDURE — 70496 CT ANGIOGRAPHY HEAD: CPT | Mod: XU

## 2025-08-17 PROCEDURE — 86140 C-REACTIVE PROTEIN: CPT | Performed by: EMERGENCY MEDICINE

## 2025-08-17 PROCEDURE — 96374 THER/PROPH/DIAG INJ IV PUSH: CPT | Mod: 59

## 2025-08-17 PROCEDURE — 70450 CT HEAD/BRAIN W/O DYE: CPT

## 2025-08-17 PROCEDURE — 99285 EMERGENCY DEPT VISIT HI MDM: CPT | Mod: 25

## 2025-08-17 PROCEDURE — 85004 AUTOMATED DIFF WBC COUNT: CPT | Performed by: EMERGENCY MEDICINE

## 2025-08-17 PROCEDURE — 36415 COLL VENOUS BLD VENIPUNCTURE: CPT | Performed by: EMERGENCY MEDICINE

## 2025-08-17 PROCEDURE — 85652 RBC SED RATE AUTOMATED: CPT | Performed by: EMERGENCY MEDICINE

## 2025-08-17 RX ORDER — DEXAMETHASONE SODIUM PHOSPHATE 10 MG/ML
10 INJECTION, SOLUTION INTRAMUSCULAR; INTRAVENOUS ONCE
Status: COMPLETED | OUTPATIENT
Start: 2025-08-17 | End: 2025-08-17

## 2025-08-17 RX ORDER — IOPAMIDOL 755 MG/ML
67 INJECTION, SOLUTION INTRAVASCULAR ONCE
Status: COMPLETED | OUTPATIENT
Start: 2025-08-17 | End: 2025-08-17

## 2025-08-17 RX ORDER — METOCLOPRAMIDE HYDROCHLORIDE 5 MG/ML
10 INJECTION INTRAMUSCULAR; INTRAVENOUS ONCE
Status: COMPLETED | OUTPATIENT
Start: 2025-08-17 | End: 2025-08-17

## 2025-08-17 RX ORDER — ACETAMINOPHEN 500 MG
1000 TABLET ORAL ONCE
Status: COMPLETED | OUTPATIENT
Start: 2025-08-17 | End: 2025-08-17

## 2025-08-17 RX ADMIN — SODIUM CHLORIDE 1000 ML: 9 INJECTION, SOLUTION INTRAVENOUS at 10:59

## 2025-08-17 RX ADMIN — IOPAMIDOL 67 ML: 755 INJECTION, SOLUTION INTRAVENOUS at 11:12

## 2025-08-17 RX ADMIN — SODIUM CHLORIDE 90 ML: 9 INJECTION, SOLUTION INTRAVENOUS at 11:13

## 2025-08-17 ASSESSMENT — COLUMBIA-SUICIDE SEVERITY RATING SCALE - C-SSRS
1. IN THE PAST MONTH, HAVE YOU WISHED YOU WERE DEAD OR WISHED YOU COULD GO TO SLEEP AND NOT WAKE UP?: NO
6. HAVE YOU EVER DONE ANYTHING, STARTED TO DO ANYTHING, OR PREPARED TO DO ANYTHING TO END YOUR LIFE?: NO
2. HAVE YOU ACTUALLY HAD ANY THOUGHTS OF KILLING YOURSELF IN THE PAST MONTH?: NO

## 2025-08-17 ASSESSMENT — ACTIVITIES OF DAILY LIVING (ADL)
ADLS_ACUITY_SCORE: 41